# Patient Record
Sex: FEMALE | Race: WHITE | Employment: FULL TIME | URBAN - METROPOLITAN AREA
[De-identification: names, ages, dates, MRNs, and addresses within clinical notes are randomized per-mention and may not be internally consistent; named-entity substitution may affect disease eponyms.]

---

## 2017-01-05 ENCOUNTER — NURSE ONLY (OUTPATIENT)
Dept: FAMILY MEDICINE CLINIC | Age: 52
End: 2017-01-05

## 2017-01-05 DIAGNOSIS — J30.2 SEASONAL ALLERGIC RHINITIS, UNSPECIFIED ALLERGIC RHINITIS TRIGGER: Primary | ICD-10-CM

## 2017-01-05 PROCEDURE — 96372 THER/PROPH/DIAG INJ SC/IM: CPT | Performed by: FAMILY MEDICINE

## 2017-01-05 RX ORDER — TRIAMCINOLONE ACETONIDE 40 MG/ML
80 INJECTION, SUSPENSION INTRA-ARTICULAR; INTRAMUSCULAR ONCE
Status: COMPLETED | OUTPATIENT
Start: 2017-01-05 | End: 2017-01-05

## 2017-01-05 RX ADMIN — TRIAMCINOLONE ACETONIDE 80 MG: 40 INJECTION, SUSPENSION INTRA-ARTICULAR; INTRAMUSCULAR at 11:41

## 2017-01-10 DIAGNOSIS — I10 ESSENTIAL HYPERTENSION: ICD-10-CM

## 2017-01-10 DIAGNOSIS — M26.629 TMJ ARTHRALGIA: ICD-10-CM

## 2017-01-10 RX ORDER — CARVEDILOL 12.5 MG/1
TABLET ORAL
Qty: 60 TABLET | Refills: 1 | Status: SHIPPED | OUTPATIENT
Start: 2017-01-10 | End: 2017-03-25

## 2017-01-10 RX ORDER — IBUPROFEN 800 MG/1
TABLET ORAL
Qty: 60 TABLET | Refills: 1 | Status: SHIPPED | OUTPATIENT
Start: 2017-01-10 | End: 2017-04-25 | Stop reason: SDUPTHER

## 2017-01-10 RX ORDER — CARVEDILOL 6.25 MG/1
TABLET ORAL
Qty: 60 TABLET | Refills: 1 | Status: SHIPPED | OUTPATIENT
Start: 2017-01-10 | End: 2017-04-25 | Stop reason: SDUPTHER

## 2017-01-11 ENCOUNTER — HOSPITAL ENCOUNTER (OUTPATIENT)
Dept: GENERAL RADIOLOGY | Age: 52
Discharge: HOME | End: 2017-01-11
Payer: COMMERCIAL

## 2017-01-11 DIAGNOSIS — I10 ESSENTIAL HYPERTENSION: ICD-10-CM

## 2017-01-11 DIAGNOSIS — M47.817 LUMBOSACRAL SPONDYLOSIS WITHOUT MYELOPATHY: ICD-10-CM

## 2017-01-11 DIAGNOSIS — M26.629 TMJ ARTHRALGIA: ICD-10-CM

## 2017-01-11 PROCEDURE — 72110 X-RAY EXAM L-2 SPINE 4/>VWS: CPT

## 2017-01-11 RX ORDER — CARVEDILOL 12.5 MG/1
12.5 TABLET ORAL 2 TIMES DAILY
Qty: 60 TABLET | Refills: 5 | Status: SHIPPED | OUTPATIENT
Start: 2017-01-11 | End: 2017-04-25 | Stop reason: SDUPTHER

## 2017-01-11 RX ORDER — IBUPROFEN 800 MG/1
800 TABLET ORAL 2 TIMES DAILY WITH MEALS
Qty: 60 TABLET | Refills: 5 | Status: SHIPPED | OUTPATIENT
Start: 2017-01-11 | End: 2017-03-25

## 2017-01-11 RX ORDER — CARVEDILOL 6.25 MG/1
6.25 TABLET ORAL 2 TIMES DAILY WITH MEALS
Qty: 60 TABLET | Refills: 5 | Status: SHIPPED | OUTPATIENT
Start: 2017-01-11 | End: 2017-03-25

## 2017-01-11 RX ORDER — CYCLOBENZAPRINE HCL 10 MG
10 TABLET ORAL 3 TIMES DAILY PRN
Qty: 60 TABLET | Refills: 3 | Status: SHIPPED | OUTPATIENT
Start: 2017-01-11 | End: 2018-08-06 | Stop reason: SDUPTHER

## 2017-01-17 ENCOUNTER — HOSPITAL ENCOUNTER (OUTPATIENT)
Dept: PHYSICAL THERAPY | Age: 52
Setting detail: THERAPIES SERIES
Discharge: HOME | End: 2017-01-17
Payer: COMMERCIAL

## 2017-01-17 PROCEDURE — 97161 PT EVAL LOW COMPLEX 20 MIN: CPT

## 2017-01-24 ENCOUNTER — HOSPITAL ENCOUNTER (OUTPATIENT)
Dept: PHYSICAL THERAPY | Age: 52
Setting detail: THERAPIES SERIES
Discharge: HOME | End: 2017-01-24
Payer: COMMERCIAL

## 2017-01-24 PROCEDURE — 97140 MANUAL THERAPY 1/> REGIONS: CPT

## 2017-01-24 PROCEDURE — G0283 ELEC STIM OTHER THAN WOUND: HCPCS

## 2017-01-24 PROCEDURE — 97110 THERAPEUTIC EXERCISES: CPT

## 2017-01-25 ENCOUNTER — HOSPITAL ENCOUNTER (OUTPATIENT)
Dept: PHYSICAL THERAPY | Age: 52
Setting detail: THERAPIES SERIES
Discharge: HOME | End: 2017-01-25
Payer: COMMERCIAL

## 2017-01-25 PROCEDURE — 97140 MANUAL THERAPY 1/> REGIONS: CPT

## 2017-01-25 PROCEDURE — 97110 THERAPEUTIC EXERCISES: CPT

## 2017-01-25 PROCEDURE — G0283 ELEC STIM OTHER THAN WOUND: HCPCS

## 2017-01-31 ENCOUNTER — HOSPITAL ENCOUNTER (OUTPATIENT)
Dept: PHYSICAL THERAPY | Age: 52
Discharge: HOME | End: 2017-01-31

## 2017-02-09 ENCOUNTER — CLINICAL DOCUMENTATION (OUTPATIENT)
Dept: PHYSICAL THERAPY | Age: 52
End: 2017-02-09

## 2017-04-05 ENCOUNTER — HOSPITAL ENCOUNTER (OUTPATIENT)
Dept: MRI IMAGING | Age: 52
Discharge: HOME | End: 2017-04-05
Payer: COMMERCIAL

## 2017-04-05 DIAGNOSIS — M51.36 DEGENERATION OF LUMBAR INTERVERTEBRAL DISC: ICD-10-CM

## 2017-04-15 ENCOUNTER — HOSPITAL ENCOUNTER (OUTPATIENT)
Dept: MRI IMAGING | Age: 52
Discharge: HOME | End: 2017-04-15
Payer: COMMERCIAL

## 2017-04-15 PROCEDURE — 72148 MRI LUMBAR SPINE W/O DYE: CPT

## 2017-04-18 ENCOUNTER — OFFICE VISIT (OUTPATIENT)
Dept: BEHAVIORAL/MENTAL HEALTH | Age: 52
End: 2017-04-18

## 2017-04-18 DIAGNOSIS — F43.23 ADJUSTMENT DISORDER WITH MIXED ANXIETY AND DEPRESSED MOOD: Primary | ICD-10-CM

## 2017-04-18 PROCEDURE — 90791 PSYCH DIAGNOSTIC EVALUATION: CPT | Performed by: PSYCHOLOGIST

## 2017-04-18 ASSESSMENT — PATIENT HEALTH QUESTIONNAIRE - PHQ9
3. TROUBLE FALLING OR STAYING ASLEEP: 0
4. FEELING TIRED OR HAVING LITTLE ENERGY: 0
7. TROUBLE CONCENTRATING ON THINGS, SUCH AS READING THE NEWSPAPER OR WATCHING TELEVISION: 0
9. THOUGHTS THAT YOU WOULD BE BETTER OFF DEAD, OR OF HURTING YOURSELF: 0
8. MOVING OR SPEAKING SO SLOWLY THAT OTHER PEOPLE COULD HAVE NOTICED. OR THE OPPOSITE, BEING SO FIGETY OR RESTLESS THAT YOU HAVE BEEN MOVING AROUND A LOT MORE THAN USUAL: 0
10. IF YOU CHECKED OFF ANY PROBLEMS, HOW DIFFICULT HAVE THESE PROBLEMS MADE IT FOR YOU TO DO YOUR WORK, TAKE CARE OF THINGS AT HOME, OR GET ALONG WITH OTHER PEOPLE: 1
2. FEELING DOWN, DEPRESSED OR HOPELESS: 1
5. POOR APPETITE OR OVEREATING: 0
SUM OF ALL RESPONSES TO PHQ9 QUESTIONS 1 & 2: 2
1. LITTLE INTEREST OR PLEASURE IN DOING THINGS: 1
SUM OF ALL RESPONSES TO PHQ QUESTIONS 1-9: 2
6. FEELING BAD ABOUT YOURSELF - OR THAT YOU ARE A FAILURE OR HAVE LET YOURSELF OR YOUR FAMILY DOWN: 0

## 2017-04-25 ENCOUNTER — OFFICE VISIT (OUTPATIENT)
Dept: PRIMARY CARE CLINIC | Age: 52
End: 2017-04-25

## 2017-04-25 VITALS
WEIGHT: 270 LBS | DIASTOLIC BLOOD PRESSURE: 88 MMHG | SYSTOLIC BLOOD PRESSURE: 136 MMHG | HEART RATE: 88 BPM | HEIGHT: 67 IN | RESPIRATION RATE: 20 BRPM | BODY MASS INDEX: 42.38 KG/M2 | TEMPERATURE: 97.1 F

## 2017-04-25 DIAGNOSIS — M26.629 ARTHRALGIA OF TEMPOROMANDIBULAR JOINT, UNSPECIFIED LATERALITY: ICD-10-CM

## 2017-04-25 DIAGNOSIS — Z12.31 ENCOUNTER FOR SCREENING MAMMOGRAM FOR BREAST CANCER: ICD-10-CM

## 2017-04-25 DIAGNOSIS — I10 ESSENTIAL HYPERTENSION: Primary | ICD-10-CM

## 2017-04-25 PROCEDURE — 99214 OFFICE O/P EST MOD 30 MIN: CPT | Performed by: FAMILY MEDICINE

## 2017-04-25 RX ORDER — CARVEDILOL 6.25 MG/1
TABLET ORAL
Qty: 60 TABLET | Refills: 11 | Status: SHIPPED | OUTPATIENT
Start: 2017-04-25 | End: 2018-01-15

## 2017-04-25 RX ORDER — CARVEDILOL 12.5 MG/1
12.5 TABLET ORAL 2 TIMES DAILY
Qty: 60 TABLET | Refills: 11 | Status: SHIPPED | OUTPATIENT
Start: 2017-04-25 | End: 2018-05-04 | Stop reason: SDUPTHER

## 2017-04-25 RX ORDER — IBUPROFEN 800 MG/1
TABLET ORAL
Qty: 60 TABLET | Refills: 11 | Status: SHIPPED | OUTPATIENT
Start: 2017-04-25 | End: 2018-05-04 | Stop reason: SDUPTHER

## 2017-04-25 ASSESSMENT — ENCOUNTER SYMPTOMS
SHORTNESS OF BREATH: 0
ABDOMINAL PAIN: 0
BLURRED VISION: 0
ORTHOPNEA: 0
EYES NEGATIVE: 1
RESPIRATORY NEGATIVE: 1

## 2017-05-09 ENCOUNTER — HOSPITAL ENCOUNTER (OUTPATIENT)
Dept: WOMENS IMAGING | Age: 52
Discharge: HOME | End: 2017-05-09
Payer: COMMERCIAL

## 2017-05-09 DIAGNOSIS — Z12.31 ENCOUNTER FOR SCREENING MAMMOGRAM FOR BREAST CANCER: ICD-10-CM

## 2017-05-09 PROCEDURE — G0202 SCR MAMMO BI INCL CAD: HCPCS

## 2017-05-20 ENCOUNTER — TELEPHONE (OUTPATIENT)
Dept: PRIMARY CARE CLINIC | Age: 52
End: 2017-05-20

## 2017-06-06 ENCOUNTER — PATIENT MESSAGE (OUTPATIENT)
Dept: PRIMARY CARE CLINIC | Age: 52
End: 2017-06-06

## 2017-06-06 PROBLEM — M47.26 OSTEOARTHRITIS OF SPINE WITH RADICULOPATHY, LUMBAR REGION: Status: ACTIVE | Noted: 2017-06-06

## 2017-06-06 PROBLEM — M43.10 DEGENERATIVE SPONDYLOLISTHESIS: Status: ACTIVE | Noted: 2017-06-06

## 2017-06-17 DIAGNOSIS — R53.83 FATIGUE, UNSPECIFIED TYPE: ICD-10-CM

## 2017-06-17 LAB — VITAMIN D 25-HYDROXY: 13.1 NG/ML (ref 30–100)

## 2017-06-21 ENCOUNTER — PATIENT MESSAGE (OUTPATIENT)
Dept: PRIMARY CARE CLINIC | Age: 52
End: 2017-06-21

## 2017-06-21 RX ORDER — ERGOCALCIFEROL 1.25 MG/1
50000 CAPSULE ORAL WEEKLY
Qty: 12 CAPSULE | Refills: 0 | Status: SHIPPED | OUTPATIENT
Start: 2017-06-21 | End: 2017-06-27 | Stop reason: SDUPTHER

## 2017-06-23 ENCOUNTER — TELEPHONE (OUTPATIENT)
Dept: PRIMARY CARE CLINIC | Age: 52
End: 2017-06-23

## 2017-06-27 ENCOUNTER — OFFICE VISIT (OUTPATIENT)
Dept: PRIMARY CARE CLINIC | Age: 52
End: 2017-06-27

## 2017-06-27 VITALS
BODY MASS INDEX: 42.22 KG/M2 | SYSTOLIC BLOOD PRESSURE: 138 MMHG | DIASTOLIC BLOOD PRESSURE: 86 MMHG | HEIGHT: 67 IN | RESPIRATION RATE: 18 BRPM | OXYGEN SATURATION: 95 % | TEMPERATURE: 97.6 F | WEIGHT: 269 LBS | HEART RATE: 88 BPM

## 2017-06-27 DIAGNOSIS — M47.26 OSTEOARTHRITIS OF SPINE WITH RADICULOPATHY, LUMBAR REGION: ICD-10-CM

## 2017-06-27 DIAGNOSIS — E55.9 VITAMIN D DEFICIENCY: ICD-10-CM

## 2017-06-27 DIAGNOSIS — Z00.00 ANNUAL PHYSICAL EXAM: Primary | ICD-10-CM

## 2017-06-27 DIAGNOSIS — Z12.11 SCREENING FOR COLON CANCER: ICD-10-CM

## 2017-06-27 PROCEDURE — 99396 PREV VISIT EST AGE 40-64: CPT | Performed by: FAMILY MEDICINE

## 2017-06-27 RX ORDER — ERGOCALCIFEROL 1.25 MG/1
50000 CAPSULE ORAL
Qty: 24 CAPSULE | Refills: 0 | Status: SHIPPED | OUTPATIENT
Start: 2017-06-29 | End: 2017-10-24

## 2017-06-27 RX ORDER — GABAPENTIN 600 MG/1
600 TABLET ORAL EVERY EVENING
Qty: 90 TABLET | Refills: 0 | Status: SHIPPED | OUTPATIENT
Start: 2017-06-27 | End: 2018-05-04 | Stop reason: DRUGHIGH

## 2017-06-27 ASSESSMENT — ENCOUNTER SYMPTOMS
DIARRHEA: 0
BACK PAIN: 0
GASTROINTESTINAL NEGATIVE: 1
PHOTOPHOBIA: 0
CONSTIPATION: 0
SHORTNESS OF BREATH: 0
EYE ITCHING: 0
WHEEZING: 0
RESPIRATORY NEGATIVE: 1
ABDOMINAL PAIN: 0
EYE REDNESS: 0
EYES NEGATIVE: 1

## 2017-06-28 DIAGNOSIS — Z00.00 ANNUAL PHYSICAL EXAM: ICD-10-CM

## 2017-06-28 LAB
ALBUMIN SERPL-MCNC: 3.9 G/DL (ref 3.9–4.9)
ALP BLD-CCNC: 87 U/L (ref 40–130)
ALT SERPL-CCNC: 22 U/L (ref 0–33)
ANION GAP SERPL CALCULATED.3IONS-SCNC: 12 MEQ/L (ref 7–13)
AST SERPL-CCNC: 17 U/L (ref 0–35)
BASOPHILS ABSOLUTE: 0.1 K/UL (ref 0–0.2)
BASOPHILS RELATIVE PERCENT: 0.7 %
BILIRUB SERPL-MCNC: 0.4 MG/DL (ref 0–1.2)
BUN BLDV-MCNC: 15 MG/DL (ref 6–20)
CALCIUM SERPL-MCNC: 9.2 MG/DL (ref 8.6–10.2)
CHLORIDE BLD-SCNC: 105 MEQ/L (ref 98–107)
CHOLESTEROL, TOTAL: 191 MG/DL (ref 0–199)
CO2: 25 MEQ/L (ref 22–29)
CREAT SERPL-MCNC: 0.54 MG/DL (ref 0.5–0.9)
EOSINOPHILS ABSOLUTE: 0.3 K/UL (ref 0–0.7)
EOSINOPHILS RELATIVE PERCENT: 3.4 %
GFR AFRICAN AMERICAN: >60
GFR NON-AFRICAN AMERICAN: >60
GLOBULIN: 2.6 G/DL (ref 2.3–3.5)
GLUCOSE BLD-MCNC: 86 MG/DL (ref 74–109)
HCT VFR BLD CALC: 43.5 % (ref 37–47)
HDLC SERPL-MCNC: 36 MG/DL (ref 40–59)
HEMOGLOBIN: 14.3 G/DL (ref 12–16)
LDL CHOLESTEROL CALCULATED: 128 MG/DL (ref 0–129)
LYMPHOCYTES ABSOLUTE: 1.6 K/UL (ref 1–4.8)
LYMPHOCYTES RELATIVE PERCENT: 20.4 %
MCH RBC QN AUTO: 29 PG (ref 27–31.3)
MCHC RBC AUTO-ENTMCNC: 32.8 % (ref 33–37)
MCV RBC AUTO: 88.3 FL (ref 82–100)
MONOCYTES ABSOLUTE: 0.7 K/UL (ref 0.2–0.8)
MONOCYTES RELATIVE PERCENT: 8.2 %
NEUTROPHILS ABSOLUTE: 5.4 K/UL (ref 1.4–6.5)
NEUTROPHILS RELATIVE PERCENT: 67.3 %
PDW BLD-RTO: 13.5 % (ref 11.5–14.5)
PLATELET # BLD: 198 K/UL (ref 130–400)
POTASSIUM SERPL-SCNC: 4.2 MEQ/L (ref 3.5–5.1)
RBC # BLD: 4.93 M/UL (ref 4.2–5.4)
SODIUM BLD-SCNC: 142 MEQ/L (ref 132–144)
TOTAL PROTEIN: 6.5 G/DL (ref 6.4–8.1)
TRIGL SERPL-MCNC: 134 MG/DL (ref 0–200)
TSH SERPL DL<=0.05 MIU/L-ACNC: 3.01 UIU/ML (ref 0.27–4.2)
WBC # BLD: 8 K/UL (ref 4.8–10.8)

## 2017-06-29 ENCOUNTER — TELEPHONE (OUTPATIENT)
Dept: PRIMARY CARE CLINIC | Age: 52
End: 2017-06-29

## 2017-06-30 DIAGNOSIS — Z12.11 SCREENING FOR COLON CANCER: Primary | ICD-10-CM

## 2017-07-11 ENCOUNTER — HOSPITAL ENCOUNTER (OUTPATIENT)
Dept: WOMENS IMAGING | Age: 52
Discharge: HOME | End: 2017-07-11
Payer: COMMERCIAL

## 2017-07-11 DIAGNOSIS — Z78.0 ASYMPTOMATIC POSTMENOPAUSAL STATUS (AGE-RELATED) (NATURAL): ICD-10-CM

## 2017-07-11 PROCEDURE — 77080 DXA BONE DENSITY AXIAL: CPT

## 2017-07-21 ENCOUNTER — NURSE ONLY (OUTPATIENT)
Dept: FAMILY MEDICINE CLINIC | Age: 52
End: 2017-07-21

## 2017-07-21 DIAGNOSIS — J30.2 SEASONAL ALLERGIC RHINITIS, UNSPECIFIED ALLERGIC RHINITIS TRIGGER: Primary | ICD-10-CM

## 2017-07-21 PROCEDURE — 96372 THER/PROPH/DIAG INJ SC/IM: CPT | Performed by: FAMILY MEDICINE

## 2017-07-21 RX ORDER — TRIAMCINOLONE ACETONIDE 40 MG/ML
80 INJECTION, SUSPENSION INTRA-ARTICULAR; INTRAMUSCULAR ONCE
Status: COMPLETED | OUTPATIENT
Start: 2017-07-21 | End: 2017-07-21

## 2017-07-21 RX ADMIN — TRIAMCINOLONE ACETONIDE 80 MG: 40 INJECTION, SUSPENSION INTRA-ARTICULAR; INTRAMUSCULAR at 12:00

## 2017-07-26 ENCOUNTER — TELEPHONE (OUTPATIENT)
Dept: PRIMARY CARE CLINIC | Age: 52
End: 2017-07-26

## 2017-09-06 RX ORDER — ERGOCALCIFEROL 1.25 MG/1
CAPSULE ORAL
Qty: 24 CAPSULE | Refills: 1 | Status: SHIPPED | OUTPATIENT
Start: 2017-09-06 | End: 2018-01-15 | Stop reason: SDUPTHER

## 2017-09-08 ENCOUNTER — PATIENT MESSAGE (OUTPATIENT)
Dept: PRIMARY CARE CLINIC | Age: 52
End: 2017-09-08

## 2017-09-27 ENCOUNTER — TELEPHONE (OUTPATIENT)
Dept: FAMILY MEDICINE CLINIC | Age: 52
End: 2017-09-27

## 2017-09-27 RX ORDER — AZITHROMYCIN 250 MG/1
TABLET, FILM COATED ORAL
Qty: 6 TABLET | Refills: 0 | Status: SHIPPED | OUTPATIENT
Start: 2017-09-27 | End: 2017-10-24

## 2017-10-04 ENCOUNTER — TELEPHONE (OUTPATIENT)
Dept: FAMILY MEDICINE CLINIC | Age: 52
End: 2017-10-04

## 2017-10-04 RX ORDER — CEFUROXIME AXETIL 250 MG/1
250 TABLET ORAL 2 TIMES DAILY
Qty: 20 TABLET | Refills: 0 | Status: SHIPPED | OUTPATIENT
Start: 2017-10-04 | End: 2017-10-14

## 2017-10-15 ENCOUNTER — HOSPITAL ENCOUNTER (EMERGENCY)
Age: 52
Discharge: HOME | End: 2017-10-15
Payer: COMMERCIAL

## 2017-10-15 ENCOUNTER — APPOINTMENT (OUTPATIENT)
Dept: GENERAL RADIOLOGY | Age: 52
End: 2017-10-15
Payer: COMMERCIAL

## 2017-10-15 VITALS
WEIGHT: 260 LBS | BODY MASS INDEX: 40.81 KG/M2 | DIASTOLIC BLOOD PRESSURE: 101 MMHG | RESPIRATION RATE: 18 BRPM | HEART RATE: 76 BPM | OXYGEN SATURATION: 99 % | HEIGHT: 67 IN | SYSTOLIC BLOOD PRESSURE: 172 MMHG | TEMPERATURE: 98 F
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
## 2017-10-15 DIAGNOSIS — M25.561 ACUTE PAIN OF RIGHT KNEE: ICD-10-CM

## 2017-10-15 DIAGNOSIS — M79.601 RIGHT ARM PAIN: ICD-10-CM

## 2017-10-15 DIAGNOSIS — M25.562 ACUTE PAIN OF LEFT KNEE: ICD-10-CM

## 2017-10-15 PROCEDURE — 73562 X-RAY EXAM OF KNEE 3: CPT

## 2017-10-15 PROCEDURE — 99283 EMERGENCY DEPT VISIT LOW MDM: CPT

## 2017-10-15 PROCEDURE — 73060 X-RAY EXAM OF HUMERUS: CPT

## 2017-10-15 RX ORDER — TRAMADOL HYDROCHLORIDE 50 MG/1
50 TABLET ORAL EVERY 6 HOURS PRN
Qty: 10 TABLET | Refills: 0 | Status: SHIPPED | OUTPATIENT
Start: 2017-10-15 | End: 2017-10-25

## 2017-10-15 ASSESSMENT — PAIN DESCRIPTION - ORIENTATION: ORIENTATION: RIGHT

## 2017-10-15 ASSESSMENT — PAIN DESCRIPTION - FREQUENCY: FREQUENCY: CONTINUOUS

## 2017-10-15 ASSESSMENT — ENCOUNTER SYMPTOMS
SHORTNESS OF BREATH: 0
VOMITING: 0
NAUSEA: 0
DIARRHEA: 0
COUGH: 0
ABDOMINAL PAIN: 0

## 2017-10-15 ASSESSMENT — PAIN DESCRIPTION - DESCRIPTORS: DESCRIPTORS: ACHING;SHARP;SHOOTING

## 2017-10-15 ASSESSMENT — PAIN DESCRIPTION - LOCATION: LOCATION: ARM;KNEE

## 2017-10-15 ASSESSMENT — PAIN DESCRIPTION - PAIN TYPE: TYPE: ACUTE PAIN;CHRONIC PAIN

## 2017-10-15 ASSESSMENT — PAIN SCALES - GENERAL: PAINLEVEL_OUTOF10: 8

## 2017-10-15 NOTE — ED PROVIDER NOTES
[address] [facility] ED  eMERGENCY dEPARTMENT eNCOUnter      Pt Name: Tres Steele  MRN: [id number]  [place] 1965  Date of evaluation: 10/15/2017  Provider: Moshe Bob PA-C      HISTORY OF PRESENT ILLNESS    Tres Steele is a 47 y.o. female who presents to the Emergency Department with 2 days ago. Patient tripped going into a school on the metal portion of a doorway. Patient landed on bilateral knees. Patient's right knee has more pain in the left knee. She is able to ambulate but it causes pain. She does have a history of chronic knee problems that she sees Dr. Vero Allen 4. Patient also complains of right arm pain. With bruising. She is not on any blood thinners. She denies any numbness or tingling. Patient has been taking Tylenol and Motrin. Walking makes the pain worse. REVIEW OF SYSTEMS       Review of Systems   Constitutional: Negative for chills, diaphoresis, fatigue and fever. HENT: Negative for congestion. Respiratory: Negative for cough and shortness of breath. Cardiovascular: Negative for chest pain and palpitations. Gastrointestinal: Negative for abdominal pain, diarrhea, nausea and vomiting. Genitourinary: Negative for dysuria and flank pain. Musculoskeletal: Positive for arthralgias. Skin: Negative for rash. Neurological: Negative for dizziness, light-headedness and headaches.          PAST MEDICAL HISTORY     Past Medical History:   Diagnosis Date    Allergic rhinitis 3/8/2016    Chronic bilateral low back pain without sciatica 12/19/2016    Diverticulosis 8/27/2013    Drug addiction, Pts. daughter 12/19/2016    Hypertension     Localized edema 8/30/2016    Osteoarthritis     Osteoarthritis of spine with radiculopathy, lumbar region, Sertich 6/27/2017    Postprandial diarrhea 8/27/2013    Renal stones     S/P cholecystectomy, 2008 8/27/2013    Screen for colon cancer 8/30/2016    Seasonal allergies 8/11/2015    Stress 8/27/2013 No    Sexual activity: Not Asked     Other Topics Concern    None     Social History Narrative    None       SCREENINGS             PHYSICAL EXAM    (up to 7 for level 4, 8 or more for level 5)     ED Triage Vitals [10/15/17 1133]   BP Temp Temp Source Pulse Resp SpO2 Height Weight   (!) 172/101 98 °F (36.7 °C) Oral 76 18 99 % 5' 7\" (1.702 m) 260 lb (117.9 kg)       Physical Exam   Constitutional: She is oriented to person, place, and time. She appears well-developed and well-nourished. She is active. No distress. HENT:   Head: Normocephalic and atraumatic. Mouth/Throat: Mucous membranes are normal.   Neck: Normal range of motion. Neck supple. Cardiovascular: Normal rate, regular rhythm, normal heart sounds, intact distal pulses and normal pulses. Pulmonary/Chest: Effort normal and breath sounds normal.   Abdominal: Soft. Normal appearance and bowel sounds are normal. There is no tenderness. Musculoskeletal:        Right knee: She exhibits decreased range of motion. She exhibits no swelling. Tenderness found. Medial joint line, lateral joint line, MCL, LCL and patellar tendon tenderness noted. Right upper arm: She exhibits tenderness. Arms:  Bruising and tenderness to palpation over right humerus. Full range of motion but it elicits pain. Right knee tenderness to palpation. Range of motion limited due to pain. Left knee minimal tenderness to palpation. Full range of motion. Sensation intact to light touch. 2+ distal pulse. Neurological: She is alert and oriented to person, place, and time. She has normal strength. Skin: Skin is warm, dry and intact. No rash noted. She is not diaphoretic. Nursing note and vitals reviewed. All other labs were within normal range or not returned as of this dictation.     EMERGENCY DEPARTMENT COURSE and DIFFERENTIAL DIAGNOSIS/MDM:   Vitals:    Vitals:    10/15/17 1133   BP: (!) 172/101   Pulse: 76   Resp: 18   Temp: 98 °F (36.7 °C)

## 2017-10-19 ENCOUNTER — CARE COORDINATION (OUTPATIENT)
Dept: CARE COORDINATION | Age: 52
End: 2017-10-19

## 2017-10-21 ENCOUNTER — HOSPITAL ENCOUNTER (EMERGENCY)
Age: 52
Discharge: HOME | End: 2017-10-21
Attending: EMERGENCY MEDICINE
Payer: COMMERCIAL

## 2017-10-21 ENCOUNTER — APPOINTMENT (OUTPATIENT)
Dept: CT IMAGING | Age: 52
End: 2017-10-21
Payer: COMMERCIAL

## 2017-10-21 VITALS
BODY MASS INDEX: 40.81 KG/M2 | DIASTOLIC BLOOD PRESSURE: 92 MMHG | SYSTOLIC BLOOD PRESSURE: 172 MMHG | OXYGEN SATURATION: 96 % | TEMPERATURE: 98.4 F | HEART RATE: 82 BPM | RESPIRATION RATE: 18 BRPM | HEIGHT: 67 IN | WEIGHT: 260 LBS

## 2017-10-21 DIAGNOSIS — G50.0 TRIGEMINAL NEURALGIA: Primary | ICD-10-CM

## 2017-10-21 LAB
ALBUMIN SERPL-MCNC: 3.6 G/DL (ref 3.9–4.9)
ALP BLD-CCNC: 95 U/L (ref 40–130)
ALT SERPL-CCNC: 14 U/L (ref 0–33)
ANION GAP SERPL CALCULATED.3IONS-SCNC: 10 MEQ/L (ref 7–13)
AST SERPL-CCNC: 10 U/L (ref 0–35)
BASOPHILS ABSOLUTE: 0.1 K/UL (ref 0–0.2)
BASOPHILS RELATIVE PERCENT: 0.7 %
BILIRUB SERPL-MCNC: 0.2 MG/DL (ref 0–1.2)
BUN BLDV-MCNC: 28 MG/DL (ref 6–20)
CALCIUM SERPL-MCNC: 9 MG/DL (ref 8.6–10.2)
CHLORIDE BLD-SCNC: 104 MEQ/L (ref 98–107)
CO2: 27 MEQ/L (ref 22–29)
CREAT SERPL-MCNC: 0.56 MG/DL (ref 0.5–0.9)
EOSINOPHILS ABSOLUTE: 0.1 K/UL (ref 0–0.7)
EOSINOPHILS RELATIVE PERCENT: 1.1 %
GFR AFRICAN AMERICAN: >60
GFR NON-AFRICAN AMERICAN: >60
GLOBULIN: 2.5 G/DL (ref 2.3–3.5)
GLUCOSE BLD-MCNC: 119 MG/DL (ref 74–109)
HCT VFR BLD CALC: 40.7 % (ref 37–47)
HEMOGLOBIN: 13.4 G/DL (ref 12–16)
LYMPHOCYTES ABSOLUTE: 1.9 K/UL (ref 1–4.8)
LYMPHOCYTES RELATIVE PERCENT: 17.1 %
MCH RBC QN AUTO: 29.1 PG (ref 27–31.3)
MCHC RBC AUTO-ENTMCNC: 32.9 % (ref 33–37)
MCV RBC AUTO: 88.3 FL (ref 82–100)
MONOCYTES ABSOLUTE: 0.8 K/UL (ref 0.2–0.8)
MONOCYTES RELATIVE PERCENT: 7.5 %
NEUTROPHILS ABSOLUTE: 8.3 K/UL (ref 1.4–6.5)
NEUTROPHILS RELATIVE PERCENT: 73.6 %
PDW BLD-RTO: 13.5 % (ref 11.5–14.5)
PLATELET # BLD: 205 K/UL (ref 130–400)
POTASSIUM SERPL-SCNC: 4.2 MEQ/L (ref 3.5–5.1)
RBC # BLD: 4.61 M/UL (ref 4.2–5.4)
SEDIMENTATION RATE, ERYTHROCYTE: 16 MM (ref 0–30)
SODIUM BLD-SCNC: 141 MEQ/L (ref 132–144)
TOTAL PROTEIN: 6.1 G/DL (ref 6.4–8.1)
WBC # BLD: 11.3 K/UL (ref 4.8–10.8)

## 2017-10-21 PROCEDURE — 96375 TX/PRO/DX INJ NEW DRUG ADDON: CPT

## 2017-10-21 PROCEDURE — 6370000000 HC RX 637 (ALT 250 FOR IP): Performed by: EMERGENCY MEDICINE

## 2017-10-21 PROCEDURE — 99284 EMERGENCY DEPT VISIT MOD MDM: CPT

## 2017-10-21 PROCEDURE — 80053 COMPREHEN METABOLIC PANEL: CPT

## 2017-10-21 PROCEDURE — 6360000002 HC RX W HCPCS: Performed by: EMERGENCY MEDICINE

## 2017-10-21 PROCEDURE — 70450 CT HEAD/BRAIN W/O DYE: CPT

## 2017-10-21 PROCEDURE — 85025 COMPLETE CBC W/AUTO DIFF WBC: CPT

## 2017-10-21 PROCEDURE — 96374 THER/PROPH/DIAG INJ IV PUSH: CPT

## 2017-10-21 PROCEDURE — 85652 RBC SED RATE AUTOMATED: CPT

## 2017-10-21 PROCEDURE — 36415 COLL VENOUS BLD VENIPUNCTURE: CPT

## 2017-10-21 RX ORDER — DIAZEPAM 5 MG/ML
5 INJECTION, SOLUTION INTRAMUSCULAR; INTRAVENOUS ONCE
Status: DISCONTINUED | OUTPATIENT
Start: 2017-10-21 | End: 2017-10-21

## 2017-10-21 RX ORDER — CARBAMAZEPINE 200 MG/1
200 TABLET ORAL 2 TIMES DAILY
Status: DISCONTINUED | OUTPATIENT
Start: 2017-10-21 | End: 2017-10-22 | Stop reason: HOSPADM

## 2017-10-21 RX ORDER — CARBAMAZEPINE 200 MG/1
200 CAPSULE, EXTENDED RELEASE ORAL 2 TIMES DAILY
Qty: 30 CAPSULE | Refills: 0 | Status: SHIPPED | OUTPATIENT
Start: 2017-10-21 | End: 2018-02-15 | Stop reason: ALTCHOICE

## 2017-10-21 RX ORDER — KETOROLAC TROMETHAMINE 30 MG/ML
30 INJECTION, SOLUTION INTRAMUSCULAR; INTRAVENOUS ONCE
Status: COMPLETED | OUTPATIENT
Start: 2017-10-21 | End: 2017-10-21

## 2017-10-21 RX ORDER — LORAZEPAM 2 MG/ML
1 INJECTION INTRAMUSCULAR ONCE
Status: COMPLETED | OUTPATIENT
Start: 2017-10-21 | End: 2017-10-21

## 2017-10-21 RX ADMIN — CARBAMAZEPINE 200 MG: 200 TABLET ORAL at 23:40

## 2017-10-21 RX ADMIN — KETOROLAC TROMETHAMINE 30 MG: 30 INJECTION, SOLUTION INTRAMUSCULAR at 21:49

## 2017-10-21 RX ADMIN — LORAZEPAM 1 MG: 2 INJECTION INTRAMUSCULAR; INTRAVENOUS at 21:49

## 2017-10-21 ASSESSMENT — ENCOUNTER SYMPTOMS
SHORTNESS OF BREATH: 0
ABDOMINAL PAIN: 0
FACIAL SWELLING: 0
CHEST TIGHTNESS: 0
SORE THROAT: 0
PHOTOPHOBIA: 0
VOMITING: 0
EYE DISCHARGE: 0
ABDOMINAL DISTENTION: 0
WHEEZING: 0
COUGH: 0

## 2017-10-21 ASSESSMENT — PAIN DESCRIPTION - PAIN TYPE: TYPE: ACUTE PAIN

## 2017-10-21 ASSESSMENT — PAIN DESCRIPTION - LOCATION: LOCATION: HEAD

## 2017-10-21 ASSESSMENT — PAIN DESCRIPTION - DESCRIPTORS: DESCRIPTORS: ACHING;PRESSURE

## 2017-10-21 ASSESSMENT — PAIN SCALES - GENERAL: PAINLEVEL_OUTOF10: 10

## 2017-10-22 NOTE — ED PROVIDER NOTES
[address] [facility] ED  eMERGENCY dEPARTMENT eNCOUnter      Pt Name: Cheyenne Harris  MRN: [id number]  [place] 1965  Date of evaluation: 10/21/2017  Provider: Bentley Galan MD    [address] [place] [address]       Chief Complaint   Patient presents with    Numbness     facial onset 1800    Headache         HISTORY OF PRESENT ILLNESS   (Location/Symptom, Timing/Onset, Context/Setting, Quality, Duration, Modifying Factors, Severity)  Note limiting factors. Cheyenne Harris is a 47 y.o. female who presents to the emergency department Complaining of right-sided facial pain. Patient states she is getting these lancing pains to the right side of her face at this last a few seconds. Since going on for the past 2-3 hours. She describes maybe a slight numbness with it. No weakness. No weakness of the face mouth or tongue. No weakness of the arms or legs. She states causing a headache relation means more facial pain. She's not had this before on the right side. She had questionable TMJ and the left side in the past.  No recent upper respiratory symptoms. HPI    Nursing Notes were reviewed. REVIEW OF SYSTEMS    (2-9 systems for level 4, 10 or more for level 5)     Review of Systems   Constitutional: Negative for chills and diaphoresis. HENT: Negative for congestion, ear discharge, ear pain, facial swelling, mouth sores and sore throat. Eyes: Negative for photophobia and discharge. Respiratory: Negative for cough, chest tightness, shortness of breath and wheezing. Cardiovascular: Negative for chest pain and palpitations. Gastrointestinal: Negative for abdominal distention, abdominal pain and vomiting. Endocrine: Negative for cold intolerance. Genitourinary: Negative for difficulty urinating. Musculoskeletal: Negative for arthralgias. Skin: Negative for pallor and rash. Allergic/Immunologic: Negative for immunocompromised state. Neurological: Negative for dizziness and syncope. Hematological: Negative for adenopathy. Psychiatric/Behavioral: Negative for agitation and hallucinations. Except as noted above the remainder of the review of systems was reviewed and negative. PAST MEDICAL HISTORY     Past Medical History:   Diagnosis Date    Allergic rhinitis 3/8/2016    Chronic bilateral low back pain without sciatica 12/19/2016    Diverticulosis 8/27/2013    Drug addiction, Pts. daughter 12/19/2016    Hypertension     Localized edema 8/30/2016    Osteoarthritis     Osteoarthritis of spine with radiculopathy, lumbar region, Sertich 6/27/2017    Postprandial diarrhea 8/27/2013    Renal stones     S/P cholecystectomy, 2008 8/27/2013    Screen for colon cancer 8/30/2016    Seasonal allergies 8/11/2015    Stress 8/27/2013         SURGICAL HISTORY       Past Surgical History:   Procedure Laterality Date   [facility]    CHOLECYSTECTOMY      2009    FOOT SURGERY      Left foot 1999    HERNIA REPAIR      2009    HYSTERECTOMY  2006         CURRENT MEDICATIONS       Previous Medications    AZITHROMYCIN (ZITHROMAX Z-PAOLA) 250 MG TABLET    2 STAT THEN 1 DAILY    CARVEDILOL (COREG) 12.5 MG TABLET    Take 1 tablet by mouth 2 times daily    CARVEDILOL (COREG) 6.25 MG TABLET    TAKE 1 TABLET TWICE DAILY (with a meal)    CYCLOBENZAPRINE (FLEXERIL) 10 MG TABLET    Take 1 tablet by mouth 3 times daily as needed for Muscle spasms    GABAPENTIN (NEURONTIN) 100 MG CAPSULE    Take 1 capsule by mouth every morning (before breakfast)    GABAPENTIN (NEURONTIN) 300 MG CAPSULE    Take 1 capsule by mouth 2 times daily    GABAPENTIN (NEURONTIN) 600 MG TABLET    Take 1 tablet by mouth every evening    IBUPROFEN (ADVIL;MOTRIN) 800 MG TABLET    Take 1 tablet by mouth 2 times daily (with meals)    RANITIDINE (ZANTAC) 150 MG TABLET    Take 150 mg by mouth 2 times daily.     TRAMADOL (ULTRAM) 50 MG TABLET    Take 1 tablet by mouth every 6 hours as needed for Pain    VITAMIN D (ERGOCALCIFEROL) 93333 UNITS CAPS CAPSULE    Take 1 capsule by mouth Twice a Week    VITAMIN D (ERGOCALCIFEROL) 49253 UNITS CAPS CAPSULE    1 capsule twice weekly       ALLERGIES     Morphine; Celebrex [celecoxib]; and Medrol [methylprednisolone]    FAMILY HISTORY       Family History   Problem Relation Age of Onset    High Blood Pressure Mother     Other Mother      Hypothyrodism    Cancer Father      prostate    Arthritis Father     Arthritis Brother     Diabetes Maternal Grandmother     Depression Paternal Grandfather     Diabetes Paternal Grandmother           SOCIAL HISTORY       Social History     Social History    Marital status:      Spouse name: N/A    Number of children: N/A    Years of education: N/A     Social History Main Topics    Smoking status: Never Smoker    Smokeless tobacco: Never Used    Alcohol use No    Drug use: No    Sexual activity: Not Asked     Other Topics Concern    None     Social History Narrative    None       SCREENINGS             PHYSICAL EXAM    (up to 7 for level 4, 8 or more for level 5)     ED Triage Vitals [10/21/17 2025]   BP Temp Temp Source Pulse Resp SpO2 Height Weight   (!) 172/92 98.4 °F (36.9 °C) Oral 82 18 96 % 5' 7\" (1.702 m) 260 lb (117.9 kg)       Physical Exam   Constitutional: She is oriented to person, place, and time. She appears well-developed. HENT:   Head: Normocephalic. Not macrocephalic and not microcephalic. Right Ear: Hearing and tympanic membrane normal.   Left Ear: Hearing and tympanic membrane normal.   Nose: Nose normal.   Mouth/Throat: Uvula is midline, oropharynx is clear and moist and mucous membranes are normal.   Eyes: Conjunctivae are normal. Pupils are equal, round, and reactive to light. Neck: Normal range of motion. Neck supple. Cardiovascular: Normal rate, regular rhythm, normal heart sounds and intact distal pulses. Pulmonary/Chest: Effort normal and breath sounds normal.   Abdominal: Soft.  Bowel sounds are normal. There is no tenderness. There is no guarding. Musculoskeletal: Normal range of motion. Neurological: She is alert and oriented to person, place, and time. Skin: Skin is warm and dry. Psychiatric: She has a normal mood and affect. Nursing note and vitals reviewed. DIAGNOSTIC RESULTS     EKG: All EKG's are interpreted by the Emergency Department Physician who either signs or Co-signs this chart in the absence of a cardiologist.        RADIOLOGY:   Non-plain film images such as CT, Ultrasound and MRI are read by the radiologist. Plain radiographic images are visualized and preliminarily interpreted by the emergency physician with the below findings:    CT scan of the head is unremarkable. Interpretation per the Radiologist below, if available at the time of this note:    No orders to display         ED BEDSIDE ULTRASOUND:   Performed by ED Physician - none    LABS:  Labs Reviewed - No data to display    All other labs were within normal range or not returned as of this dictation. EMERGENCY DEPARTMENT COURSE and DIFFERENTIAL DIAGNOSIS/MDM:   Vitals:    Vitals:    10/21/17 2025   BP: (!) 172/92   Pulse: 82   Resp: 18   Temp: 98.4 °F (36.9 °C)   TempSrc: Oral   SpO2: 96%   Weight: 260 lb (117.9 kg)   Height: 5' 7\" (1.702 m)         ED Course      MDM patient is pain free and recheck. She has not had any surges of pain for over an hour. Symptoms are suggestive of trigeminal neuralgia. Unlikely to represent early zoster. No signs of temporal arteritis or stroke. She is given follow-up with the ENT return here for fever or worsening symptoms        CONSULTS:  None    PROCEDURES:  Unless otherwise noted below, none     Procedures    FINAL IMPRESSION    No diagnosis found. DISPOSITION/PLAN   DISPOSITION     PATIENT REFERRED TO:  No follow-up provider specified.     DISCHARGE MEDICATIONS:  New Prescriptions    No medications on file          (Please note that portions of this note were completed with a voice recognition program.  Efforts were made to edit the dictations but occasionally words are mis-transcribed.)    Phil Oleary MD (electronically signed)  Attending Emergency Physician          Phil Oleary MD  10/21/17 0742

## 2017-10-23 ENCOUNTER — CARE COORDINATION (OUTPATIENT)
Dept: CARE COORDINATION | Age: 52
End: 2017-10-23

## 2017-10-24 ENCOUNTER — OFFICE VISIT (OUTPATIENT)
Dept: PRIMARY CARE CLINIC | Age: 52
End: 2017-10-24

## 2017-10-24 VITALS
SYSTOLIC BLOOD PRESSURE: 136 MMHG | DIASTOLIC BLOOD PRESSURE: 88 MMHG | RESPIRATION RATE: 16 BRPM | HEIGHT: 67 IN | WEIGHT: 256 LBS | BODY MASS INDEX: 40.18 KG/M2 | HEART RATE: 80 BPM | TEMPERATURE: 97.7 F

## 2017-10-24 DIAGNOSIS — M25.562 CHRONIC PAIN OF BOTH KNEES: ICD-10-CM

## 2017-10-24 DIAGNOSIS — Z12.11 SCREENING FOR COLON CANCER: ICD-10-CM

## 2017-10-24 DIAGNOSIS — M25.561 CHRONIC PAIN OF BOTH KNEES: ICD-10-CM

## 2017-10-24 DIAGNOSIS — G89.29 CHRONIC PAIN OF BOTH KNEES: ICD-10-CM

## 2017-10-24 DIAGNOSIS — G50.0 TRIGEMINAL NEURALGIA OF RIGHT SIDE OF FACE: Primary | ICD-10-CM

## 2017-10-24 DIAGNOSIS — M47.26 OSTEOARTHRITIS OF SPINE WITH RADICULOPATHY, LUMBAR REGION: ICD-10-CM

## 2017-10-24 DIAGNOSIS — Z12.4 CERVICAL CANCER SCREENING: ICD-10-CM

## 2017-10-24 PROCEDURE — 99213 OFFICE O/P EST LOW 20 MIN: CPT | Performed by: FAMILY MEDICINE

## 2017-10-24 RX ORDER — GABAPENTIN 100 MG/1
200 CAPSULE ORAL 2 TIMES DAILY
Qty: 120 CAPSULE | Refills: 2 | Status: SHIPPED | OUTPATIENT
Start: 2017-10-24 | End: 2018-02-02 | Stop reason: SDUPTHER

## 2017-10-24 ASSESSMENT — ENCOUNTER SYMPTOMS
APNEA: 0
FACIAL SWELLING: 0
EYE PAIN: 0
COLOR CHANGE: 0
WHEEZING: 0
EYE REDNESS: 0
EYE DISCHARGE: 0
CONSTIPATION: 0
CHEST TIGHTNESS: 0
NAUSEA: 0
DIARRHEA: 0
PHOTOPHOBIA: 0
ABDOMINAL PAIN: 0
STRIDOR: 0
BACK PAIN: 1
CHOKING: 0

## 2017-10-24 NOTE — PROGRESS NOTES
Subjective:      Patient ID: Hamzah Jordan is a 47 y.o. female who presents today for:  Chief Complaint   Patient presents with    Follow-Up from Hospital     Has been to [facility] ER twice within the last 2 weeks. She went 10/15/17 because she fell & landed on both knees. She has bilateral knee bruising & bruising to her right upper arm. She has seen Dr. Claudene Rio since & had her right knee drained. She went to the ER on 10/21/17 because she was having right sided jaw pain while driving home from a [employer] event. Was diagnosed with Trigeminal Neuralgia.  Discuss Medications     Was given Carbamazepine 200 MG for the Trigeminal Neuralgia. She wants to talk about this medication & if she should take it since she already takes Neurontin? HPI   Follow-Up from Hospital  Patient is here today for f/u on [facility] ER visits on 10/15 and 10/21. She was seen initially due to a fall and landing on bilateral knees. She has followed up with Dr. Claudene Rio since and had her right knee drained. She was then seen due to right facial numbness. She was diagnosed with Trigeminal Neuralgia. She was prescribed Carbamazepine 200mg at that visit but would like to know if she should continue this due to her already taking Neurontin?      Past Medical History:   Diagnosis Date    Allergic rhinitis 3/8/2016    Chronic bilateral low back pain without sciatica 12/19/2016    Diverticulosis 8/27/2013    Drug addiction, Pts. daughter 12/19/2016    Hypertension     Localized edema 8/30/2016    Osteoarthritis     Osteoarthritis of spine with radiculopathy, lumbar region, Sertich 6/27/2017    Postprandial diarrhea 8/27/2013    Renal stones     S/P cholecystectomy, 2008 8/27/2013    Screen for colon cancer 8/30/2016    Seasonal allergies 8/11/2015    Stress 8/27/2013     Past Surgical History:   Procedure Laterality Date   49 [facility]    CHOLECYSTECTOMY      2009    FOOT SURGERY      Left foot 1999    HERNIA REPAIR headaches. Psychiatric/Behavioral: Negative for confusion and hallucinations. The patient is not nervous/anxious and is not hyperactive. Objective:   /88 (Site: Left Arm, Position: Sitting, Cuff Size: Large Adult)   Pulse 80   Temp 97.7 °F (36.5 °C) (Oral)   Resp 16   Ht 5' 7\" (1.702 m)   Wt 256 lb (116.1 kg)   BMI 40.10 kg/m²     Physical Exam   Constitutional: She is oriented to person, place, and time. She appears well-developed and well-nourished. HENT:   Head: Normocephalic and atraumatic. Nose: Nose normal.   Eyes: Conjunctivae and EOM are normal. Pupils are equal, round, and reactive to light. No scleral icterus. Neck: Normal range of motion. Neck supple. Cardiovascular: Normal rate, regular rhythm and normal heart sounds. Exam reveals no gallop and no friction rub. No murmur heard. Pulmonary/Chest: Effort normal and breath sounds normal. No respiratory distress. She has no wheezes. She has no rales. She exhibits no tenderness. Neurological: She is alert and oriented to person, place, and time. Skin: Skin is warm and dry. No rash noted. No erythema. No pallor. Psychiatric: She has a normal mood and affect. Her behavior is normal. Judgment normal.   Nursing note and vitals reviewed. Assessment:     1. Trigeminal neuralgia of right side of face     2. Chronic pain of both knees, need replacements per Dr. Liudmila Antunez     3. Osteoarthritis of spine with radiculopathy, lumbar region, Sertich     4. Cervical cancer screening  Ambulatory referral to Obstetrics / Gynecology   5.  Screening for colon cancer  POCT Fecal Immunochemical Test (Fit)       Plan:      Orders Placed This Encounter   Procedures    Ambulatory referral to Obstetrics / Gynecology     Referral Priority:   Routine     Referral Type:   Consult for Advice and Opinion     Referral Reason:   Specialty Services Required     Referred to Provider:   Kimberly Ochoa MD     Requested Specialty:   Obstetrics & Gynecology     Number of Visits Requested:   1    POCT Fecal Immunochemical Test (Fit)     Standing Status:   Future     Standing Expiration Date:   10/24/2018     Orders Placed This Encounter   Medications    gabapentin (NEURONTIN) 100 MG capsule     Sig: Take 2 capsules by mouth 2 times daily     Dispense:  120 capsule     Refill:  2       Controlled Substances Monitoring: Attestation: The Prescription Monitoring Report for this patient was reviewed today. Alf Caldwell DO)  Documentation: No signs of potential drug abuse or diversion identified. (Alf Caldwell DO)    No Follow-up on file. I, Chava Jefferson CMA   , am scribing for and in the presence of Kassidy Slade DO. Electronically signed by :  Chava Addison DO, personally performed the services described in this documentation, as scribed by Jacqueline Mckeon CMA     in my presence, and it is both accurate and complete.  Electronically signed by: Kassidy Slade DO    10/24/17 4:32 PM    Kassidy Slade DO

## 2017-11-15 ENCOUNTER — HOSPITAL ENCOUNTER (OUTPATIENT)
Dept: ORTHOPEDIC SURGERY | Age: 52
Discharge: HOME | End: 2017-11-15
Payer: COMMERCIAL

## 2017-11-15 DIAGNOSIS — M17.12 PRIMARY LOCALIZED OSTEOARTHROSIS OF LEFT LOWER LEG: ICD-10-CM

## 2017-11-15 DIAGNOSIS — M17.11 PRIMARY LOCALIZED OSTEOARTHROSIS OF RIGHT LOWER LEG: ICD-10-CM

## 2017-11-15 PROCEDURE — 73560 X-RAY EXAM OF KNEE 1 OR 2: CPT

## 2018-01-15 ENCOUNTER — OFFICE VISIT (OUTPATIENT)
Dept: PRIMARY CARE CLINIC | Age: 53
End: 2018-01-15

## 2018-01-15 VITALS
HEIGHT: 67 IN | DIASTOLIC BLOOD PRESSURE: 82 MMHG | BODY MASS INDEX: 40.18 KG/M2 | HEART RATE: 56 BPM | WEIGHT: 256 LBS | TEMPERATURE: 97.8 F | SYSTOLIC BLOOD PRESSURE: 128 MMHG | RESPIRATION RATE: 14 BRPM

## 2018-01-15 DIAGNOSIS — M25.562 CHRONIC PAIN OF BOTH KNEES: Primary | ICD-10-CM

## 2018-01-15 DIAGNOSIS — K08.409 STATUS POST TOOTH EXTRACTION: ICD-10-CM

## 2018-01-15 DIAGNOSIS — G89.29 CHRONIC PAIN OF BOTH KNEES: Primary | ICD-10-CM

## 2018-01-15 DIAGNOSIS — M25.561 CHRONIC PAIN OF BOTH KNEES: Primary | ICD-10-CM

## 2018-01-15 DIAGNOSIS — I10 ESSENTIAL HYPERTENSION: ICD-10-CM

## 2018-01-15 DIAGNOSIS — E55.9 VITAMIN D DEFICIENCY: ICD-10-CM

## 2018-01-15 PROCEDURE — 99213 OFFICE O/P EST LOW 20 MIN: CPT | Performed by: FAMILY MEDICINE

## 2018-01-15 RX ORDER — LIDOCAINE 50 MG/G
2 PATCH TOPICAL DAILY
Qty: 60 PATCH | Refills: 3 | Status: ON HOLD | OUTPATIENT
Start: 2018-01-15 | End: 2018-05-24 | Stop reason: HOSPADM

## 2018-01-15 RX ORDER — ERGOCALCIFEROL 1.25 MG/1
CAPSULE ORAL
Qty: 24 CAPSULE | Refills: 3 | Status: SHIPPED | OUTPATIENT
Start: 2018-01-15 | End: 2018-09-05

## 2018-01-15 ASSESSMENT — ENCOUNTER SYMPTOMS
NAUSEA: 0
ORTHOPNEA: 0
CONSTIPATION: 0
EYE PAIN: 0
SHORTNESS OF BREATH: 0
COLOR CHANGE: 0
CHEST TIGHTNESS: 0
CHOKING: 0
ABDOMINAL PAIN: 0
EYE DISCHARGE: 0
DIARRHEA: 0
BLURRED VISION: 0
APNEA: 0
FACIAL SWELLING: 0
WHEEZING: 0
EYE REDNESS: 0
STRIDOR: 0
PHOTOPHOBIA: 0

## 2018-01-15 NOTE — PROGRESS NOTES
Subjective:      Patient ID: Savanah Luciano is a 47 y.o. female who presents today for:  Chief Complaint   Patient presents with    Hypertension     Patient is here for a follow up. Patient states things are going well. Pt denies any headaches, chest pain, blurry vision, or sob.  Knee Pain     pt would like to discuss options for knee pain. patient states that she knees bilateral knee replacements but she cannot do it right now and would like to discuss other options.  Other     pt would like the trigeminal neuraligia removed from her record. Patient states that she went to the dentist and had a bad tooth infection, got her tooth pulled and has had no problems since. Hypertension   This is a chronic problem. The current episode started more than 1 year ago. The problem is controlled. Pertinent negatives include no anxiety, blurred vision, chest pain, headaches, malaise/fatigue, neck pain, orthopnea, palpitations, peripheral edema, PND, shortness of breath or sweats. Risk factors for coronary artery disease include obesity. Treatments tried: coreg 12.5 MG bid. The current treatment provides moderate improvement. There are no compliance problems. Knee Pain    The incident occurred more than 1 week ago. There was no injury mechanism. The pain is present in the left knee and right knee. The quality of the pain is described as aching (stiffness). The pain is at a severity of 3/10. The pain is mild. The pain has been worsening since onset. Associated symptoms include an inability to bear weight, a loss of motion and muscle weakness. Pertinent negatives include no loss of sensation, numbness or tingling. She reports no foreign bodies present. The symptoms are aggravated by movement, weight bearing and palpation. Treatments tried: 2 tylenol, 1 motrin BID. The treatment provided mild relief. States that she knows she needs bilateral knee replacements but states that she can't do this right now.  She currently takes 2 tylenol & 1 motrin twice a day. She states that she has stiffness in her knees all the time. She has seen Dr. Laurel Clifton & he has told her she needs replacements. She has already had synvisc injections done & states that they didn't help at all. States that she would like \"trigeminal neuralgia\" removed from her record. States that she went to the dentist recently & they told her she had a bad tooth infection. She got her tooth pulled from the right upper side. States that since she got this tooth pulled that she has had no problems & no pain. Past Medical History:   Diagnosis Date    Allergic rhinitis 3/8/2016    Chronic bilateral low back pain without sciatica 12/19/2016    Diverticulosis 8/27/2013    Drug addiction, Pts. daughter 12/19/2016    Hypertension     Localized edema 8/30/2016    Osteoarthritis     Osteoarthritis of spine with radiculopathy, lumbar region, Sertich 6/27/2017    Postprandial diarrhea 8/27/2013    Renal stones     S/P cholecystectomy, 2008 8/27/2013    Screen for colon cancer 8/30/2016    Seasonal allergies 8/11/2015    Status post tooth extraction, upper right side, caused dental pain 1/15/2018    Stress 8/27/2013     Past Surgical History:   Procedure Laterality Date   49 [facility]    CHOLECYSTECTOMY      2009    FOOT SURGERY      Left foot [address] [facility]      2009    HYSTERECTOMY  2006     Family History   Problem Relation Age of Onset    High Blood Pressure Mother     Other Mother      Hypothyrodism    Cancer Father      prostate    Arthritis Father     Arthritis Brother     Diabetes Maternal Grandmother     Depression Paternal Grandfather     Diabetes Paternal Grandmother      Social History     Social History    Marital status:      Spouse name: N/A    Number of children: N/A    Years of education: N/A     Occupational History    Not on file.      Social History Main Topics    Smoking status: Never Smoker    Smokeless tobacco: Never Used    Alcohol use No    Drug use: No    Sexual activity: Not on file     Other Topics Concern    Not on file     Social History Narrative    No narrative on file     Allergies:  Morphine; Celebrex [celecoxib]; and Medrol [methylprednisolone]    Review of Systems   Constitutional: Negative for activity change, appetite change, chills, diaphoresis, fever and malaise/fatigue. HENT: Negative for congestion, ear discharge, ear pain, facial swelling, hearing loss and mouth sores. Eyes: Negative for blurred vision, photophobia, pain, discharge and redness. Respiratory: Negative for apnea, choking, chest tightness, shortness of breath, wheezing and stridor. Cardiovascular: Negative for chest pain, palpitations, orthopnea, leg swelling and PND. Gastrointestinal: Negative for abdominal pain, constipation, diarrhea and nausea. Endocrine: Negative for cold intolerance, heat intolerance, polydipsia and polyphagia. Genitourinary: Negative for dysuria and frequency. Musculoskeletal: Negative for gait problem, joint swelling, neck pain and neck stiffness. Skin: Negative for color change, pallor, rash and wound. Allergic/Immunologic: Negative for immunocompromised state. Neurological: Negative for tingling, tremors, syncope, facial asymmetry, speech difficulty, numbness and headaches. Psychiatric/Behavioral: Negative for confusion and hallucinations. The patient is not nervous/anxious and is not hyperactive. Objective:   /82 (Site: Right Arm, Position: Sitting, Cuff Size: Large Adult)   Pulse 56   Temp 97.8 °F (36.6 °C) (Tympanic)   Resp 14   Ht 5' 7\" (1.702 m)   Wt 256 lb (116.1 kg)   BMI 40.10 kg/m²     Physical Exam   Constitutional: She is oriented to person, place, and time. She appears well-developed and well-nourished. HENT:   Head: Normocephalic and atraumatic.    Nose: Nose normal.   Eyes: Conjunctivae and EOM are normal. Pupils

## 2018-01-18 ENCOUNTER — OFFICE VISIT (OUTPATIENT)
Dept: OBGYN | Age: 53
End: 2018-01-18

## 2018-01-18 VITALS
BODY MASS INDEX: 40.34 KG/M2 | SYSTOLIC BLOOD PRESSURE: 102 MMHG | DIASTOLIC BLOOD PRESSURE: 80 MMHG | WEIGHT: 257 LBS | HEIGHT: 67 IN

## 2018-01-18 DIAGNOSIS — Z11.51 SCREENING FOR HPV (HUMAN PAPILLOMAVIRUS): ICD-10-CM

## 2018-01-18 DIAGNOSIS — N95.1 MENOPAUSE SYNDROME: ICD-10-CM

## 2018-01-18 DIAGNOSIS — Z01.419 WELL WOMAN EXAM WITH ROUTINE GYNECOLOGICAL EXAM: Primary | ICD-10-CM

## 2018-01-18 LAB
FOLLICLE STIMULATING HORMONE: 52.9 MIU/ML
T4 FREE: 1.35 NG/DL (ref 0.93–1.7)
TSH SERPL DL<=0.05 MIU/L-ACNC: 2.32 UIU/ML (ref 0.27–4.2)

## 2018-01-18 PROCEDURE — 99386 PREV VISIT NEW AGE 40-64: CPT | Performed by: OBSTETRICS & GYNECOLOGY

## 2018-01-18 NOTE — PROGRESS NOTES
colonoscopy and pt declines at this time  FSH/TSH/FT4 pending   Past medical, social and family history reviewed and updated in pt's chart. Routine health screenings and precautions discussed.

## 2018-01-19 ENCOUNTER — TELEPHONE (OUTPATIENT)
Dept: OBGYN | Age: 53
End: 2018-01-19

## 2018-01-19 DIAGNOSIS — Z01.419 WELL WOMAN EXAM WITH ROUTINE GYNECOLOGICAL EXAM: ICD-10-CM

## 2018-01-19 DIAGNOSIS — Z11.51 SCREENING FOR HPV (HUMAN PAPILLOMAVIRUS): ICD-10-CM

## 2018-01-19 NOTE — TELEPHONE ENCOUNTER
----- Message from Kevin Montes MD sent at 1/19/2018  8:35 AM EST -----  May advise pt that hormone are in the menopausal range now

## 2018-01-25 LAB
HPV COMMENT: NORMAL
HPV TYPE 16: NOT DETECTED
HPV TYPE 18: NOT DETECTED
HPVOH (OTHER TYPES): NOT DETECTED

## 2018-01-29 ENCOUNTER — TELEPHONE (OUTPATIENT)
Dept: OBGYN CLINIC | Age: 53
End: 2018-01-29

## 2018-01-29 NOTE — TELEPHONE ENCOUNTER
Patient hormone levels show menopausal and patient interested in knowing if she is done with menopause? Please advise.

## 2018-02-02 RX ORDER — GABAPENTIN 100 MG/1
200 CAPSULE ORAL 2 TIMES DAILY
Qty: 120 CAPSULE | Refills: 2 | Status: ON HOLD | OUTPATIENT
Start: 2018-02-02 | End: 2018-05-21 | Stop reason: HOSPADM

## 2018-02-02 NOTE — TELEPHONE ENCOUNTER
From: Miriam Maurice  Sent: 2/2/2018 12:41 PM EST  Subject: Medication Renewal Request    Jossy Swenson would like a refill of the following medications:  gabapentin (NEURONTIN) 100 MG capsule Leslee Rubin DO]    Preferred pharmacy: [facility] [place] [id number] - [place], [address]    Comment:

## 2018-02-15 ENCOUNTER — OFFICE VISIT (OUTPATIENT)
Dept: FAMILY MEDICINE CLINIC | Age: 53
End: 2018-02-15
Payer: COMMERCIAL

## 2018-02-15 VITALS
SYSTOLIC BLOOD PRESSURE: 140 MMHG | BODY MASS INDEX: 40.47 KG/M2 | HEART RATE: 88 BPM | WEIGHT: 258.4 LBS | DIASTOLIC BLOOD PRESSURE: 96 MMHG | TEMPERATURE: 98 F | RESPIRATION RATE: 12 BRPM

## 2018-02-15 DIAGNOSIS — I10 ESSENTIAL HYPERTENSION: Primary | ICD-10-CM

## 2018-02-15 PROCEDURE — 99213 OFFICE O/P EST LOW 20 MIN: CPT | Performed by: NURSE PRACTITIONER

## 2018-02-15 ASSESSMENT — ENCOUNTER SYMPTOMS
CONSTIPATION: 0
WHEEZING: 0
NAUSEA: 0
ABDOMINAL PAIN: 0
DIARRHEA: 0
CHEST TIGHTNESS: 0
VOMITING: 0
SHORTNESS OF BREATH: 0

## 2018-02-15 NOTE — PROGRESS NOTES
tablet 3    ranitidine (ZANTAC) 150 MG tablet Take 150 mg by mouth 2 times daily as needed        No current facility-administered medications on file prior to visit. Allergies:  Morphine; Celebrex [celecoxib]; and Medrol [methylprednisolone]    Review of Systems   Constitutional: Negative for activity change, appetite change, chills, diaphoresis, fatigue and fever. Respiratory: Negative for chest tightness, shortness of breath and wheezing. Cardiovascular: Positive for palpitations. Negative for chest pain. Gastrointestinal: Negative for abdominal pain, constipation, diarrhea, nausea and vomiting. Allergic/Immunologic: Negative for environmental allergies and food allergies. Neurological: Positive for headaches. Negative for dizziness, seizures, syncope, facial asymmetry, speech difficulty, weakness, light-headedness and numbness. Objective:   BP (!) 140/96   Pulse 88   Temp 98 °F (36.7 °C) (Temporal)   Resp 12   Wt 258 lb 6.4 oz (117.2 kg)   BMI 40.47 kg/m²     Physical Exam   Constitutional: She is oriented to person, place, and time. Vital signs are normal. She appears well-developed and well-nourished. HENT:   Head: Normocephalic. Right Ear: Hearing, tympanic membrane, external ear and ear canal normal.   Left Ear: Hearing, tympanic membrane, external ear and ear canal normal.   Nose: Nose normal.   Mouth/Throat: Uvula is midline, oropharynx is clear and moist and mucous membranes are normal.   Eyes: Conjunctivae and EOM are normal.   Neck: Normal range of motion. Neck supple. Cardiovascular: Normal pulses. Pulmonary/Chest: Effort normal and breath sounds normal.   Abdominal: Normal appearance. Musculoskeletal: Normal range of motion. Lymphadenopathy:        Head (right side): No submental, no submandibular, no tonsillar, no preauricular, no posterior auricular and no occipital adenopathy present.         Head (left side): No submental, no submandibular, no tonsillar,

## 2018-02-16 ENCOUNTER — PATIENT MESSAGE (OUTPATIENT)
Dept: PRIMARY CARE CLINIC | Age: 53
End: 2018-02-16

## 2018-02-19 RX ORDER — CARVEDILOL 6.25 MG/1
6.25 TABLET ORAL 2 TIMES DAILY
Qty: 60 TABLET | Refills: 3 | Status: ON HOLD | OUTPATIENT
Start: 2018-02-19 | End: 2018-05-21 | Stop reason: HOSPADM

## 2018-02-22 ENCOUNTER — HOSPITAL ENCOUNTER (OUTPATIENT)
Dept: GENERAL RADIOLOGY | Age: 53
Discharge: HOME | End: 2018-02-24
Payer: COMMERCIAL

## 2018-02-22 DIAGNOSIS — M54.5 LOW BACK PAIN, UNSPECIFIED BACK PAIN LATERALITY, UNSPECIFIED CHRONICITY, WITH SCIATICA PRESENCE UNSPECIFIED: ICD-10-CM

## 2018-02-22 PROCEDURE — 72110 X-RAY EXAM L-2 SPINE 4/>VWS: CPT

## 2018-05-02 DIAGNOSIS — Z01.818 PRE-OPERATIVE EXAM: ICD-10-CM

## 2018-05-02 LAB
ALBUMIN SERPL-MCNC: 4.2 G/DL (ref 3.9–4.9)
ALP BLD-CCNC: 93 U/L (ref 40–130)
ALT SERPL-CCNC: 18 U/L (ref 0–33)
ANION GAP SERPL CALCULATED.3IONS-SCNC: 16 MEQ/L (ref 7–13)
APTT: 25.4 SEC (ref 21.6–35.4)
AST SERPL-CCNC: 15 U/L (ref 0–35)
BASOPHILS ABSOLUTE: 0.1 K/UL (ref 0–0.2)
BASOPHILS RELATIVE PERCENT: 0.8 %
BILIRUB SERPL-MCNC: 0.3 MG/DL (ref 0–1.2)
BUN BLDV-MCNC: 19 MG/DL (ref 6–20)
CALCIUM SERPL-MCNC: 9.2 MG/DL (ref 8.6–10.2)
CHLORIDE BLD-SCNC: 100 MEQ/L (ref 98–107)
CO2: 26 MEQ/L (ref 22–29)
CREAT SERPL-MCNC: 0.39 MG/DL (ref 0.5–0.9)
EOSINOPHILS ABSOLUTE: 0.3 K/UL (ref 0–0.7)
EOSINOPHILS RELATIVE PERCENT: 3.3 %
GFR AFRICAN AMERICAN: >60
GFR NON-AFRICAN AMERICAN: >60
GLOBULIN: 2.5 G/DL (ref 2.3–3.5)
GLUCOSE BLD-MCNC: 74 MG/DL (ref 74–109)
HCT VFR BLD CALC: 43.9 % (ref 37–47)
HEMOGLOBIN: 14.6 G/DL (ref 12–16)
INR BLD: 1
LYMPHOCYTES ABSOLUTE: 2 K/UL (ref 1–4.8)
LYMPHOCYTES RELATIVE PERCENT: 22.7 %
MCH RBC QN AUTO: 30.2 PG (ref 27–31.3)
MCHC RBC AUTO-ENTMCNC: 33.3 % (ref 33–37)
MCV RBC AUTO: 90.6 FL (ref 82–100)
MONOCYTES ABSOLUTE: 0.6 K/UL (ref 0.2–0.8)
MONOCYTES RELATIVE PERCENT: 6.6 %
NEUTROPHILS ABSOLUTE: 5.7 K/UL (ref 1.4–6.5)
NEUTROPHILS RELATIVE PERCENT: 66.6 %
PDW BLD-RTO: 13.5 % (ref 11.5–14.5)
PLATELET # BLD: 225 K/UL (ref 130–400)
POTASSIUM SERPL-SCNC: 4.7 MEQ/L (ref 3.5–5.1)
PROTHROMBIN TIME: 10.2 SEC (ref 9.6–12.3)
RBC # BLD: 4.85 M/UL (ref 4.2–5.4)
SODIUM BLD-SCNC: 142 MEQ/L (ref 132–144)
TOTAL PROTEIN: 6.7 G/DL (ref 6.4–8.1)
WBC # BLD: 8.6 K/UL (ref 4.8–10.8)

## 2018-05-04 ENCOUNTER — OFFICE VISIT (OUTPATIENT)
Dept: PRIMARY CARE CLINIC | Age: 53
End: 2018-05-04
Payer: COMMERCIAL

## 2018-05-04 VITALS
HEART RATE: 80 BPM | BODY MASS INDEX: 38.92 KG/M2 | TEMPERATURE: 98 F | SYSTOLIC BLOOD PRESSURE: 122 MMHG | DIASTOLIC BLOOD PRESSURE: 80 MMHG | WEIGHT: 248 LBS | RESPIRATION RATE: 16 BRPM | HEIGHT: 67 IN

## 2018-05-04 DIAGNOSIS — I10 ESSENTIAL HYPERTENSION: ICD-10-CM

## 2018-05-04 DIAGNOSIS — M26.629 ARTHRALGIA OF TEMPOROMANDIBULAR JOINT, UNSPECIFIED LATERALITY: ICD-10-CM

## 2018-05-04 DIAGNOSIS — M43.10 DEGENERATIVE SPONDYLOLISTHESIS: ICD-10-CM

## 2018-05-04 DIAGNOSIS — R73.9 HYPERGLYCEMIA: ICD-10-CM

## 2018-05-04 DIAGNOSIS — J30.2 CHRONIC SEASONAL ALLERGIC RHINITIS, UNSPECIFIED TRIGGER: ICD-10-CM

## 2018-05-04 DIAGNOSIS — Z01.818 PREOP EXAMINATION: Primary | ICD-10-CM

## 2018-05-04 DIAGNOSIS — K52.9 POSTPRANDIAL DIARRHEA: ICD-10-CM

## 2018-05-04 DIAGNOSIS — M47.26 OSTEOARTHRITIS OF SPINE WITH RADICULOPATHY, LUMBAR REGION: ICD-10-CM

## 2018-05-04 LAB
BILIRUBIN, POC: NORMAL
BLOOD URINE, POC: NORMAL
CLARITY, POC: CLEAR
COLOR, POC: YELLOW
GLUCOSE URINE, POC: NORMAL
HBA1C MFR BLD: 5.4 % (ref 4.8–5.9)
KETONES, POC: NORMAL
LEUKOCYTE EST, POC: NORMAL
NITRITE, POC: NORMAL
PH, POC: 6
PROTEIN, POC: NORMAL
SPECIFIC GRAVITY, POC: 1.02
UROBILINOGEN, POC: NORMAL

## 2018-05-04 PROCEDURE — 93000 ELECTROCARDIOGRAM COMPLETE: CPT | Performed by: FAMILY MEDICINE

## 2018-05-04 PROCEDURE — 81002 URINALYSIS NONAUTO W/O SCOPE: CPT | Performed by: FAMILY MEDICINE

## 2018-05-04 PROCEDURE — 99214 OFFICE O/P EST MOD 30 MIN: CPT | Performed by: FAMILY MEDICINE

## 2018-05-04 RX ORDER — CARVEDILOL 12.5 MG/1
12.5 TABLET ORAL 2 TIMES DAILY
Qty: 60 TABLET | Refills: 11 | Status: SHIPPED | OUTPATIENT
Start: 2018-05-04 | End: 2019-02-04 | Stop reason: SDUPTHER

## 2018-05-04 RX ORDER — TRIAMCINOLONE ACETONIDE 40 MG/ML
80 INJECTION, SUSPENSION INTRA-ARTICULAR; INTRAMUSCULAR ONCE
Status: DISCONTINUED | OUTPATIENT
Start: 2018-05-04 | End: 2018-05-04

## 2018-05-04 RX ORDER — IBUPROFEN 800 MG/1
TABLET ORAL
Qty: 60 TABLET | Refills: 11 | Status: ON HOLD | OUTPATIENT
Start: 2018-05-04 | End: 2018-05-21 | Stop reason: HOSPADM

## 2018-05-04 ASSESSMENT — ENCOUNTER SYMPTOMS
RHINORRHEA: 1
FACIAL SWELLING: 0
WHEEZING: 0
DIARRHEA: 0
ABDOMINAL PAIN: 0
COLOR CHANGE: 0
PHOTOPHOBIA: 0
EYE DISCHARGE: 0
STRIDOR: 0
CHOKING: 0
NAUSEA: 0
EYE REDNESS: 0
EYE ITCHING: 1
CHEST TIGHTNESS: 0
EYE PAIN: 0
APNEA: 0
CONSTIPATION: 0

## 2018-05-04 ASSESSMENT — PATIENT HEALTH QUESTIONNAIRE - PHQ9
1. LITTLE INTEREST OR PLEASURE IN DOING THINGS: 0
SUM OF ALL RESPONSES TO PHQ9 QUESTIONS 1 & 2: 0
2. FEELING DOWN, DEPRESSED OR HOPELESS: 0
SUM OF ALL RESPONSES TO PHQ QUESTIONS 1-9: 0

## 2018-05-16 ENCOUNTER — HOSPITAL ENCOUNTER (OUTPATIENT)
Dept: PREADMISSION TESTING | Age: 53
Discharge: HOME | DRG: 460 | End: 2018-05-20
Payer: COMMERCIAL

## 2018-05-16 VITALS
BODY MASS INDEX: 39.53 KG/M2 | OXYGEN SATURATION: 97 % | SYSTOLIC BLOOD PRESSURE: 154 MMHG | RESPIRATION RATE: 18 BRPM | HEIGHT: 66 IN | HEART RATE: 76 BPM | WEIGHT: 246 LBS | DIASTOLIC BLOOD PRESSURE: 77 MMHG | TEMPERATURE: 97 F

## 2018-05-16 LAB
ABO/RH: NORMAL
ANTIBODY SCREEN: NORMAL

## 2018-05-16 PROCEDURE — 86900 BLOOD TYPING SEROLOGIC ABO: CPT

## 2018-05-16 PROCEDURE — 86850 RBC ANTIBODY SCREEN: CPT

## 2018-05-16 PROCEDURE — 86901 BLOOD TYPING SEROLOGIC RH(D): CPT

## 2018-05-16 RX ORDER — TRAMADOL HYDROCHLORIDE 50 MG/1
50 TABLET ORAL EVERY 6 HOURS PRN
COMMUNITY
End: 2018-09-05

## 2018-05-16 RX ORDER — SODIUM CHLORIDE, SODIUM LACTATE, POTASSIUM CHLORIDE, CALCIUM CHLORIDE 600; 310; 30; 20 MG/100ML; MG/100ML; MG/100ML; MG/100ML
INJECTION, SOLUTION INTRAVENOUS CONTINUOUS
Status: CANCELLED | OUTPATIENT
Start: 2018-05-16

## 2018-05-16 RX ORDER — SODIUM CHLORIDE 0.9 % (FLUSH) 0.9 %
10 SYRINGE (ML) INJECTION PRN
Status: CANCELLED | OUTPATIENT
Start: 2018-05-16

## 2018-05-16 RX ORDER — LIDOCAINE HYDROCHLORIDE 10 MG/ML
1 INJECTION, SOLUTION EPIDURAL; INFILTRATION; INTRACAUDAL; PERINEURAL
Status: CANCELLED | OUTPATIENT
Start: 2018-05-16 | End: 2018-05-16

## 2018-05-16 RX ORDER — SODIUM CHLORIDE 0.9 % (FLUSH) 0.9 %
10 SYRINGE (ML) INJECTION EVERY 12 HOURS SCHEDULED
Status: CANCELLED | OUTPATIENT
Start: 2018-05-16

## 2018-05-16 RX ORDER — ACETAMINOPHEN 500 MG
500 TABLET ORAL EVERY 6 HOURS PRN
Status: ON HOLD | COMMUNITY
End: 2018-09-29 | Stop reason: HOSPADM

## 2018-05-20 ENCOUNTER — NURSE TRIAGE (OUTPATIENT)
Dept: OTHER | Facility: CLINIC | Age: 53
End: 2018-05-20

## 2018-05-21 ENCOUNTER — HOSPITAL ENCOUNTER (OUTPATIENT)
Age: 53
Setting detail: OBSERVATION
Discharge: HOME | DRG: 460 | End: 2018-05-21
Attending: INTERNAL MEDICINE | Admitting: INTERNAL MEDICINE
Payer: COMMERCIAL

## 2018-05-21 VITALS
SYSTOLIC BLOOD PRESSURE: 123 MMHG | HEART RATE: 81 BPM | RESPIRATION RATE: 16 BRPM | DIASTOLIC BLOOD PRESSURE: 62 MMHG | TEMPERATURE: 98.4 F | OXYGEN SATURATION: 94 %

## 2018-05-21 DIAGNOSIS — M54.16 LUMBAR RADICULOPATHY: ICD-10-CM

## 2018-05-21 DIAGNOSIS — Z98.1 STATUS POST LUMBAR SPINAL FUSION: ICD-10-CM

## 2018-05-21 DIAGNOSIS — M47.26 OSTEOARTHRITIS OF SPINE WITH RADICULOPATHY, LUMBAR REGION: ICD-10-CM

## 2018-05-21 DIAGNOSIS — M48.062 SPINAL STENOSIS OF LUMBAR REGION WITH NEUROGENIC CLAUDICATION: Primary | Chronic | ICD-10-CM

## 2018-05-21 PROBLEM — M26.609 TEMPOROMANDIBULAR JOINT DISORDER: Status: ACTIVE | Noted: 2018-05-21

## 2018-05-21 PROBLEM — M54.10 RADICULOPATHY: Status: ACTIVE | Noted: 2018-05-21

## 2018-05-21 PROCEDURE — 96374 THER/PROPH/DIAG INJ IV PUSH: CPT

## 2018-05-21 PROCEDURE — 97162 PT EVAL MOD COMPLEX 30 MIN: CPT

## 2018-05-21 PROCEDURE — G8979 MOBILITY GOAL STATUS: HCPCS

## 2018-05-21 PROCEDURE — G0378 HOSPITAL OBSERVATION PER HR: HCPCS

## 2018-05-21 PROCEDURE — 97166 OT EVAL MOD COMPLEX 45 MIN: CPT

## 2018-05-21 PROCEDURE — G8988 SELF CARE GOAL STATUS: HCPCS

## 2018-05-21 PROCEDURE — 2580000003 HC RX 258: Performed by: INTERNAL MEDICINE

## 2018-05-21 PROCEDURE — 97110 THERAPEUTIC EXERCISES: CPT

## 2018-05-21 PROCEDURE — 6360000002 HC RX W HCPCS: Performed by: INTERNAL MEDICINE

## 2018-05-21 PROCEDURE — G8987 SELF CARE CURRENT STATUS: HCPCS

## 2018-05-21 PROCEDURE — G8978 MOBILITY CURRENT STATUS: HCPCS

## 2018-05-21 PROCEDURE — 6370000000 HC RX 637 (ALT 250 FOR IP): Performed by: INTERNAL MEDICINE

## 2018-05-21 PROCEDURE — 6370000000 HC RX 637 (ALT 250 FOR IP): Performed by: NURSE PRACTITIONER

## 2018-05-21 RX ORDER — CYCLOBENZAPRINE HCL 10 MG
10 TABLET ORAL 3 TIMES DAILY PRN
Status: DISCONTINUED | OUTPATIENT
Start: 2018-05-21 | End: 2018-05-21 | Stop reason: HOSPADM

## 2018-05-21 RX ORDER — LIDOCAINE 50 MG/G
2 PATCH TOPICAL DAILY
Status: DISCONTINUED | OUTPATIENT
Start: 2018-05-21 | End: 2018-05-21 | Stop reason: HOSPADM

## 2018-05-21 RX ORDER — FAMOTIDINE 20 MG/1
40 TABLET, FILM COATED ORAL EVERY EVENING
Status: DISCONTINUED | OUTPATIENT
Start: 2018-05-21 | End: 2018-05-21 | Stop reason: HOSPADM

## 2018-05-21 RX ORDER — SODIUM CHLORIDE 0.9 % (FLUSH) 0.9 %
10 SYRINGE (ML) INJECTION PRN
Status: DISCONTINUED | OUTPATIENT
Start: 2018-05-21 | End: 2018-05-21 | Stop reason: HOSPADM

## 2018-05-21 RX ORDER — TRAMADOL HYDROCHLORIDE 50 MG/1
50 TABLET ORAL EVERY 6 HOURS PRN
Status: DISCONTINUED | OUTPATIENT
Start: 2018-05-21 | End: 2018-05-21 | Stop reason: HOSPADM

## 2018-05-21 RX ORDER — SODIUM CHLORIDE 0.9 % (FLUSH) 0.9 %
10 SYRINGE (ML) INJECTION EVERY 12 HOURS SCHEDULED
Status: DISCONTINUED | OUTPATIENT
Start: 2018-05-21 | End: 2018-05-21 | Stop reason: HOSPADM

## 2018-05-21 RX ORDER — GABAPENTIN 100 MG/1
200 CAPSULE ORAL 2 TIMES DAILY
Status: DISCONTINUED | OUTPATIENT
Start: 2018-05-21 | End: 2018-05-21 | Stop reason: HOSPADM

## 2018-05-21 RX ORDER — HYDROCODONE BITARTRATE AND ACETAMINOPHEN 5; 325 MG/1; MG/1
1 TABLET ORAL EVERY 6 HOURS PRN
Qty: 12 TABLET | Refills: 0 | Status: ON HOLD | OUTPATIENT
Start: 2018-05-21 | End: 2018-05-24 | Stop reason: HOSPADM

## 2018-05-21 RX ORDER — ONDANSETRON 2 MG/ML
4 INJECTION INTRAMUSCULAR; INTRAVENOUS EVERY 6 HOURS PRN
Status: DISCONTINUED | OUTPATIENT
Start: 2018-05-21 | End: 2018-05-21 | Stop reason: HOSPADM

## 2018-05-21 RX ORDER — GABAPENTIN 100 MG/1
200 CAPSULE ORAL 2 TIMES DAILY
Qty: 8 CAPSULE | Refills: 0 | Status: SHIPPED | OUTPATIENT
Start: 2018-05-21 | End: 2019-02-04 | Stop reason: ALTCHOICE

## 2018-05-21 RX ORDER — CARVEDILOL 12.5 MG/1
12.5 TABLET ORAL 2 TIMES DAILY
Status: DISCONTINUED | OUTPATIENT
Start: 2018-05-21 | End: 2018-05-21 | Stop reason: HOSPADM

## 2018-05-21 RX ORDER — KETOROLAC TROMETHAMINE 30 MG/ML
30 INJECTION, SOLUTION INTRAMUSCULAR; INTRAVENOUS EVERY 6 HOURS PRN
Status: DISCONTINUED | OUTPATIENT
Start: 2018-05-21 | End: 2018-05-21

## 2018-05-21 RX ORDER — HYDROCODONE BITARTRATE AND ACETAMINOPHEN 5; 325 MG/1; MG/1
1 TABLET ORAL EVERY 6 HOURS PRN
Status: DISCONTINUED | OUTPATIENT
Start: 2018-05-21 | End: 2018-05-21 | Stop reason: HOSPADM

## 2018-05-21 RX ADMIN — HYDROCODONE BITARTRATE AND ACETAMINOPHEN 1 TABLET: 5; 325 TABLET ORAL at 15:47

## 2018-05-21 RX ADMIN — Medication 10 ML: at 09:31

## 2018-05-21 RX ADMIN — Medication 10 ML: at 04:40

## 2018-05-21 RX ADMIN — Medication 1 MG: at 04:39

## 2018-05-21 RX ADMIN — CARVEDILOL 12.5 MG: 12.5 TABLET, FILM COATED ORAL at 09:26

## 2018-05-21 RX ADMIN — CYCLOBENZAPRINE HYDROCHLORIDE 10 MG: 10 TABLET, FILM COATED ORAL at 09:27

## 2018-05-21 RX ADMIN — TRAMADOL HYDROCHLORIDE 50 MG: 50 TABLET, FILM COATED ORAL at 09:26

## 2018-05-21 RX ADMIN — GABAPENTIN 200 MG: 100 CAPSULE ORAL at 09:27

## 2018-05-21 ASSESSMENT — PAIN DESCRIPTION - DESCRIPTORS: DESCRIPTORS: STABBING

## 2018-05-21 ASSESSMENT — PAIN DESCRIPTION - FREQUENCY: FREQUENCY: CONTINUOUS

## 2018-05-21 ASSESSMENT — PAIN SCALES - GENERAL
PAINLEVEL_OUTOF10: 8
PAINLEVEL_OUTOF10: 8
PAINLEVEL_OUTOF10: 7
PAINLEVEL_OUTOF10: 8

## 2018-05-21 ASSESSMENT — PAIN DESCRIPTION - PAIN TYPE: TYPE: ACUTE PAIN;CHRONIC PAIN

## 2018-05-21 ASSESSMENT — PAIN DESCRIPTION - LOCATION: LOCATION: BACK

## 2018-05-21 ASSESSMENT — PAIN DESCRIPTION - DIRECTION: RADIATING_TOWARDS: LEFT LEG

## 2018-05-21 ASSESSMENT — PAIN DESCRIPTION - ORIENTATION: ORIENTATION: LEFT;LOWER

## 2018-05-21 ASSESSMENT — PAIN DESCRIPTION - ONSET: ONSET: ON-GOING

## 2018-05-22 ENCOUNTER — ANESTHESIA EVENT (OUTPATIENT)
Dept: OPERATING ROOM | Age: 53
DRG: 460 | End: 2018-05-22
Payer: COMMERCIAL

## 2018-05-22 ENCOUNTER — CARE COORDINATION (OUTPATIENT)
Dept: CASE MANAGEMENT | Age: 53
End: 2018-05-22

## 2018-05-22 DIAGNOSIS — M26.609 TEMPOROMANDIBULAR JOINT DISORDER: ICD-10-CM

## 2018-05-22 DIAGNOSIS — M54.16 LUMBAR RADICULOPATHY: Primary | ICD-10-CM

## 2018-05-22 PROCEDURE — 1111F DSCHRG MED/CURRENT MED MERGE: CPT | Performed by: FAMILY MEDICINE

## 2018-05-23 ENCOUNTER — APPOINTMENT (OUTPATIENT)
Dept: GENERAL RADIOLOGY | Age: 53
DRG: 460 | End: 2018-05-23
Attending: ORTHOPAEDIC SURGERY
Payer: COMMERCIAL

## 2018-05-23 ENCOUNTER — ANESTHESIA (OUTPATIENT)
Dept: OPERATING ROOM | Age: 53
DRG: 460 | End: 2018-05-23
Payer: COMMERCIAL

## 2018-05-23 ENCOUNTER — HOSPITAL ENCOUNTER (INPATIENT)
Age: 53
LOS: 1 days | Discharge: HOME HEALTH | DRG: 460 | End: 2018-05-24
Attending: ORTHOPAEDIC SURGERY | Admitting: ORTHOPAEDIC SURGERY
Payer: COMMERCIAL

## 2018-05-23 VITALS — SYSTOLIC BLOOD PRESSURE: 104 MMHG | TEMPERATURE: 97.5 F | OXYGEN SATURATION: 92 % | DIASTOLIC BLOOD PRESSURE: 61 MMHG

## 2018-05-23 DIAGNOSIS — M48.062 SPINAL STENOSIS OF LUMBAR REGION WITH NEUROGENIC CLAUDICATION: Primary | Chronic | ICD-10-CM

## 2018-05-23 DIAGNOSIS — Z98.1 STATUS POST LUMBAR SPINAL FUSION: ICD-10-CM

## 2018-05-23 DIAGNOSIS — M48.062 LUMBAR STENOSIS WITH NEUROGENIC CLAUDICATION: ICD-10-CM

## 2018-05-23 PROCEDURE — 0SG00AJ FUSION OF LUMBAR VERTEBRAL JOINT WITH INTERBODY FUSION DEVICE, POSTERIOR APPROACH, ANTERIOR COLUMN, OPEN APPROACH: ICD-10-PCS | Performed by: ORTHOPAEDIC SURGERY

## 2018-05-23 PROCEDURE — 3700000001 HC ADD 15 MINUTES (ANESTHESIA): Performed by: ORTHOPAEDIC SURGERY

## 2018-05-23 PROCEDURE — 6370000000 HC RX 637 (ALT 250 FOR IP): Performed by: NURSE ANESTHETIST, CERTIFIED REGISTERED

## 2018-05-23 PROCEDURE — XRGD0F3 FUSION OF LUMBOSACRAL JOINT USING RADIOLUCENT POROUS INTERBODY FUSION DEVICE, OPEN APPROACH, NEW TECHNOLOGY GROUP 3: ICD-10-PCS | Performed by: ORTHOPAEDIC SURGERY

## 2018-05-23 PROCEDURE — 3600000003 HC SURGERY LEVEL 3 BASE: Performed by: ORTHOPAEDIC SURGERY

## 2018-05-23 PROCEDURE — 6360000002 HC RX W HCPCS: Performed by: NURSE PRACTITIONER

## 2018-05-23 PROCEDURE — 6360000002 HC RX W HCPCS: Performed by: ORTHOPAEDIC SURGERY

## 2018-05-23 PROCEDURE — 6360000002 HC RX W HCPCS: Performed by: ANESTHESIOLOGY

## 2018-05-23 PROCEDURE — 3600000013 HC SURGERY LEVEL 3 ADDTL 15MIN: Performed by: ORTHOPAEDIC SURGERY

## 2018-05-23 PROCEDURE — 6370000000 HC RX 637 (ALT 250 FOR IP): Performed by: ANESTHESIOLOGY

## 2018-05-23 PROCEDURE — C1713 ANCHOR/SCREW BN/BN,TIS/BN: HCPCS | Performed by: ORTHOPAEDIC SURGERY

## 2018-05-23 PROCEDURE — 2580000003 HC RX 258: Performed by: NURSE PRACTITIONER

## 2018-05-23 PROCEDURE — 2580000003 HC RX 258: Performed by: ORTHOPAEDIC SURGERY

## 2018-05-23 PROCEDURE — 3700000000 HC ANESTHESIA ATTENDED CARE: Performed by: ORTHOPAEDIC SURGERY

## 2018-05-23 PROCEDURE — 3209999900 FLUORO FOR SURGICAL PROCEDURES

## 2018-05-23 PROCEDURE — L8699 PROSTHETIC IMPLANT NOS: HCPCS | Performed by: ORTHOPAEDIC SURGERY

## 2018-05-23 PROCEDURE — 6370000000 HC RX 637 (ALT 250 FOR IP): Performed by: ORTHOPAEDIC SURGERY

## 2018-05-23 PROCEDURE — 2500000003 HC RX 250 WO HCPCS: Performed by: NURSE ANESTHETIST, CERTIFIED REGISTERED

## 2018-05-23 PROCEDURE — 2780000010 HC IMPLANT OTHER: Performed by: ORTHOPAEDIC SURGERY

## 2018-05-23 PROCEDURE — 2580000003 HC RX 258: Performed by: NURSE ANESTHETIST, CERTIFIED REGISTERED

## 2018-05-23 PROCEDURE — 2500000003 HC RX 250 WO HCPCS: Performed by: NURSE PRACTITIONER

## 2018-05-23 PROCEDURE — 2720000010 HC SURG SUPPLY STERILE: Performed by: ORTHOPAEDIC SURGERY

## 2018-05-23 PROCEDURE — 0QB00ZZ EXCISION OF LUMBAR VERTEBRA, OPEN APPROACH: ICD-10-PCS | Performed by: ORTHOPAEDIC SURGERY

## 2018-05-23 PROCEDURE — 7100000001 HC PACU RECOVERY - ADDTL 15 MIN: Performed by: ORTHOPAEDIC SURGERY

## 2018-05-23 PROCEDURE — 7100000000 HC PACU RECOVERY - FIRST 15 MIN: Performed by: ORTHOPAEDIC SURGERY

## 2018-05-23 PROCEDURE — 2500000003 HC RX 250 WO HCPCS: Performed by: ORTHOPAEDIC SURGERY

## 2018-05-23 PROCEDURE — 1210000000 HC MED SURG R&B

## 2018-05-23 PROCEDURE — 6360000002 HC RX W HCPCS: Performed by: NURSE ANESTHETIST, CERTIFIED REGISTERED

## 2018-05-23 DEVICE — 5.5MM CURVED ROD, CONE TIP, 50MM
Type: IMPLANTABLE DEVICE | Site: BACK | Status: FUNCTIONAL
Brand: REVERE

## 2018-05-23 DEVICE — RISE SPACER 10X26MM, 7-14MM
Type: IMPLANTABLE DEVICE | Site: BACK | Status: FUNCTIONAL
Brand: RISE

## 2018-05-23 DEVICE — REVOLVE LOCKING CAP
Type: IMPLANTABLE DEVICE | Site: BACK | Status: FUNCTIONAL
Brand: REVOLVE

## 2018-05-23 DEVICE — 6.5MM REVOLVE POLYAXIAL PEDICLE SCREW, 45MM
Type: IMPLANTABLE DEVICE | Site: BACK | Status: FUNCTIONAL
Brand: REVOLVE

## 2018-05-23 DEVICE — 1.6MM K-WIRE, 500MM, BLUNT TIP: Type: IMPLANTABLE DEVICE | Site: BACK | Status: FUNCTIONAL

## 2018-05-23 DEVICE — 6.5MM REVOLVE POLYAXIAL PEDICLE SCREW, 40MM
Type: IMPLANTABLE DEVICE | Site: BACK | Status: FUNCTIONAL
Brand: REVOLVE

## 2018-05-23 RX ORDER — DIPHENHYDRAMINE HYDROCHLORIDE 50 MG/ML
12.5 INJECTION INTRAMUSCULAR; INTRAVENOUS
Status: DISCONTINUED | OUTPATIENT
Start: 2018-05-23 | End: 2018-05-23 | Stop reason: HOSPADM

## 2018-05-23 RX ORDER — DIAZEPAM 5 MG/1
5 TABLET ORAL EVERY 6 HOURS PRN
Status: DISCONTINUED | OUTPATIENT
Start: 2018-05-23 | End: 2018-05-24 | Stop reason: HOSPADM

## 2018-05-23 RX ORDER — SODIUM CHLORIDE 0.9 % (FLUSH) 0.9 %
10 SYRINGE (ML) INJECTION PRN
Status: DISCONTINUED | OUTPATIENT
Start: 2018-05-23 | End: 2018-05-24 | Stop reason: HOSPADM

## 2018-05-23 RX ORDER — MAGNESIUM HYDROXIDE 1200 MG/15ML
LIQUID ORAL CONTINUOUS PRN
Status: COMPLETED | OUTPATIENT
Start: 2018-05-23 | End: 2018-05-23

## 2018-05-23 RX ORDER — ACETAMINOPHEN 325 MG/1
650 TABLET ORAL EVERY 4 HOURS PRN
Status: DISCONTINUED | OUTPATIENT
Start: 2018-05-23 | End: 2018-05-24 | Stop reason: HOSPADM

## 2018-05-23 RX ORDER — WOUND DRESSING ADHESIVE - LIQUID
LIQUID MISCELLANEOUS PRN
Status: DISCONTINUED | OUTPATIENT
Start: 2018-05-23 | End: 2018-05-23 | Stop reason: HOSPADM

## 2018-05-23 RX ORDER — METOCLOPRAMIDE HYDROCHLORIDE 5 MG/ML
10 INJECTION INTRAMUSCULAR; INTRAVENOUS
Status: DISCONTINUED | OUTPATIENT
Start: 2018-05-23 | End: 2018-05-23 | Stop reason: HOSPADM

## 2018-05-23 RX ORDER — MEPERIDINE HYDROCHLORIDE 25 MG/ML
12.5 INJECTION INTRAMUSCULAR; INTRAVENOUS; SUBCUTANEOUS EVERY 5 MIN PRN
Status: DISCONTINUED | OUTPATIENT
Start: 2018-05-23 | End: 2018-05-23 | Stop reason: HOSPADM

## 2018-05-23 RX ORDER — LIDOCAINE HYDROCHLORIDE 10 MG/ML
1 INJECTION, SOLUTION EPIDURAL; INFILTRATION; INTRACAUDAL; PERINEURAL
Status: COMPLETED | OUTPATIENT
Start: 2018-05-23 | End: 2018-05-23

## 2018-05-23 RX ORDER — SODIUM CHLORIDE 0.9 % (FLUSH) 0.9 %
10 SYRINGE (ML) INJECTION EVERY 12 HOURS SCHEDULED
Status: DISCONTINUED | OUTPATIENT
Start: 2018-05-23 | End: 2018-05-23 | Stop reason: HOSPADM

## 2018-05-23 RX ORDER — MIDAZOLAM HYDROCHLORIDE 1 MG/ML
INJECTION INTRAMUSCULAR; INTRAVENOUS PRN
Status: DISCONTINUED | OUTPATIENT
Start: 2018-05-23 | End: 2018-05-23 | Stop reason: SDUPTHER

## 2018-05-23 RX ORDER — SODIUM CHLORIDE 0.9 % (FLUSH) 0.9 %
10 SYRINGE (ML) INJECTION EVERY 12 HOURS SCHEDULED
Status: DISCONTINUED | OUTPATIENT
Start: 2018-05-23 | End: 2018-05-24 | Stop reason: HOSPADM

## 2018-05-23 RX ORDER — SODIUM CHLORIDE, SODIUM LACTATE, POTASSIUM CHLORIDE, CALCIUM CHLORIDE 600; 310; 30; 20 MG/100ML; MG/100ML; MG/100ML; MG/100ML
INJECTION, SOLUTION INTRAVENOUS CONTINUOUS
Status: DISCONTINUED | OUTPATIENT
Start: 2018-05-23 | End: 2018-05-23

## 2018-05-23 RX ORDER — OXYCODONE HYDROCHLORIDE AND ACETAMINOPHEN 5; 325 MG/1; MG/1
1 TABLET ORAL EVERY 4 HOURS PRN
Status: DISCONTINUED | OUTPATIENT
Start: 2018-05-23 | End: 2018-05-24 | Stop reason: HOSPADM

## 2018-05-23 RX ORDER — ONDANSETRON 2 MG/ML
INJECTION INTRAMUSCULAR; INTRAVENOUS PRN
Status: DISCONTINUED | OUTPATIENT
Start: 2018-05-23 | End: 2018-05-23 | Stop reason: SDUPTHER

## 2018-05-23 RX ORDER — HYDROCODONE BITARTRATE AND ACETAMINOPHEN 5; 325 MG/1; MG/1
1 TABLET ORAL PRN
Status: DISCONTINUED | OUTPATIENT
Start: 2018-05-23 | End: 2018-05-23 | Stop reason: HOSPADM

## 2018-05-23 RX ORDER — ROCURONIUM BROMIDE 10 MG/ML
INJECTION, SOLUTION INTRAVENOUS PRN
Status: DISCONTINUED | OUTPATIENT
Start: 2018-05-23 | End: 2018-05-23 | Stop reason: SDUPTHER

## 2018-05-23 RX ORDER — ONDANSETRON 2 MG/ML
4 INJECTION INTRAMUSCULAR; INTRAVENOUS
Status: DISCONTINUED | OUTPATIENT
Start: 2018-05-23 | End: 2018-05-23 | Stop reason: HOSPADM

## 2018-05-23 RX ORDER — ALBUTEROL SULFATE 90 UG/1
AEROSOL, METERED RESPIRATORY (INHALATION) PRN
Status: DISCONTINUED | OUTPATIENT
Start: 2018-05-23 | End: 2018-05-23 | Stop reason: SDUPTHER

## 2018-05-23 RX ORDER — HYDROCODONE BITARTRATE AND ACETAMINOPHEN 5; 325 MG/1; MG/1
2 TABLET ORAL PRN
Status: DISCONTINUED | OUTPATIENT
Start: 2018-05-23 | End: 2018-05-23 | Stop reason: HOSPADM

## 2018-05-23 RX ORDER — SCOLOPAMINE TRANSDERMAL SYSTEM 1 MG/1
1 PATCH, EXTENDED RELEASE TRANSDERMAL
Status: DISCONTINUED | OUTPATIENT
Start: 2018-05-23 | End: 2018-05-24 | Stop reason: HOSPADM

## 2018-05-23 RX ORDER — PROPOFOL 10 MG/ML
INJECTION, EMULSION INTRAVENOUS CONTINUOUS PRN
Status: DISCONTINUED | OUTPATIENT
Start: 2018-05-23 | End: 2018-05-23 | Stop reason: SDUPTHER

## 2018-05-23 RX ORDER — PROPOFOL 10 MG/ML
INJECTION, EMULSION INTRAVENOUS PRN
Status: DISCONTINUED | OUTPATIENT
Start: 2018-05-23 | End: 2018-05-23 | Stop reason: SDUPTHER

## 2018-05-23 RX ORDER — FENTANYL CITRATE 50 UG/ML
INJECTION, SOLUTION INTRAMUSCULAR; INTRAVENOUS PRN
Status: DISCONTINUED | OUTPATIENT
Start: 2018-05-23 | End: 2018-05-23 | Stop reason: SDUPTHER

## 2018-05-23 RX ORDER — LIDOCAINE HYDROCHLORIDE AND EPINEPHRINE 10; 10 MG/ML; UG/ML
INJECTION, SOLUTION INFILTRATION; PERINEURAL PRN
Status: DISCONTINUED | OUTPATIENT
Start: 2018-05-23 | End: 2018-05-23 | Stop reason: HOSPADM

## 2018-05-23 RX ORDER — SUCCINYLCHOLINE/SOD CL,ISO/PF 100 MG/5ML
SYRINGE (ML) INTRAVENOUS PRN
Status: DISCONTINUED | OUTPATIENT
Start: 2018-05-23 | End: 2018-05-23 | Stop reason: SDUPTHER

## 2018-05-23 RX ORDER — SODIUM CHLORIDE 0.9 % (FLUSH) 0.9 %
10 SYRINGE (ML) INJECTION PRN
Status: DISCONTINUED | OUTPATIENT
Start: 2018-05-23 | End: 2018-05-23 | Stop reason: HOSPADM

## 2018-05-23 RX ORDER — OXYCODONE HYDROCHLORIDE AND ACETAMINOPHEN 5; 325 MG/1; MG/1
2 TABLET ORAL EVERY 4 HOURS PRN
Status: DISCONTINUED | OUTPATIENT
Start: 2018-05-23 | End: 2018-05-24 | Stop reason: HOSPADM

## 2018-05-23 RX ORDER — DOCUSATE SODIUM 100 MG/1
100 CAPSULE, LIQUID FILLED ORAL 2 TIMES DAILY
Status: DISCONTINUED | OUTPATIENT
Start: 2018-05-23 | End: 2018-05-24 | Stop reason: HOSPADM

## 2018-05-23 RX ORDER — LIDOCAINE HYDROCHLORIDE 20 MG/ML
INJECTION, SOLUTION INFILTRATION; PERINEURAL PRN
Status: DISCONTINUED | OUTPATIENT
Start: 2018-05-23 | End: 2018-05-23 | Stop reason: SDUPTHER

## 2018-05-23 RX ORDER — ONDANSETRON 2 MG/ML
4 INJECTION INTRAMUSCULAR; INTRAVENOUS EVERY 6 HOURS PRN
Status: DISCONTINUED | OUTPATIENT
Start: 2018-05-23 | End: 2018-05-24 | Stop reason: HOSPADM

## 2018-05-23 RX ORDER — DEXAMETHASONE SODIUM PHOSPHATE 10 MG/ML
INJECTION INTRAMUSCULAR; INTRAVENOUS PRN
Status: DISCONTINUED | OUTPATIENT
Start: 2018-05-23 | End: 2018-05-23 | Stop reason: SDUPTHER

## 2018-05-23 RX ORDER — FENTANYL CITRATE 50 UG/ML
50 INJECTION, SOLUTION INTRAMUSCULAR; INTRAVENOUS EVERY 10 MIN PRN
Status: DISCONTINUED | OUTPATIENT
Start: 2018-05-23 | End: 2018-05-23 | Stop reason: HOSPADM

## 2018-05-23 RX ORDER — SODIUM CHLORIDE 9 MG/ML
INJECTION, SOLUTION INTRAVENOUS CONTINUOUS
Status: DISCONTINUED | OUTPATIENT
Start: 2018-05-23 | End: 2018-05-24 | Stop reason: HOSPADM

## 2018-05-23 RX ADMIN — LIDOCAINE HYDROCHLORIDE 60 MG: 20 INJECTION, SOLUTION INFILTRATION; PERINEURAL at 07:38

## 2018-05-23 RX ADMIN — DOCUSATE SODIUM 100 MG: 100 CAPSULE, LIQUID FILLED ORAL at 21:50

## 2018-05-23 RX ADMIN — HYDROMORPHONE HYDROCHLORIDE 0.5 MG: 1 INJECTION, SOLUTION INTRAMUSCULAR; INTRAVENOUS; SUBCUTANEOUS at 12:00

## 2018-05-23 RX ADMIN — SODIUM CHLORIDE, POTASSIUM CHLORIDE, SODIUM LACTATE AND CALCIUM CHLORIDE: 600; 310; 30; 20 INJECTION, SOLUTION INTRAVENOUS at 10:19

## 2018-05-23 RX ADMIN — HYDROMORPHONE HYDROCHLORIDE 0.5 MG: 1 INJECTION, SOLUTION INTRAMUSCULAR; INTRAVENOUS; SUBCUTANEOUS at 11:50

## 2018-05-23 RX ADMIN — SODIUM CHLORIDE, POTASSIUM CHLORIDE, SODIUM LACTATE AND CALCIUM CHLORIDE 1 ML/HR: 600; 310; 30; 20 INJECTION, SOLUTION INTRAVENOUS at 06:48

## 2018-05-23 RX ADMIN — Medication 10 ML: at 21:50

## 2018-05-23 RX ADMIN — SUGAMMADEX 200 MG: 100 INJECTION, SOLUTION INTRAVENOUS at 10:45

## 2018-05-23 RX ADMIN — SODIUM CHLORIDE: 9 INJECTION, SOLUTION INTRAVENOUS at 15:25

## 2018-05-23 RX ADMIN — ROCURONIUM BROMIDE 5 MG: 10 INJECTION INTRAVENOUS at 07:38

## 2018-05-23 RX ADMIN — FENTANYL CITRATE 50 MCG: 50 INJECTION INTRAMUSCULAR; INTRAVENOUS at 13:00

## 2018-05-23 RX ADMIN — PHENYLEPHRINE HYDROCHLORIDE 100 MCG: 10 INJECTION INTRAVENOUS at 09:32

## 2018-05-23 RX ADMIN — PROPOFOL 200 MG: 10 INJECTION, EMULSION INTRAVENOUS at 07:38

## 2018-05-23 RX ADMIN — PHENYLEPHRINE HYDROCHLORIDE 100 MCG: 10 INJECTION INTRAVENOUS at 09:09

## 2018-05-23 RX ADMIN — OXYCODONE HYDROCHLORIDE AND ACETAMINOPHEN 2 TABLET: 5; 325 TABLET ORAL at 22:09

## 2018-05-23 RX ADMIN — PHENYLEPHRINE HYDROCHLORIDE 100 MCG: 10 INJECTION INTRAVENOUS at 09:17

## 2018-05-23 RX ADMIN — FENTANYL CITRATE 50 MCG: 50 INJECTION, SOLUTION INTRAMUSCULAR; INTRAVENOUS at 07:38

## 2018-05-23 RX ADMIN — ALBUTEROL SULFATE 2 PUFF: 90 AEROSOL, METERED RESPIRATORY (INHALATION) at 11:14

## 2018-05-23 RX ADMIN — Medication 2 G: at 07:30

## 2018-05-23 RX ADMIN — ONDANSETRON 4 MG: 2 INJECTION INTRAMUSCULAR; INTRAVENOUS at 10:11

## 2018-05-23 RX ADMIN — FENTANYL CITRATE 50 MCG: 50 INJECTION INTRAMUSCULAR; INTRAVENOUS at 12:27

## 2018-05-23 RX ADMIN — PHENYLEPHRINE HYDROCHLORIDE 100 MCG: 10 INJECTION INTRAVENOUS at 09:37

## 2018-05-23 RX ADMIN — LIDOCAINE HYDROCHLORIDE 0.1 ML: 10 INJECTION, SOLUTION EPIDURAL; INFILTRATION; INTRACAUDAL; PERINEURAL at 06:48

## 2018-05-23 RX ADMIN — SODIUM CHLORIDE, POTASSIUM CHLORIDE, SODIUM LACTATE AND CALCIUM CHLORIDE: 600; 310; 30; 20 INJECTION, SOLUTION INTRAVENOUS at 09:03

## 2018-05-23 RX ADMIN — PHENYLEPHRINE HYDROCHLORIDE 100 MCG: 10 INJECTION INTRAVENOUS at 09:48

## 2018-05-23 RX ADMIN — PHENYLEPHRINE HYDROCHLORIDE 100 MCG: 10 INJECTION INTRAVENOUS at 09:42

## 2018-05-23 RX ADMIN — Medication 1 G: at 08:28

## 2018-05-23 RX ADMIN — PROPOFOL 120 MCG/KG/MIN: 10 INJECTION, EMULSION INTRAVENOUS at 07:43

## 2018-05-23 RX ADMIN — MIDAZOLAM HYDROCHLORIDE 2 MG: 1 INJECTION, SOLUTION INTRAMUSCULAR; INTRAVENOUS at 07:29

## 2018-05-23 RX ADMIN — Medication 100 MG: at 07:38

## 2018-05-23 RX ADMIN — PHENYLEPHRINE HYDROCHLORIDE 100 MCG: 10 INJECTION INTRAVENOUS at 09:21

## 2018-05-23 RX ADMIN — PHENYLEPHRINE HYDROCHLORIDE 100 MCG: 10 INJECTION INTRAVENOUS at 09:14

## 2018-05-23 RX ADMIN — SUFENTANIL CITRATE 0.5 MCG/KG/HR: 0.05 INJECTION EPIDURAL; INTRAVENOUS at 08:03

## 2018-05-23 RX ADMIN — ROCURONIUM BROMIDE 5 MG: 10 INJECTION INTRAVENOUS at 07:42

## 2018-05-23 RX ADMIN — Medication 2 G: at 15:39

## 2018-05-23 RX ADMIN — SUGAMMADEX 200 MG: 100 INJECTION, SOLUTION INTRAVENOUS at 11:17

## 2018-05-23 RX ADMIN — DEXAMETHASONE SODIUM PHOSPHATE 10 MG: 10 INJECTION INTRAMUSCULAR; INTRAVENOUS at 08:14

## 2018-05-23 RX ADMIN — OXYCODONE HYDROCHLORIDE AND ACETAMINOPHEN 2 TABLET: 5; 325 TABLET ORAL at 16:48

## 2018-05-23 RX ADMIN — ROCURONIUM BROMIDE 10 MG: 10 INJECTION INTRAVENOUS at 09:00

## 2018-05-23 ASSESSMENT — PULMONARY FUNCTION TESTS
PIF_VALUE: 1
PIF_VALUE: 17
PIF_VALUE: 18
PIF_VALUE: 18
PIF_VALUE: 7
PIF_VALUE: 18
PIF_VALUE: 18
PIF_VALUE: 17
PIF_VALUE: 20
PIF_VALUE: 18
PIF_VALUE: 18
PIF_VALUE: 17
PIF_VALUE: 18
PIF_VALUE: 6
PIF_VALUE: 18
PIF_VALUE: 18
PIF_VALUE: 4
PIF_VALUE: 18
PIF_VALUE: 17
PIF_VALUE: 18
PIF_VALUE: 20
PIF_VALUE: 17
PIF_VALUE: 18
PIF_VALUE: 4
PIF_VALUE: 18
PIF_VALUE: 18
PIF_VALUE: 21
PIF_VALUE: 18
PIF_VALUE: 20
PIF_VALUE: 18
PIF_VALUE: 18
PIF_VALUE: 19
PIF_VALUE: 18
PIF_VALUE: 4
PIF_VALUE: 18
PIF_VALUE: 24
PIF_VALUE: 1
PIF_VALUE: 17
PIF_VALUE: 20
PIF_VALUE: 18
PIF_VALUE: 4
PIF_VALUE: 17
PIF_VALUE: 17
PIF_VALUE: 1
PIF_VALUE: 18
PIF_VALUE: 0
PIF_VALUE: 18
PIF_VALUE: 20
PIF_VALUE: 5
PIF_VALUE: 18
PIF_VALUE: 20
PIF_VALUE: 18
PIF_VALUE: 19
PIF_VALUE: 17
PIF_VALUE: 41
PIF_VALUE: 18
PIF_VALUE: 12
PIF_VALUE: 18
PIF_VALUE: 11
PIF_VALUE: 18
PIF_VALUE: 24
PIF_VALUE: 18
PIF_VALUE: 18
PIF_VALUE: 26
PIF_VALUE: 18
PIF_VALUE: 17
PIF_VALUE: 19
PIF_VALUE: 18
PIF_VALUE: 5
PIF_VALUE: 18
PIF_VALUE: 19
PIF_VALUE: 18
PIF_VALUE: 18
PIF_VALUE: 17
PIF_VALUE: 18
PIF_VALUE: 19
PIF_VALUE: 18
PIF_VALUE: 17
PIF_VALUE: 18
PIF_VALUE: 18
PIF_VALUE: 19
PIF_VALUE: 19
PIF_VALUE: 0
PIF_VALUE: 18
PIF_VALUE: 17
PIF_VALUE: 18
PIF_VALUE: 24
PIF_VALUE: 18
PIF_VALUE: 17
PIF_VALUE: 1
PIF_VALUE: 18
PIF_VALUE: 19
PIF_VALUE: 16
PIF_VALUE: 18
PIF_VALUE: 19
PIF_VALUE: 3
PIF_VALUE: 18
PIF_VALUE: 20
PIF_VALUE: 18
PIF_VALUE: 19
PIF_VALUE: 17
PIF_VALUE: 24
PIF_VALUE: 18
PIF_VALUE: 18
PIF_VALUE: 23
PIF_VALUE: 4
PIF_VALUE: 18
PIF_VALUE: 19
PIF_VALUE: 18
PIF_VALUE: 36
PIF_VALUE: 18
PIF_VALUE: 20
PIF_VALUE: 18
PIF_VALUE: 18
PIF_VALUE: 17
PIF_VALUE: 1
PIF_VALUE: 18
PIF_VALUE: 19
PIF_VALUE: 19
PIF_VALUE: 18
PIF_VALUE: 19
PIF_VALUE: 18
PIF_VALUE: 18
PIF_VALUE: 0
PIF_VALUE: 18
PIF_VALUE: 18
PIF_VALUE: 20
PIF_VALUE: 17
PIF_VALUE: 18
PIF_VALUE: 2
PIF_VALUE: 17
PIF_VALUE: 2
PIF_VALUE: 18
PIF_VALUE: 22
PIF_VALUE: 23
PIF_VALUE: 18
PIF_VALUE: 24
PIF_VALUE: 18
PIF_VALUE: 22
PIF_VALUE: 23
PIF_VALUE: 17
PIF_VALUE: 18
PIF_VALUE: 18
PIF_VALUE: 36
PIF_VALUE: 18
PIF_VALUE: 21
PIF_VALUE: 2
PIF_VALUE: 24
PIF_VALUE: 26
PIF_VALUE: 18
PIF_VALUE: 19
PIF_VALUE: 0
PIF_VALUE: 22
PIF_VALUE: 17
PIF_VALUE: 21
PIF_VALUE: 18
PIF_VALUE: 2
PIF_VALUE: 19
PIF_VALUE: 18
PIF_VALUE: 18
PIF_VALUE: 28
PIF_VALUE: 17
PIF_VALUE: 17
PIF_VALUE: 18
PIF_VALUE: 25
PIF_VALUE: 18
PIF_VALUE: 17
PIF_VALUE: 16
PIF_VALUE: 18
PIF_VALUE: 0
PIF_VALUE: 47
PIF_VALUE: 18
PIF_VALUE: 20
PIF_VALUE: 5
PIF_VALUE: 18
PIF_VALUE: 18
PIF_VALUE: 19

## 2018-05-23 ASSESSMENT — PAIN SCALES - GENERAL
PAINLEVEL_OUTOF10: 5
PAINLEVEL_OUTOF10: 6
PAINLEVEL_OUTOF10: 7
PAINLEVEL_OUTOF10: 8
PAINLEVEL_OUTOF10: 5
PAINLEVEL_OUTOF10: 8
PAINLEVEL_OUTOF10: 5
PAINLEVEL_OUTOF10: 8

## 2018-05-23 ASSESSMENT — PAIN DESCRIPTION - PROGRESSION
CLINICAL_PROGRESSION: GRADUALLY WORSENING

## 2018-05-23 ASSESSMENT — PAIN - FUNCTIONAL ASSESSMENT: PAIN_FUNCTIONAL_ASSESSMENT: 0-10

## 2018-05-23 ASSESSMENT — PAIN DESCRIPTION - DESCRIPTORS: DESCRIPTORS: BURNING;STABBING

## 2018-05-23 ASSESSMENT — ACTIVITIES OF DAILY LIVING (ADL): EFFECT OF PAIN ON DAILY ACTIVITIES: CANT WALK

## 2018-05-24 VITALS
HEIGHT: 66 IN | BODY MASS INDEX: 38.8 KG/M2 | TEMPERATURE: 97.3 F | DIASTOLIC BLOOD PRESSURE: 54 MMHG | WEIGHT: 241.4 LBS | OXYGEN SATURATION: 96 % | SYSTOLIC BLOOD PRESSURE: 119 MMHG | RESPIRATION RATE: 16 BRPM | HEART RATE: 85 BPM

## 2018-05-24 PROCEDURE — 6360000002 HC RX W HCPCS: Performed by: ORTHOPAEDIC SURGERY

## 2018-05-24 PROCEDURE — 2580000003 HC RX 258: Performed by: ORTHOPAEDIC SURGERY

## 2018-05-24 PROCEDURE — G8978 MOBILITY CURRENT STATUS: HCPCS

## 2018-05-24 PROCEDURE — 6370000000 HC RX 637 (ALT 250 FOR IP): Performed by: NURSE PRACTITIONER

## 2018-05-24 PROCEDURE — 97162 PT EVAL MOD COMPLEX 30 MIN: CPT

## 2018-05-24 PROCEDURE — G8979 MOBILITY GOAL STATUS: HCPCS

## 2018-05-24 PROCEDURE — 6370000000 HC RX 637 (ALT 250 FOR IP): Performed by: ORTHOPAEDIC SURGERY

## 2018-05-24 RX ORDER — CARVEDILOL 12.5 MG/1
12.5 TABLET ORAL 2 TIMES DAILY
Status: DISCONTINUED | OUTPATIENT
Start: 2018-05-24 | End: 2018-05-24 | Stop reason: HOSPADM

## 2018-05-24 RX ORDER — PSEUDOEPHEDRINE HCL 30 MG
100 TABLET ORAL 2 TIMES DAILY PRN
Qty: 60 CAPSULE | Refills: 0 | Status: SHIPPED | OUTPATIENT
Start: 2018-05-24 | End: 2018-06-23

## 2018-05-24 RX ORDER — DIAZEPAM 5 MG/1
5 TABLET ORAL EVERY 6 HOURS PRN
Qty: 20 TABLET | Refills: 0 | Status: SHIPPED | OUTPATIENT
Start: 2018-05-24 | End: 2018-05-31

## 2018-05-24 RX ORDER — GABAPENTIN 100 MG/1
200 CAPSULE ORAL 2 TIMES DAILY
Status: DISCONTINUED | OUTPATIENT
Start: 2018-05-24 | End: 2018-05-24 | Stop reason: HOSPADM

## 2018-05-24 RX ORDER — OXYCODONE HYDROCHLORIDE AND ACETAMINOPHEN 5; 325 MG/1; MG/1
1 TABLET ORAL EVERY 4 HOURS PRN
Qty: 40 TABLET | Refills: 0 | Status: SHIPPED | OUTPATIENT
Start: 2018-05-24 | End: 2018-05-31

## 2018-05-24 RX ORDER — CYCLOBENZAPRINE HCL 10 MG
10 TABLET ORAL 3 TIMES DAILY PRN
Status: DISCONTINUED | OUTPATIENT
Start: 2018-05-24 | End: 2018-05-24 | Stop reason: HOSPADM

## 2018-05-24 RX ADMIN — DOCUSATE SODIUM 100 MG: 100 CAPSULE, LIQUID FILLED ORAL at 09:27

## 2018-05-24 RX ADMIN — OXYCODONE HYDROCHLORIDE AND ACETAMINOPHEN 2 TABLET: 5; 325 TABLET ORAL at 02:00

## 2018-05-24 RX ADMIN — SODIUM CHLORIDE: 9 INJECTION, SOLUTION INTRAVENOUS at 00:51

## 2018-05-24 RX ADMIN — DIAZEPAM 5 MG: 5 TABLET ORAL at 01:18

## 2018-05-24 RX ADMIN — CYCLOBENZAPRINE HYDROCHLORIDE 10 MG: 10 TABLET, FILM COATED ORAL at 09:27

## 2018-05-24 RX ADMIN — GABAPENTIN 200 MG: 100 CAPSULE ORAL at 09:27

## 2018-05-24 RX ADMIN — OXYCODONE HYDROCHLORIDE AND ACETAMINOPHEN 1 TABLET: 5; 325 TABLET ORAL at 08:15

## 2018-05-24 RX ADMIN — Medication 2 G: at 00:51

## 2018-05-24 RX ADMIN — Medication 10 ML: at 09:27

## 2018-05-24 ASSESSMENT — PAIN SCALES - GENERAL
PAINLEVEL_OUTOF10: 8
PAINLEVEL_OUTOF10: 8
PAINLEVEL_OUTOF10: 6
PAINLEVEL_OUTOF10: 4
PAINLEVEL_OUTOF10: 10

## 2018-05-24 ASSESSMENT — PAIN DESCRIPTION - PROGRESSION
CLINICAL_PROGRESSION: GRADUALLY WORSENING
CLINICAL_PROGRESSION: GRADUALLY WORSENING

## 2018-05-24 ASSESSMENT — PAIN DESCRIPTION - LOCATION: LOCATION: BUTTOCKS

## 2018-05-24 ASSESSMENT — PAIN DESCRIPTION - FREQUENCY: FREQUENCY: CONTINUOUS

## 2018-05-24 ASSESSMENT — PAIN DESCRIPTION - ORIENTATION: ORIENTATION: LEFT

## 2018-05-24 ASSESSMENT — PAIN DESCRIPTION - DESCRIPTORS: DESCRIPTORS: CRAMPING

## 2018-05-24 ASSESSMENT — PAIN DESCRIPTION - PAIN TYPE: TYPE: CHRONIC PAIN

## 2018-05-25 ENCOUNTER — CARE COORDINATION (OUTPATIENT)
Dept: CASE MANAGEMENT | Age: 53
End: 2018-05-25

## 2018-05-25 DIAGNOSIS — M48.062 LUMBAR STENOSIS WITH NEUROGENIC CLAUDICATION: Primary | ICD-10-CM

## 2018-05-25 PROCEDURE — 1111F DSCHRG MED/CURRENT MED MERGE: CPT | Performed by: FAMILY MEDICINE

## 2018-05-29 ENCOUNTER — CARE COORDINATION (OUTPATIENT)
Dept: CASE MANAGEMENT | Age: 53
End: 2018-05-29

## 2018-06-07 ENCOUNTER — HOSPITAL ENCOUNTER (OUTPATIENT)
Dept: GENERAL RADIOLOGY | Age: 53
Discharge: HOME | End: 2018-06-09
Payer: COMMERCIAL

## 2018-06-07 DIAGNOSIS — M54.5 LOW BACK PAIN, UNSPECIFIED BACK PAIN LATERALITY, UNSPECIFIED CHRONICITY, WITH SCIATICA PRESENCE UNSPECIFIED: ICD-10-CM

## 2018-06-07 PROCEDURE — 72100 X-RAY EXAM L-S SPINE 2/3 VWS: CPT

## 2018-07-05 ENCOUNTER — HOSPITAL ENCOUNTER (OUTPATIENT)
Dept: GENERAL RADIOLOGY | Age: 53
Discharge: HOME | End: 2018-07-07
Payer: COMMERCIAL

## 2018-07-05 DIAGNOSIS — M54.5 LOW BACK PAIN, UNSPECIFIED BACK PAIN LATERALITY, UNSPECIFIED CHRONICITY, WITH SCIATICA PRESENCE UNSPECIFIED: ICD-10-CM

## 2018-07-05 PROCEDURE — 72100 X-RAY EXAM L-S SPINE 2/3 VWS: CPT

## 2018-08-06 DIAGNOSIS — M26.629 TMJ ARTHRALGIA: ICD-10-CM

## 2018-08-06 RX ORDER — CYCLOBENZAPRINE HCL 10 MG
10 TABLET ORAL 3 TIMES DAILY PRN
Qty: 60 TABLET | Refills: 3 | Status: SHIPPED | OUTPATIENT
Start: 2018-08-06

## 2018-08-07 ENCOUNTER — APPOINTMENT (OUTPATIENT)
Dept: GENERAL RADIOLOGY | Age: 53
End: 2018-08-07
Payer: COMMERCIAL

## 2018-08-07 ENCOUNTER — HOSPITAL ENCOUNTER (EMERGENCY)
Age: 53
Discharge: HOME | End: 2018-08-07
Attending: EMERGENCY MEDICINE
Payer: COMMERCIAL

## 2018-08-07 ENCOUNTER — NURSE TRIAGE (OUTPATIENT)
Dept: OTHER | Facility: CLINIC | Age: 53
End: 2018-08-07

## 2018-08-07 VITALS
WEIGHT: 246 LBS | SYSTOLIC BLOOD PRESSURE: 123 MMHG | DIASTOLIC BLOOD PRESSURE: 91 MMHG | TEMPERATURE: 98.4 F | OXYGEN SATURATION: 96 % | HEIGHT: 67 IN | RESPIRATION RATE: 18 BRPM | HEART RATE: 92 BPM | BODY MASS INDEX: 38.61 KG/M2

## 2018-08-07 DIAGNOSIS — M25.561 ACUTE PAIN OF RIGHT KNEE: Primary | ICD-10-CM

## 2018-08-07 PROCEDURE — 73562 X-RAY EXAM OF KNEE 3: CPT

## 2018-08-07 PROCEDURE — 6360000002 HC RX W HCPCS: Performed by: EMERGENCY MEDICINE

## 2018-08-07 PROCEDURE — 99283 EMERGENCY DEPT VISIT LOW MDM: CPT

## 2018-08-07 RX ORDER — ONDANSETRON 4 MG/1
4 TABLET, ORALLY DISINTEGRATING ORAL ONCE
Status: COMPLETED | OUTPATIENT
Start: 2018-08-07 | End: 2018-08-07

## 2018-08-07 RX ORDER — OXYCODONE HYDROCHLORIDE AND ACETAMINOPHEN 5; 325 MG/1; MG/1
1 TABLET ORAL EVERY 6 HOURS PRN
Qty: 28 TABLET | Refills: 0 | Status: SHIPPED | OUTPATIENT
Start: 2018-08-07 | End: 2018-08-14

## 2018-08-07 RX ADMIN — HYDROMORPHONE HYDROCHLORIDE 1 MG: 1 INJECTION, SOLUTION INTRAMUSCULAR; INTRAVENOUS; SUBCUTANEOUS at 14:28

## 2018-08-07 RX ADMIN — ONDANSETRON 4 MG: 4 TABLET, ORALLY DISINTEGRATING ORAL at 14:28

## 2018-08-07 ASSESSMENT — ENCOUNTER SYMPTOMS
SHORTNESS OF BREATH: 0
ABDOMINAL PAIN: 0
COUGH: 0
SORE THROAT: 0
DIARRHEA: 0
VOMITING: 0
BACK PAIN: 0
NAUSEA: 0

## 2018-08-07 ASSESSMENT — PAIN SCALES - GENERAL
PAINLEVEL_OUTOF10: 6
PAINLEVEL_OUTOF10: 6

## 2018-08-07 ASSESSMENT — PAIN DESCRIPTION - FREQUENCY: FREQUENCY: CONTINUOUS

## 2018-08-07 ASSESSMENT — PAIN DESCRIPTION - DESCRIPTORS: DESCRIPTORS: CONSTANT;THROBBING;STABBING

## 2018-08-07 ASSESSMENT — PAIN DESCRIPTION - ORIENTATION: ORIENTATION: RIGHT

## 2018-08-07 ASSESSMENT — PAIN DESCRIPTION - PROGRESSION: CLINICAL_PROGRESSION: GRADUALLY WORSENING

## 2018-08-07 ASSESSMENT — PAIN DESCRIPTION - LOCATION: LOCATION: KNEE

## 2018-08-07 NOTE — ED NOTES
Ace wrap applied to patient right knee at this time. Patient educated on proper application of ace wrap. Patient discharge instructions reviewed with patient by Pablito Jerez RN at this time. Patient verbalized understanding of the instructions reviewed with her. Patient in no distress at this time. Patient taken out to [facility] via wheelchair at this time to await family for . Patient has taken all belongings with her at this time including personal walker.       Cecille Meneses RN  08/07/18 [id number]

## 2018-08-20 ENCOUNTER — HOSPITAL ENCOUNTER (OUTPATIENT)
Dept: ULTRASOUND IMAGING | Age: 53
Discharge: HOME | End: 2018-08-22
Payer: COMMERCIAL

## 2018-08-20 DIAGNOSIS — M79.89 PAIN AND SWELLING OF RIGHT LOWER LEG: ICD-10-CM

## 2018-08-20 DIAGNOSIS — M79.661 PAIN AND SWELLING OF RIGHT LOWER LEG: ICD-10-CM

## 2018-08-20 PROCEDURE — 93971 EXTREMITY STUDY: CPT

## 2018-08-30 ENCOUNTER — HOSPITAL ENCOUNTER (OUTPATIENT)
Dept: GENERAL RADIOLOGY | Age: 53
Discharge: HOME | End: 2018-09-01
Payer: COMMERCIAL

## 2018-08-30 DIAGNOSIS — M54.5 LOW BACK PAIN, UNSPECIFIED BACK PAIN LATERALITY, UNSPECIFIED CHRONICITY, WITH SCIATICA PRESENCE UNSPECIFIED: ICD-10-CM

## 2018-08-30 PROCEDURE — 72100 X-RAY EXAM L-S SPINE 2/3 VWS: CPT

## 2018-09-03 NOTE — CARE COORDINATION
Attempted to contact patient for ED follow up. Pt was in the ED on 10/15/17 at [facility] in home, Right arm pain, Acute pain bilateral knees. Called Pt to Schedule an ED F/U Appt with Nancy Acosta,  , Review Discharge Instructions, and Reconcile Medications. Unable to reach patient by phone. Message left regarding the purpose of this call. Number provided and call back requested. Patient given pain medication, see MAR 
 
07:37 Medication Given butorphanol (STADOL) injection 1 mg -  Dose: 1 mg ; Route: IntraVENous ; Line: Peripheral IV 09/02/18 Left Wrist Catrachita Johnson RN  
 
 
 
 09/03/18 7905 Pain 1 Pain Scale 1 Numeric (0 - 10) Pain Intensity 1 4 Pain Location 1 Abdomen;Back Pain Description 1 Cramping Pain Intervention(s) 1 Medication (see MAR)

## 2018-09-05 ENCOUNTER — OFFICE VISIT (OUTPATIENT)
Dept: PRIMARY CARE CLINIC | Age: 53
End: 2018-09-05
Payer: COMMERCIAL

## 2018-09-05 VITALS
DIASTOLIC BLOOD PRESSURE: 88 MMHG | TEMPERATURE: 98.3 F | HEIGHT: 67 IN | BODY MASS INDEX: 38.42 KG/M2 | WEIGHT: 244.8 LBS | HEART RATE: 80 BPM | RESPIRATION RATE: 16 BRPM | SYSTOLIC BLOOD PRESSURE: 116 MMHG

## 2018-09-05 DIAGNOSIS — M25.562 CHRONIC PAIN OF BOTH KNEES: ICD-10-CM

## 2018-09-05 DIAGNOSIS — Z01.818 PRE-OP EXAMINATION: ICD-10-CM

## 2018-09-05 DIAGNOSIS — N39.0 URINARY TRACT INFECTION WITHOUT HEMATURIA, SITE UNSPECIFIED: ICD-10-CM

## 2018-09-05 DIAGNOSIS — D68.9 COAGULOPATHY (HCC): ICD-10-CM

## 2018-09-05 DIAGNOSIS — M25.561 CHRONIC PAIN OF BOTH KNEES: ICD-10-CM

## 2018-09-05 DIAGNOSIS — R30.0 DYSURIA: ICD-10-CM

## 2018-09-05 DIAGNOSIS — G89.29 CHRONIC PAIN OF BOTH KNEES: ICD-10-CM

## 2018-09-05 DIAGNOSIS — R82.998 LEUKOCYTES IN URINE: ICD-10-CM

## 2018-09-05 DIAGNOSIS — I10 ESSENTIAL HYPERTENSION: ICD-10-CM

## 2018-09-05 DIAGNOSIS — M48.062 LUMBAR STENOSIS WITH NEUROGENIC CLAUDICATION: ICD-10-CM

## 2018-09-05 DIAGNOSIS — Z12.39 BREAST CANCER SCREENING: ICD-10-CM

## 2018-09-05 DIAGNOSIS — Z01.818 PRE-OP EXAMINATION: Primary | ICD-10-CM

## 2018-09-05 LAB
ALBUMIN SERPL-MCNC: 4.1 G/DL (ref 3.9–4.9)
ALP BLD-CCNC: 124 U/L (ref 40–130)
ALT SERPL-CCNC: 17 U/L (ref 0–33)
ANION GAP SERPL CALCULATED.3IONS-SCNC: 16 MEQ/L (ref 7–13)
APTT: 26.6 SEC (ref 21.6–35.4)
AST SERPL-CCNC: 19 U/L (ref 0–35)
BASOPHILS ABSOLUTE: 0.1 K/UL (ref 0–0.2)
BASOPHILS RELATIVE PERCENT: 1 %
BILIRUB SERPL-MCNC: 0.5 MG/DL (ref 0–1.2)
BILIRUBIN, POC: ABNORMAL
BLOOD URINE, POC: ABNORMAL
BUN BLDV-MCNC: 21 MG/DL (ref 6–20)
CALCIUM SERPL-MCNC: 9.6 MG/DL (ref 8.6–10.2)
CHLORIDE BLD-SCNC: 103 MEQ/L (ref 98–107)
CLARITY, POC: ABNORMAL
CO2: 22 MEQ/L (ref 22–29)
COLOR, POC: YELLOW
CREAT SERPL-MCNC: 0.53 MG/DL (ref 0.5–0.9)
EOSINOPHILS ABSOLUTE: 0.3 K/UL (ref 0–0.7)
EOSINOPHILS RELATIVE PERCENT: 4.4 %
GFR AFRICAN AMERICAN: >60
GFR NON-AFRICAN AMERICAN: >60
GLOBULIN: 3.3 G/DL (ref 2.3–3.5)
GLUCOSE BLD-MCNC: 91 MG/DL (ref 74–109)
GLUCOSE URINE, POC: ABNORMAL
HCT VFR BLD CALC: 45.4 % (ref 37–47)
HEMOGLOBIN: 15.3 G/DL (ref 12–16)
INR BLD: 1
KETONES, POC: ABNORMAL
LEUKOCYTE EST, POC: ABNORMAL
LYMPHOCYTES ABSOLUTE: 1.7 K/UL (ref 1–4.8)
LYMPHOCYTES RELATIVE PERCENT: 22.4 %
MCH RBC QN AUTO: 29.1 PG (ref 27–31.3)
MCHC RBC AUTO-ENTMCNC: 33.7 % (ref 33–37)
MCV RBC AUTO: 86.5 FL (ref 82–100)
MONOCYTES ABSOLUTE: 0.5 K/UL (ref 0.2–0.8)
MONOCYTES RELATIVE PERCENT: 6.7 %
NEUTROPHILS ABSOLUTE: 5 K/UL (ref 1.4–6.5)
NEUTROPHILS RELATIVE PERCENT: 65.5 %
NITRITE, POC: ABNORMAL
PDW BLD-RTO: 14.4 % (ref 11.5–14.5)
PH, POC: 5.5
PLATELET # BLD: 213 K/UL (ref 130–400)
POTASSIUM SERPL-SCNC: 4.4 MEQ/L (ref 3.5–5.1)
PROTEIN, POC: ABNORMAL
PROTHROMBIN TIME: 10.1 SEC (ref 9.6–12.3)
RBC # BLD: 5.24 M/UL (ref 4.2–5.4)
SODIUM BLD-SCNC: 141 MEQ/L (ref 132–144)
SPECIFIC GRAVITY, POC: 1.02
TOTAL PROTEIN: 7.4 G/DL (ref 6.4–8.1)
UROBILINOGEN, POC: ABNORMAL
WBC # BLD: 7.6 K/UL (ref 4.8–10.8)

## 2018-09-05 PROCEDURE — 99214 OFFICE O/P EST MOD 30 MIN: CPT | Performed by: FAMILY MEDICINE

## 2018-09-05 PROCEDURE — 81002 URINALYSIS NONAUTO W/O SCOPE: CPT | Performed by: FAMILY MEDICINE

## 2018-09-05 PROCEDURE — 93000 ELECTROCARDIOGRAM COMPLETE: CPT | Performed by: FAMILY MEDICINE

## 2018-09-05 RX ORDER — OXYCODONE HYDROCHLORIDE AND ACETAMINOPHEN 5; 325 MG/1; MG/1
1 TABLET ORAL EVERY 6 HOURS PRN
Status: ON HOLD | COMMUNITY
End: 2018-09-29 | Stop reason: HOSPADM

## 2018-09-05 RX ORDER — HYDROCODONE BITARTRATE AND ACETAMINOPHEN 5; 325 MG/1; MG/1
1 TABLET ORAL EVERY 6 HOURS PRN
Status: ON HOLD | COMMUNITY
End: 2018-09-29 | Stop reason: HOSPADM

## 2018-09-05 RX ORDER — CIPROFLOXACIN 500 MG/1
500 TABLET, FILM COATED ORAL 2 TIMES DAILY
Qty: 10 TABLET | Refills: 1 | Status: SHIPPED | OUTPATIENT
Start: 2018-09-05 | End: 2018-09-10

## 2018-09-05 ASSESSMENT — ENCOUNTER SYMPTOMS
APNEA: 0
CONSTIPATION: 0
PHOTOPHOBIA: 0
NAUSEA: 0
EYE REDNESS: 0
DIARRHEA: 0
WHEEZING: 0
CHOKING: 0
STRIDOR: 0
EYE DISCHARGE: 0
FACIAL SWELLING: 0
ABDOMINAL PAIN: 0
CHEST TIGHTNESS: 0
EYE PAIN: 0
COLOR CHANGE: 0

## 2018-09-05 NOTE — PROGRESS NOTES
Subjective:      Patient ID: Jeff Hardy is a 47 y.o. female who presents today for:  Chief Complaint   Patient presents with    Pre-op Exam     PAT for a Right Total Knee Replacement 9/28/18 with Dr. Noreen Huffman at [facility].  Health Maintenance     Declines colonoscopy & FIT test.       HPI  Pre-op Exam  Patient is here today for pre-op exam for Right Total Knee Replacement on 09/28/18 with Dr. Noreen Huffman at [facility]. She admits to slight increased urination but denies any other symptoms. Her urine today does show Leukocytes. Past Medical History:   Diagnosis Date    Allergic rhinitis 3/8/2016    Chronic bilateral low back pain without sciatica 12/19/2016    Diverticulosis 8/27/2013    Drug addiction, Pts. daughter 12/19/2016    Hypertension     meds > 8 yrs    Localized edema 8/30/2016    Osteoarthritis     Osteoarthritis of spine with radiculopathy, lumbar region, [place] 6/27/2017    Postprandial diarrhea 8/27/2013    Renal stones     S/P cholecystectomy, 2008 8/27/2013    Screen for colon cancer 8/30/2016    Seasonal allergies 8/11/2015    Status post tooth extraction, upper right side, caused dental pain 1/15/2018    Stress 8/27/2013     Past Surgical History:   Procedure Laterality Date   [address]    CHOLECYSTECTOMY      2009    COLONOSCOPY      EYE SURGERY      [place] with IOL OU    FOOT SURGERY      Left foot 1999    HERNIA REPAIR  9242    umbilical repair    HYSTERECTOMY  2006    LITHOTRIPSY      has had several / last 2014    LA LUMB SP FUSN,POST INTERBDY,EA ADDNL N/A 5/23/2018    SPINE L 4-5, L 5-S1 LAMINECTOMY, L 4-5 INSTRUMENTED POSTERIOR INTERBODY FUSION AND POSTERIOR LATERAL FUSION, PRONE, AXIS SPINE TABLE, ACTIFUSE, NEW MICROSCOPE, NEW C-ARM X2, PEDIGUARD, SPINAL CORD MONITORING, LUMBAR BRACE.  GLOBUS: MIS TLIF performed by Yash Pizarro MD at [address] History   Problem Relation Age of Onset    High Blood Pressure Mother     Other Mother         Hypothyrodism    Cancer Father         prostate    Arthritis Father     Arthritis Brother     Other Brother         hypoglycemia    Diabetes Maternal Grandmother     Depression Paternal Grandfather     Diabetes Paternal Grandmother     No Known Problems Daughter      Social History     Social History    Marital status: Single     Spouse name: N/A    Number of children: N/A    Years of education: N/A     Occupational History    Not on file. Social History Main Topics    Smoking status: Never Smoker    Smokeless tobacco: Never Used    Alcohol use No    Drug use: No    Sexual activity: Not Currently     Other Topics Concern    Not on file     Social History Narrative    No narrative on file     Allergies:  Morphine; Celebrex [celecoxib]; and Medrol [methylprednisolone]    Review of Systems   Constitutional: Negative for activity change, appetite change, chills, diaphoresis and fever. HENT: Negative for congestion, ear discharge, ear pain, facial swelling, hearing loss and mouth sores. Eyes: Negative for photophobia, pain, discharge and redness. Respiratory: Negative for apnea, choking, chest tightness, wheezing and stridor. Cardiovascular: Negative for chest pain, palpitations and leg swelling. Gastrointestinal: Negative for abdominal pain, constipation, diarrhea and nausea. Endocrine: Negative for cold intolerance, heat intolerance, polydipsia and polyphagia. Genitourinary: Positive for frequency. Negative for dysuria. Musculoskeletal: Negative for gait problem, joint swelling, neck pain and neck stiffness. Skin: Negative for color change, pallor, rash and wound. Allergic/Immunologic: Negative for immunocompromised state. Neurological: Negative for tremors, syncope, facial asymmetry, speech difficulty and headaches. Psychiatric/Behavioral: Negative for confusion and hallucinations.  The patient is not nervous/anxious and is not

## 2018-09-06 LAB — MRSA CULTURE ONLY: NORMAL

## 2018-09-07 LAB — URINE CULTURE, ROUTINE: NORMAL

## 2018-09-17 ENCOUNTER — NURSE ONLY (OUTPATIENT)
Dept: PRIMARY CARE CLINIC | Age: 53
End: 2018-09-17
Payer: COMMERCIAL

## 2018-09-17 DIAGNOSIS — R30.0 DYSURIA: Primary | ICD-10-CM

## 2018-09-17 LAB
BILIRUBIN, POC: NORMAL
BLOOD URINE, POC: NORMAL
CLARITY, POC: CLEAR
COLOR, POC: NORMAL
GLUCOSE URINE, POC: NORMAL
KETONES, POC: NORMAL
LEUKOCYTE EST, POC: NORMAL
NITRITE, POC: NORMAL
PH, POC: 6
PROTEIN, POC: NORMAL
SPECIFIC GRAVITY, POC: 1.02
UROBILINOGEN, POC: 3.5

## 2018-09-17 PROCEDURE — 81002 URINALYSIS NONAUTO W/O SCOPE: CPT | Performed by: FAMILY MEDICINE

## 2018-09-20 ENCOUNTER — HOSPITAL ENCOUNTER (OUTPATIENT)
Dept: PREADMISSION TESTING | Age: 53
Discharge: HOME | End: 2018-09-24
Payer: COMMERCIAL

## 2018-09-20 VITALS
RESPIRATION RATE: 18 BRPM | SYSTOLIC BLOOD PRESSURE: 145 MMHG | WEIGHT: 242.4 LBS | HEART RATE: 84 BPM | HEIGHT: 65 IN | OXYGEN SATURATION: 98 % | TEMPERATURE: 97.2 F | BODY MASS INDEX: 40.39 KG/M2 | DIASTOLIC BLOOD PRESSURE: 74 MMHG

## 2018-09-20 PROBLEM — M17.11 OSTEOARTHRITIS OF RIGHT KNEE: Status: ACTIVE | Noted: 2018-09-20

## 2018-09-20 LAB
ABO/RH: NORMAL
ANTIBODY SCREEN: NORMAL

## 2018-09-20 PROCEDURE — 86900 BLOOD TYPING SEROLOGIC ABO: CPT

## 2018-09-20 PROCEDURE — 86850 RBC ANTIBODY SCREEN: CPT

## 2018-09-20 PROCEDURE — 86901 BLOOD TYPING SEROLOGIC RH(D): CPT

## 2018-09-20 RX ORDER — SODIUM CHLORIDE 0.9 % (FLUSH) 0.9 %
10 SYRINGE (ML) INJECTION PRN
Status: CANCELLED | OUTPATIENT
Start: 2018-09-20

## 2018-09-20 RX ORDER — LIDOCAINE HYDROCHLORIDE 10 MG/ML
1 INJECTION, SOLUTION EPIDURAL; INFILTRATION; INTRACAUDAL; PERINEURAL
Status: CANCELLED | OUTPATIENT
Start: 2018-09-20 | End: 2018-09-20

## 2018-09-20 RX ORDER — LORATADINE 10 MG/1
10 CAPSULE, LIQUID FILLED ORAL DAILY
COMMUNITY

## 2018-09-20 RX ORDER — SODIUM CHLORIDE, SODIUM LACTATE, POTASSIUM CHLORIDE, CALCIUM CHLORIDE 600; 310; 30; 20 MG/100ML; MG/100ML; MG/100ML; MG/100ML
INJECTION, SOLUTION INTRAVENOUS CONTINUOUS
Status: CANCELLED | OUTPATIENT
Start: 2018-09-20

## 2018-09-20 RX ORDER — SODIUM CHLORIDE 0.9 % (FLUSH) 0.9 %
10 SYRINGE (ML) INJECTION EVERY 12 HOURS SCHEDULED
Status: CANCELLED | OUTPATIENT
Start: 2018-09-20

## 2018-09-26 NOTE — TELEPHONE ENCOUNTER
Patient was last seen on 9-5-18  Last Prescribed 5-21-18    Medication is pending  Please approve or deny this request

## 2018-09-27 RX ORDER — GABAPENTIN 100 MG/1
CAPSULE ORAL
Qty: 120 CAPSULE | Refills: 0 | Status: SHIPPED | OUTPATIENT
Start: 2018-09-27 | End: 2018-10-01 | Stop reason: SDUPTHER

## 2018-09-28 ENCOUNTER — ANESTHESIA (OUTPATIENT)
Dept: OPERATING ROOM | Age: 53
DRG: 470 | End: 2018-09-28
Payer: COMMERCIAL

## 2018-09-28 ENCOUNTER — HOSPITAL ENCOUNTER (INPATIENT)
Age: 53
LOS: 2 days | Discharge: HOME HEALTH | DRG: 470 | End: 2018-09-30
Attending: ORTHOPAEDIC SURGERY | Admitting: ORTHOPAEDIC SURGERY
Payer: COMMERCIAL

## 2018-09-28 ENCOUNTER — APPOINTMENT (OUTPATIENT)
Dept: GENERAL RADIOLOGY | Age: 53
DRG: 470 | End: 2018-09-28
Attending: ORTHOPAEDIC SURGERY
Payer: COMMERCIAL

## 2018-09-28 ENCOUNTER — ANESTHESIA EVENT (OUTPATIENT)
Dept: OPERATING ROOM | Age: 53
DRG: 470 | End: 2018-09-28
Payer: COMMERCIAL

## 2018-09-28 VITALS — SYSTOLIC BLOOD PRESSURE: 110 MMHG | OXYGEN SATURATION: 96 % | TEMPERATURE: 98.6 F | DIASTOLIC BLOOD PRESSURE: 61 MMHG

## 2018-09-28 DIAGNOSIS — Z96.651 STATUS POST TOTAL KNEE REPLACEMENT, RIGHT: Primary | ICD-10-CM

## 2018-09-28 PROCEDURE — G0378 HOSPITAL OBSERVATION PER HR: HCPCS

## 2018-09-28 PROCEDURE — 3E0T3BZ INTRODUCTION OF ANESTHETIC AGENT INTO PERIPHERAL NERVES AND PLEXI, PERCUTANEOUS APPROACH: ICD-10-PCS | Performed by: ANESTHESIOLOGY

## 2018-09-28 PROCEDURE — 2580000003 HC RX 258: Performed by: NURSE PRACTITIONER

## 2018-09-28 PROCEDURE — 1210000000 HC MED SURG R&B

## 2018-09-28 PROCEDURE — 97167 OT EVAL HIGH COMPLEX 60 MIN: CPT

## 2018-09-28 PROCEDURE — 97162 PT EVAL MOD COMPLEX 30 MIN: CPT

## 2018-09-28 PROCEDURE — 94150 VITAL CAPACITY TEST: CPT

## 2018-09-28 PROCEDURE — 2580000003 HC RX 258: Performed by: NURSE ANESTHETIST, CERTIFIED REGISTERED

## 2018-09-28 PROCEDURE — 97110 THERAPEUTIC EXERCISES: CPT

## 2018-09-28 PROCEDURE — 3600000014 HC SURGERY LEVEL 4 ADDTL 15MIN: Performed by: ORTHOPAEDIC SURGERY

## 2018-09-28 PROCEDURE — 2580000003 HC RX 258: Performed by: ORTHOPAEDIC SURGERY

## 2018-09-28 PROCEDURE — 2500000003 HC RX 250 WO HCPCS: Performed by: NURSE ANESTHETIST, CERTIFIED REGISTERED

## 2018-09-28 PROCEDURE — 6360000002 HC RX W HCPCS: Performed by: NURSE ANESTHETIST, CERTIFIED REGISTERED

## 2018-09-28 PROCEDURE — L1830 KO IMMOB CANVAS LONG PRE OTS: HCPCS | Performed by: ORTHOPAEDIC SURGERY

## 2018-09-28 PROCEDURE — 6360000002 HC RX W HCPCS: Performed by: ANESTHESIOLOGY

## 2018-09-28 PROCEDURE — 97535 SELF CARE MNGMENT TRAINING: CPT

## 2018-09-28 PROCEDURE — 2500000003 HC RX 250 WO HCPCS: Performed by: NURSE PRACTITIONER

## 2018-09-28 PROCEDURE — C1713 ANCHOR/SCREW BN/BN,TIS/BN: HCPCS | Performed by: ORTHOPAEDIC SURGERY

## 2018-09-28 PROCEDURE — 7100000000 HC PACU RECOVERY - FIRST 15 MIN: Performed by: ORTHOPAEDIC SURGERY

## 2018-09-28 PROCEDURE — 73560 X-RAY EXAM OF KNEE 1 OR 2: CPT

## 2018-09-28 PROCEDURE — 6370000000 HC RX 637 (ALT 250 FOR IP): Performed by: ORTHOPAEDIC SURGERY

## 2018-09-28 PROCEDURE — 2709999900 HC NON-CHARGEABLE SUPPLY: Performed by: ORTHOPAEDIC SURGERY

## 2018-09-28 PROCEDURE — 6360000002 HC RX W HCPCS: Performed by: ORTHOPAEDIC SURGERY

## 2018-09-28 PROCEDURE — C1776 JOINT DEVICE (IMPLANTABLE): HCPCS | Performed by: ORTHOPAEDIC SURGERY

## 2018-09-28 PROCEDURE — G8978 MOBILITY CURRENT STATUS: HCPCS

## 2018-09-28 PROCEDURE — G8988 SELF CARE GOAL STATUS: HCPCS

## 2018-09-28 PROCEDURE — 3600000004 HC SURGERY LEVEL 4 BASE: Performed by: ORTHOPAEDIC SURGERY

## 2018-09-28 PROCEDURE — 3700000001 HC ADD 15 MINUTES (ANESTHESIA): Performed by: ORTHOPAEDIC SURGERY

## 2018-09-28 PROCEDURE — 64447 NJX AA&/STRD FEMORAL NRV IMG: CPT | Performed by: ANESTHESIOLOGY

## 2018-09-28 PROCEDURE — 7100000001 HC PACU RECOVERY - ADDTL 15 MIN: Performed by: ORTHOPAEDIC SURGERY

## 2018-09-28 PROCEDURE — 6360000002 HC RX W HCPCS: Performed by: NURSE PRACTITIONER

## 2018-09-28 PROCEDURE — G8979 MOBILITY GOAL STATUS: HCPCS

## 2018-09-28 PROCEDURE — 3700000000 HC ANESTHESIA ATTENDED CARE: Performed by: ORTHOPAEDIC SURGERY

## 2018-09-28 PROCEDURE — 0SRC0J9 REPLACEMENT OF RIGHT KNEE JOINT WITH SYNTHETIC SUBSTITUTE, CEMENTED, OPEN APPROACH: ICD-10-PCS | Performed by: ORTHOPAEDIC SURGERY

## 2018-09-28 PROCEDURE — 6370000000 HC RX 637 (ALT 250 FOR IP): Performed by: INTERNAL MEDICINE

## 2018-09-28 PROCEDURE — G8987 SELF CARE CURRENT STATUS: HCPCS

## 2018-09-28 DEVICE — DUP USE 338453 IMPL KNEE PSN ALL POLY PAT PLY 29MM: Type: IMPLANTABLE DEVICE | Site: PATELLA | Status: FUNCTIONAL

## 2018-09-28 DEVICE — PSN TIB STM 5 DEG SZ E R: Type: IMPLANTABLE DEVICE | Site: KNEE | Status: FUNCTIONAL

## 2018-09-28 DEVICE — CEMENT BNE 20ML 40GM ACRYL RESIN HI VISC RADPQ FAST SET: Type: IMPLANTABLE DEVICE | Status: FUNCTIONAL

## 2018-09-28 DEVICE — IMPL KNEE PERSONA FEM CEM PS STD RT SZ 5: Type: IMPLANTABLE DEVICE | Site: KNEE | Status: FUNCTIONAL

## 2018-09-28 DEVICE — IMPLANTABLE DEVICE: Type: IMPLANTABLE DEVICE | Site: KNEE | Status: FUNCTIONAL

## 2018-09-28 DEVICE — SYSTEM TOT KNEE CEM FEM TIB COMP STD TIB INSRT STD PAT: Type: IMPLANTABLE DEVICE | Site: KNEE | Status: FUNCTIONAL

## 2018-09-28 RX ORDER — ROPIVACAINE HYDROCHLORIDE 5 MG/ML
INJECTION, SOLUTION EPIDURAL; INFILTRATION; PERINEURAL PRN
Status: DISCONTINUED | OUTPATIENT
Start: 2018-09-28 | End: 2018-09-28 | Stop reason: SDUPTHER

## 2018-09-28 RX ORDER — SODIUM CHLORIDE 9 MG/ML
INJECTION, SOLUTION INTRAVENOUS CONTINUOUS
Status: DISCONTINUED | OUTPATIENT
Start: 2018-09-28 | End: 2018-09-30 | Stop reason: HOSPADM

## 2018-09-28 RX ORDER — MEPERIDINE HYDROCHLORIDE 25 MG/ML
12.5 INJECTION INTRAMUSCULAR; INTRAVENOUS; SUBCUTANEOUS EVERY 5 MIN PRN
Status: DISCONTINUED | OUTPATIENT
Start: 2018-09-28 | End: 2018-09-28 | Stop reason: HOSPADM

## 2018-09-28 RX ORDER — ASPIRIN 81 MG/1
81 TABLET ORAL 2 TIMES DAILY
Status: DISCONTINUED | OUTPATIENT
Start: 2018-09-29 | End: 2018-09-29

## 2018-09-28 RX ORDER — SODIUM CHLORIDE 0.9 % (FLUSH) 0.9 %
10 SYRINGE (ML) INJECTION EVERY 12 HOURS SCHEDULED
Status: DISCONTINUED | OUTPATIENT
Start: 2018-09-28 | End: 2018-09-30 | Stop reason: HOSPADM

## 2018-09-28 RX ORDER — HYDROCODONE BITARTRATE AND ACETAMINOPHEN 5; 325 MG/1; MG/1
2 TABLET ORAL EVERY 4 HOURS PRN
Status: DISCONTINUED | OUTPATIENT
Start: 2018-09-28 | End: 2018-09-30 | Stop reason: HOSPADM

## 2018-09-28 RX ORDER — DIPHENHYDRAMINE HYDROCHLORIDE 50 MG/ML
12.5 INJECTION INTRAMUSCULAR; INTRAVENOUS
Status: DISCONTINUED | OUTPATIENT
Start: 2018-09-28 | End: 2018-09-28 | Stop reason: HOSPADM

## 2018-09-28 RX ORDER — FAMOTIDINE 20 MG/1
20 TABLET, FILM COATED ORAL 2 TIMES DAILY PRN
Status: DISCONTINUED | OUTPATIENT
Start: 2018-09-28 | End: 2018-09-30 | Stop reason: HOSPADM

## 2018-09-28 RX ORDER — MAGNESIUM HYDROXIDE 1200 MG/15ML
LIQUID ORAL CONTINUOUS PRN
Status: COMPLETED | OUTPATIENT
Start: 2018-09-28 | End: 2018-09-28

## 2018-09-28 RX ORDER — SODIUM CHLORIDE 0.9 % (FLUSH) 0.9 %
10 SYRINGE (ML) INJECTION EVERY 12 HOURS SCHEDULED
Status: DISCONTINUED | OUTPATIENT
Start: 2018-09-28 | End: 2018-09-28 | Stop reason: HOSPADM

## 2018-09-28 RX ORDER — CELECOXIB 200 MG/1
400 CAPSULE ORAL ONCE
Status: DISCONTINUED | OUTPATIENT
Start: 2018-09-28 | End: 2018-09-28 | Stop reason: HOSPADM

## 2018-09-28 RX ORDER — CARVEDILOL 12.5 MG/1
12.5 TABLET ORAL 2 TIMES DAILY
Status: DISCONTINUED | OUTPATIENT
Start: 2018-09-28 | End: 2018-09-30 | Stop reason: HOSPADM

## 2018-09-28 RX ORDER — METOCLOPRAMIDE HYDROCHLORIDE 5 MG/ML
10 INJECTION INTRAMUSCULAR; INTRAVENOUS
Status: DISCONTINUED | OUTPATIENT
Start: 2018-09-28 | End: 2018-09-28 | Stop reason: HOSPADM

## 2018-09-28 RX ORDER — SODIUM CHLORIDE, SODIUM LACTATE, POTASSIUM CHLORIDE, CALCIUM CHLORIDE 600; 310; 30; 20 MG/100ML; MG/100ML; MG/100ML; MG/100ML
INJECTION, SOLUTION INTRAVENOUS CONTINUOUS
Status: DISCONTINUED | OUTPATIENT
Start: 2018-09-28 | End: 2018-09-28

## 2018-09-28 RX ORDER — ONDANSETRON 2 MG/ML
4 INJECTION INTRAMUSCULAR; INTRAVENOUS
Status: DISCONTINUED | OUTPATIENT
Start: 2018-09-28 | End: 2018-09-28 | Stop reason: HOSPADM

## 2018-09-28 RX ORDER — OXYCODONE HCL 10 MG/1
10 TABLET, FILM COATED, EXTENDED RELEASE ORAL ONCE
Status: DISCONTINUED | OUTPATIENT
Start: 2018-09-28 | End: 2018-09-28 | Stop reason: HOSPADM

## 2018-09-28 RX ORDER — PROPOFOL 10 MG/ML
INJECTION, EMULSION INTRAVENOUS CONTINUOUS PRN
Status: DISCONTINUED | OUTPATIENT
Start: 2018-09-28 | End: 2018-09-28 | Stop reason: SDUPTHER

## 2018-09-28 RX ORDER — SODIUM CHLORIDE 0.9 % (FLUSH) 0.9 %
10 SYRINGE (ML) INJECTION PRN
Status: DISCONTINUED | OUTPATIENT
Start: 2018-09-28 | End: 2018-09-28 | Stop reason: HOSPADM

## 2018-09-28 RX ORDER — KETOROLAC TROMETHAMINE 30 MG/ML
30 INJECTION, SOLUTION INTRAMUSCULAR; INTRAVENOUS EVERY 6 HOURS
Status: COMPLETED | OUTPATIENT
Start: 2018-09-28 | End: 2018-09-28

## 2018-09-28 RX ORDER — BISACODYL 10 MG
10 SUPPOSITORY, RECTAL RECTAL DAILY PRN
Status: DISCONTINUED | OUTPATIENT
Start: 2018-09-28 | End: 2018-09-30 | Stop reason: HOSPADM

## 2018-09-28 RX ORDER — LIDOCAINE HYDROCHLORIDE 10 MG/ML
1 INJECTION, SOLUTION EPIDURAL; INFILTRATION; INTRACAUDAL; PERINEURAL
Status: DISCONTINUED | OUTPATIENT
Start: 2018-09-28 | End: 2018-09-28 | Stop reason: HOSPADM

## 2018-09-28 RX ORDER — MIDAZOLAM HYDROCHLORIDE 1 MG/ML
INJECTION INTRAMUSCULAR; INTRAVENOUS PRN
Status: DISCONTINUED | OUTPATIENT
Start: 2018-09-28 | End: 2018-09-28 | Stop reason: SDUPTHER

## 2018-09-28 RX ORDER — RANITIDINE 150 MG/1
150 TABLET ORAL 2 TIMES DAILY PRN
Status: DISCONTINUED | OUTPATIENT
Start: 2018-09-28 | End: 2018-09-28 | Stop reason: CLARIF

## 2018-09-28 RX ORDER — ONDANSETRON 2 MG/ML
INJECTION INTRAMUSCULAR; INTRAVENOUS PRN
Status: DISCONTINUED | OUTPATIENT
Start: 2018-09-28 | End: 2018-09-28 | Stop reason: SDUPTHER

## 2018-09-28 RX ORDER — FENTANYL CITRATE 50 UG/ML
INJECTION, SOLUTION INTRAMUSCULAR; INTRAVENOUS PRN
Status: DISCONTINUED | OUTPATIENT
Start: 2018-09-28 | End: 2018-09-28 | Stop reason: SDUPTHER

## 2018-09-28 RX ORDER — FENTANYL CITRATE 50 UG/ML
50 INJECTION, SOLUTION INTRAMUSCULAR; INTRAVENOUS EVERY 10 MIN PRN
Status: DISCONTINUED | OUTPATIENT
Start: 2018-09-28 | End: 2018-09-28 | Stop reason: HOSPADM

## 2018-09-28 RX ORDER — HYDROCODONE BITARTRATE AND ACETAMINOPHEN 5; 325 MG/1; MG/1
1 TABLET ORAL EVERY 4 HOURS PRN
Status: DISCONTINUED | OUTPATIENT
Start: 2018-09-28 | End: 2018-09-30 | Stop reason: HOSPADM

## 2018-09-28 RX ORDER — ACETAMINOPHEN 500 MG
1000 TABLET ORAL ONCE
Status: DISCONTINUED | OUTPATIENT
Start: 2018-09-28 | End: 2018-09-28 | Stop reason: HOSPADM

## 2018-09-28 RX ORDER — MORPHINE SULFATE 2 MG/ML
2 INJECTION, SOLUTION INTRAMUSCULAR; INTRAVENOUS
Status: DISCONTINUED | OUTPATIENT
Start: 2018-09-28 | End: 2018-09-28

## 2018-09-28 RX ORDER — SENNA AND DOCUSATE SODIUM 50; 8.6 MG/1; MG/1
1 TABLET, FILM COATED ORAL DAILY
Status: DISCONTINUED | OUTPATIENT
Start: 2018-09-28 | End: 2018-09-30 | Stop reason: HOSPADM

## 2018-09-28 RX ORDER — MORPHINE SULFATE 4 MG/ML
4 INJECTION, SOLUTION INTRAMUSCULAR; INTRAVENOUS
Status: DISCONTINUED | OUTPATIENT
Start: 2018-09-28 | End: 2018-09-28

## 2018-09-28 RX ORDER — SODIUM CHLORIDE, SODIUM LACTATE, POTASSIUM CHLORIDE, CALCIUM CHLORIDE 600; 310; 30; 20 MG/100ML; MG/100ML; MG/100ML; MG/100ML
INJECTION, SOLUTION INTRAVENOUS CONTINUOUS PRN
Status: DISCONTINUED | OUTPATIENT
Start: 2018-09-28 | End: 2018-09-28 | Stop reason: SDUPTHER

## 2018-09-28 RX ORDER — ONDANSETRON 2 MG/ML
4 INJECTION INTRAMUSCULAR; INTRAVENOUS EVERY 6 HOURS PRN
Status: DISCONTINUED | OUTPATIENT
Start: 2018-09-28 | End: 2018-09-30 | Stop reason: HOSPADM

## 2018-09-28 RX ORDER — DEXAMETHASONE SODIUM PHOSPHATE 10 MG/ML
INJECTION INTRAMUSCULAR; INTRAVENOUS PRN
Status: DISCONTINUED | OUTPATIENT
Start: 2018-09-28 | End: 2018-09-28 | Stop reason: SDUPTHER

## 2018-09-28 RX ORDER — DOCUSATE SODIUM 100 MG/1
100 CAPSULE, LIQUID FILLED ORAL 2 TIMES DAILY
Status: DISCONTINUED | OUTPATIENT
Start: 2018-09-28 | End: 2018-09-30 | Stop reason: HOSPADM

## 2018-09-28 RX ORDER — SODIUM CHLORIDE 0.9 % (FLUSH) 0.9 %
10 SYRINGE (ML) INJECTION PRN
Status: DISCONTINUED | OUTPATIENT
Start: 2018-09-28 | End: 2018-09-30 | Stop reason: HOSPADM

## 2018-09-28 RX ORDER — ACETAMINOPHEN 325 MG/1
650 TABLET ORAL EVERY 6 HOURS
Status: DISCONTINUED | OUTPATIENT
Start: 2018-09-28 | End: 2018-09-29

## 2018-09-28 RX ORDER — GABAPENTIN 100 MG/1
200 CAPSULE ORAL 2 TIMES DAILY
Status: DISCONTINUED | OUTPATIENT
Start: 2018-09-28 | End: 2018-09-30 | Stop reason: HOSPADM

## 2018-09-28 RX ORDER — LIDOCAINE HYDROCHLORIDE 10 MG/ML
INJECTION, SOLUTION INFILTRATION; PERINEURAL PRN
Status: DISCONTINUED | OUTPATIENT
Start: 2018-09-28 | End: 2018-09-28 | Stop reason: SDUPTHER

## 2018-09-28 RX ADMIN — PROPOFOL 75 MCG/KG/MIN: 10 INJECTION, EMULSION INTRAVENOUS at 10:00

## 2018-09-28 RX ADMIN — MIDAZOLAM HYDROCHLORIDE 0.5 MG: 1 INJECTION, SOLUTION INTRAMUSCULAR; INTRAVENOUS at 10:15

## 2018-09-28 RX ADMIN — ONDANSETRON HYDROCHLORIDE 4 MG: 2 INJECTION, SOLUTION INTRAMUSCULAR; INTRAVENOUS at 16:14

## 2018-09-28 RX ADMIN — LIDOCAINE HYDROCHLORIDE 50 MG: 10 INJECTION, SOLUTION INFILTRATION; PERINEURAL at 10:00

## 2018-09-28 RX ADMIN — ACETAMINOPHEN 650 MG: 325 TABLET ORAL at 19:40

## 2018-09-28 RX ADMIN — KETOROLAC TROMETHAMINE 30 MG: 30 INJECTION, SOLUTION INTRAMUSCULAR at 19:40

## 2018-09-28 RX ADMIN — ONDANSETRON HYDROCHLORIDE 4 MG: 2 INJECTION, SOLUTION INTRAMUSCULAR; INTRAVENOUS at 10:00

## 2018-09-28 RX ADMIN — SODIUM CHLORIDE, POTASSIUM CHLORIDE, SODIUM LACTATE AND CALCIUM CHLORIDE: 600; 310; 30; 20 INJECTION, SOLUTION INTRAVENOUS at 10:50

## 2018-09-28 RX ADMIN — KETOROLAC TROMETHAMINE 30 MG: 30 INJECTION, SOLUTION INTRAMUSCULAR at 13:42

## 2018-09-28 RX ADMIN — DEXAMETHASONE SODIUM PHOSPHATE 5 MG: 10 INJECTION INTRAMUSCULAR; INTRAVENOUS at 10:00

## 2018-09-28 RX ADMIN — SODIUM CHLORIDE: 9 INJECTION, SOLUTION INTRAVENOUS at 13:42

## 2018-09-28 RX ADMIN — MIDAZOLAM HYDROCHLORIDE 2 MG: 1 INJECTION, SOLUTION INTRAMUSCULAR; INTRAVENOUS at 09:40

## 2018-09-28 RX ADMIN — Medication 2 G: at 09:57

## 2018-09-28 RX ADMIN — Medication 2 G: at 18:15

## 2018-09-28 RX ADMIN — SODIUM CHLORIDE, POTASSIUM CHLORIDE, SODIUM LACTATE AND CALCIUM CHLORIDE: 600; 310; 30; 20 INJECTION, SOLUTION INTRAVENOUS at 08:38

## 2018-09-28 RX ADMIN — TRANEXAMIC ACID 1 G: 1 INJECTION, SOLUTION INTRAVENOUS at 10:10

## 2018-09-28 RX ADMIN — MIDAZOLAM HYDROCHLORIDE 0.5 MG: 1 INJECTION, SOLUTION INTRAMUSCULAR; INTRAVENOUS at 10:45

## 2018-09-28 RX ADMIN — GABAPENTIN 200 MG: 100 CAPSULE ORAL at 21:59

## 2018-09-28 RX ADMIN — GABAPENTIN 200 MG: 100 CAPSULE ORAL at 13:42

## 2018-09-28 RX ADMIN — DOCUSATE SODIUM 100 MG: 100 CAPSULE, LIQUID FILLED ORAL at 21:59

## 2018-09-28 RX ADMIN — HYDROCODONE BITARTRATE AND ACETAMINOPHEN 2 TABLET: 5; 325 TABLET ORAL at 13:42

## 2018-09-28 RX ADMIN — MIDAZOLAM HYDROCHLORIDE 0.5 MG: 1 INJECTION, SOLUTION INTRAMUSCULAR; INTRAVENOUS at 10:30

## 2018-09-28 RX ADMIN — HYDROCODONE BITARTRATE AND ACETAMINOPHEN 2 TABLET: 5; 325 TABLET ORAL at 17:40

## 2018-09-28 RX ADMIN — FAMOTIDINE 20 MG: 20 TABLET ORAL at 13:43

## 2018-09-28 RX ADMIN — SODIUM CHLORIDE, POTASSIUM CHLORIDE, SODIUM LACTATE AND CALCIUM CHLORIDE: 600; 310; 30; 20 INJECTION, SOLUTION INTRAVENOUS at 08:39

## 2018-09-28 RX ADMIN — FENTANYL CITRATE 100 MCG: 50 INJECTION, SOLUTION INTRAMUSCULAR; INTRAVENOUS at 09:40

## 2018-09-28 RX ADMIN — SENNOSIDES AND DOCUSATE SODIUM 1 TABLET: 8.6; 5 TABLET ORAL at 21:59

## 2018-09-28 RX ADMIN — ROPIVACAINE HYDROCHLORIDE 30 ML: 5 INJECTION, SOLUTION EPIDURAL; INFILTRATION; PERINEURAL at 09:50

## 2018-09-28 RX ADMIN — CARVEDILOL 12.5 MG: 12.5 TABLET, FILM COATED ORAL at 21:59

## 2018-09-28 RX ADMIN — HYDROCODONE BITARTRATE AND ACETAMINOPHEN 2 TABLET: 5; 325 TABLET ORAL at 22:17

## 2018-09-28 RX ADMIN — MIDAZOLAM HYDROCHLORIDE 0.5 MG: 1 INJECTION, SOLUTION INTRAMUSCULAR; INTRAVENOUS at 10:00

## 2018-09-28 RX ADMIN — TRANEXAMIC ACID 1000 MG: 1 INJECTION, SOLUTION INTRAVENOUS at 12:03

## 2018-09-28 ASSESSMENT — PULMONARY FUNCTION TESTS
PIF_VALUE: 0
PIF_VALUE: 1
PIF_VALUE: 0

## 2018-09-28 ASSESSMENT — PAIN DESCRIPTION - ORIENTATION
ORIENTATION: RIGHT
ORIENTATION: RIGHT
ORIENTATION: LEFT

## 2018-09-28 ASSESSMENT — PAIN DESCRIPTION - DESCRIPTORS
DESCRIPTORS: THROBBING
DESCRIPTORS: BURNING;ACHING
DESCRIPTORS: THROBBING

## 2018-09-28 ASSESSMENT — PAIN SCALES - GENERAL
PAINLEVEL_OUTOF10: 5
PAINLEVEL_OUTOF10: 7
PAINLEVEL_OUTOF10: 4
PAINLEVEL_OUTOF10: 6
PAINLEVEL_OUTOF10: 5
PAINLEVEL_OUTOF10: 6
PAINLEVEL_OUTOF10: 3

## 2018-09-28 ASSESSMENT — PAIN DESCRIPTION - PAIN TYPE
TYPE: SURGICAL PAIN
TYPE: SURGICAL PAIN
TYPE: ACUTE PAIN;SURGICAL PAIN

## 2018-09-28 ASSESSMENT — PAIN DESCRIPTION - ONSET: ONSET: GRADUAL

## 2018-09-28 ASSESSMENT — PAIN DESCRIPTION - LOCATION
LOCATION: KNEE

## 2018-09-28 ASSESSMENT — PAIN DESCRIPTION - PROGRESSION: CLINICAL_PROGRESSION: NOT CHANGED

## 2018-09-28 ASSESSMENT — PAIN DESCRIPTION - FREQUENCY: FREQUENCY: CONTINUOUS

## 2018-09-28 NOTE — ANESTHESIA PRE PROCEDURE
Department of Anesthesiology  Preprocedure Note       Name:  Roe Frank   Age:  47 y.o.  :  1965                                          MRN:  [id number]         Date:  2018      Surgeon: Kasandra Walker):  Wilver Hope MD    Procedure: Procedure(s):  RIGHT  KNEE TOTAL ARTHROPLASTY, SUPINE, SRUTHI CEMENTED PERSONA    Medications prior to admission:   Prior to Admission medications    Medication Sig Start Date End Date Taking? Authorizing Provider   gabapentin (NEURONTIN) 100 MG capsule Take 2 capsules by mouth 2 times daily 9/27/18 10/27/18 Yes Darío Heard DO   loratadine (CLARITIN) 10 MG capsule Take 10 mg by mouth daily   Yes Historical Provider, MD   oxyCODONE-acetaminophen (PERCOCET) 5-325 MG per tablet Take 1 tablet by mouth every 6 hours as needed for Pain. .   Yes Historical Provider, MD   HYDROcodone-acetaminophen (NORCO) 5-325 MG per tablet Take 1 tablet by mouth every 6 hours as needed for Pain. .   Yes Historical Provider, MD   cyclobenzaprine (FLEXERIL) 10 MG tablet Take 1 tablet by mouth 3 times daily as needed for Muscle spasms 18  Yes Janett Pappas DO   acetaminophen (TYLENOL) 500 MG tablet Take 500 mg by mouth every 6 hours as needed for Pain   Yes Historical Provider, MD   carvedilol (COREG) 12.5 MG tablet Take 1 tablet by mouth 2 times daily 18  Yes Janett Papaps DO   ranitidine (ZANTAC) 150 MG tablet Take 150 mg by mouth 2 times daily as needed    Yes Historical Provider, MD   gabapentin (NEURONTIN) 100 MG capsule Take 2 capsules by mouth 2 times daily for 2 days. . 18  Olla Halsted, MD       Current medications:    Current Facility-Administered Medications   Medication Dose Route Frequency Provider Last Rate Last Dose    tranexamic acid (CYKLOKAPRON) 1,000 mg in sodium chloride 0.9 % 50 mL IVPB  1,000 mg Intravenous On Call to [facility] [address], APRN - CNP        tranexamic acid (CYKLOKAPRON) 1,000 mg in sodium chloride 0.9 % 100 mL IVPB  1,000 213 09/05/2018       CMP:   Lab Results   Component Value Date     09/05/2018    K 4.4 09/05/2018     09/05/2018    CO2 22 09/05/2018    BUN 21 09/05/2018    CREATININE 0.53 09/05/2018    GFRAA >60.0 09/05/2018    LABGLOM >60.0 09/05/2018    GLUCOSE 91 09/05/2018    PROT 7.4 09/05/2018    CALCIUM 9.6 09/05/2018    BILITOT 0.5 09/05/2018    ALKPHOS 124 09/05/2018    AST 19 09/05/2018    ALT 17 09/05/2018       POC Tests: No results for input(s): POCGLU, POCNA, POCK, POCCL, POCBUN, POCHEMO, POCHCT in the last 72 hours. Coags:   Lab Results   Component Value Date    PROTIME 10.1 09/05/2018    INR 1.0 09/05/2018    APTT 26.6 09/05/2018       HCG (If Applicable): No results found for: PREGTESTUR, PREGSERUM, HCG, HCGQUANT     ABGs: No results found for: PHART, PO2ART, OBP9KPJ, QSW4DWY, BEART, B8KLUUOB     Type & Screen (If Applicable):  No results found for: LABABO, [address]    Anesthesia Evaluation  Patient summary reviewed and Nursing notes reviewed   history of anesthetic complications: PONV. Airway: Mallampati: II  TM distance: >3 FB   Neck ROM: full  Mouth opening: > = 3 FB Dental: normal exam         Pulmonary:Negative Pulmonary ROS and normal exam                               Cardiovascular:    (+) hypertension: no interval change,       ECG reviewed                        Neuro/Psych:   Negative Neuro/Psych ROS              GI/Hepatic/Renal:   (+) morbid obesity          Endo/Other: Negative Endo/Other ROS             Pt had PAT visit. Abdominal:   (+) obese,         Vascular: negative vascular ROS. Anesthesia Plan      spinal     ASA 3           MIPS: Prophylactic antiemetics administered. Anesthetic plan and risks discussed with patient. Plan discussed with CRNA.     Attending anesthesiologist reviewed and agrees with Pre Eval content              Effie Flynn MD   9/28/2018

## 2018-09-29 LAB
ANION GAP SERPL CALCULATED.3IONS-SCNC: 10 MEQ/L (ref 7–13)
BUN BLDV-MCNC: 12 MG/DL (ref 6–20)
CALCIUM SERPL-MCNC: 8.4 MG/DL (ref 8.6–10.2)
CHLORIDE BLD-SCNC: 104 MEQ/L (ref 98–107)
CO2: 27 MEQ/L (ref 22–29)
CREAT SERPL-MCNC: 0.47 MG/DL (ref 0.5–0.9)
GFR AFRICAN AMERICAN: >60
GFR NON-AFRICAN AMERICAN: >60
GLUCOSE BLD-MCNC: 162 MG/DL (ref 74–109)
HCT VFR BLD CALC: 34.8 % (ref 37–47)
HEMOGLOBIN: 11.8 G/DL (ref 12–16)
MCH RBC QN AUTO: 29 PG (ref 27–31.3)
MCHC RBC AUTO-ENTMCNC: 33.9 % (ref 33–37)
MCV RBC AUTO: 85.6 FL (ref 82–100)
PDW BLD-RTO: 13.9 % (ref 11.5–14.5)
PLATELET # BLD: 195 K/UL (ref 130–400)
POTASSIUM REFLEX MAGNESIUM: 4.2 MEQ/L (ref 3.5–5.1)
RBC # BLD: 4.06 M/UL (ref 4.2–5.4)
SODIUM BLD-SCNC: 141 MEQ/L (ref 132–144)
WBC # BLD: 9.6 K/UL (ref 4.8–10.8)

## 2018-09-29 PROCEDURE — 2580000003 HC RX 258: Performed by: ORTHOPAEDIC SURGERY

## 2018-09-29 PROCEDURE — 6370000000 HC RX 637 (ALT 250 FOR IP): Performed by: ORTHOPAEDIC SURGERY

## 2018-09-29 PROCEDURE — 97116 GAIT TRAINING THERAPY: CPT

## 2018-09-29 PROCEDURE — 85027 COMPLETE CBC AUTOMATED: CPT

## 2018-09-29 PROCEDURE — 96374 THER/PROPH/DIAG INJ IV PUSH: CPT

## 2018-09-29 PROCEDURE — 6360000002 HC RX W HCPCS: Performed by: ORTHOPAEDIC SURGERY

## 2018-09-29 PROCEDURE — 97535 SELF CARE MNGMENT TRAINING: CPT

## 2018-09-29 PROCEDURE — 6370000000 HC RX 637 (ALT 250 FOR IP): Performed by: NURSE PRACTITIONER

## 2018-09-29 PROCEDURE — 1210000000 HC MED SURG R&B

## 2018-09-29 PROCEDURE — G0378 HOSPITAL OBSERVATION PER HR: HCPCS

## 2018-09-29 PROCEDURE — 80048 BASIC METABOLIC PNL TOTAL CA: CPT

## 2018-09-29 PROCEDURE — 6360000002 HC RX W HCPCS: Performed by: NURSE PRACTITIONER

## 2018-09-29 PROCEDURE — 36415 COLL VENOUS BLD VENIPUNCTURE: CPT

## 2018-09-29 PROCEDURE — 6370000000 HC RX 637 (ALT 250 FOR IP): Performed by: INTERNAL MEDICINE

## 2018-09-29 RX ORDER — PSEUDOEPHEDRINE HCL 30 MG
100 TABLET ORAL 2 TIMES DAILY
Qty: 60 CAPSULE | Refills: 0 | Status: SHIPPED | OUTPATIENT
Start: 2018-09-29 | End: 2018-10-29

## 2018-09-29 RX ORDER — KETOROLAC TROMETHAMINE 30 MG/ML
30 INJECTION, SOLUTION INTRAMUSCULAR; INTRAVENOUS ONCE
Status: COMPLETED | OUTPATIENT
Start: 2018-09-29 | End: 2018-09-29

## 2018-09-29 RX ORDER — HYDROCODONE BITARTRATE AND ACETAMINOPHEN 5; 325 MG/1; MG/1
1 TABLET ORAL EVERY 4 HOURS PRN
Qty: 50 TABLET | Refills: 0 | Status: SHIPPED | OUTPATIENT
Start: 2018-09-29 | End: 2018-10-06

## 2018-09-29 RX ORDER — LIDOCAINE 50 MG/G
2 PATCH TOPICAL DAILY
Status: DISCONTINUED | OUTPATIENT
Start: 2018-09-29 | End: 2018-09-30 | Stop reason: HOSPADM

## 2018-09-29 RX ORDER — CEPHALEXIN 250 MG/1
250 CAPSULE ORAL EVERY 8 HOURS SCHEDULED
Status: DISCONTINUED | OUTPATIENT
Start: 2018-09-29 | End: 2018-09-30 | Stop reason: HOSPADM

## 2018-09-29 RX ORDER — ASPIRIN 81 MG/1
81 TABLET ORAL 2 TIMES DAILY
Qty: 30 TABLET | Refills: 0 | Status: SHIPPED | OUTPATIENT
Start: 2018-09-29 | End: 2018-10-29

## 2018-09-29 RX ADMIN — GABAPENTIN 200 MG: 100 CAPSULE ORAL at 20:56

## 2018-09-29 RX ADMIN — HYDROCODONE BITARTRATE AND ACETAMINOPHEN 2 TABLET: 5; 325 TABLET ORAL at 13:19

## 2018-09-29 RX ADMIN — DOCUSATE SODIUM 100 MG: 100 CAPSULE, LIQUID FILLED ORAL at 20:56

## 2018-09-29 RX ADMIN — CEPHALEXIN 250 MG: 250 CAPSULE ORAL at 22:01

## 2018-09-29 RX ADMIN — Medication 2 G: at 02:00

## 2018-09-29 RX ADMIN — CARVEDILOL 12.5 MG: 12.5 TABLET, FILM COATED ORAL at 20:56

## 2018-09-29 RX ADMIN — Medication 10 ML: at 09:12

## 2018-09-29 RX ADMIN — SENNOSIDES AND DOCUSATE SODIUM 1 TABLET: 8.6; 5 TABLET ORAL at 09:10

## 2018-09-29 RX ADMIN — ACETAMINOPHEN 650 MG: 325 TABLET ORAL at 01:40

## 2018-09-29 RX ADMIN — DOCUSATE SODIUM 100 MG: 100 CAPSULE, LIQUID FILLED ORAL at 09:09

## 2018-09-29 RX ADMIN — Medication 10 ML: at 21:02

## 2018-09-29 RX ADMIN — ASPIRIN 81 MG: 81 TABLET, COATED ORAL at 01:00

## 2018-09-29 RX ADMIN — HYDROCODONE BITARTRATE AND ACETAMINOPHEN 2 TABLET: 5; 325 TABLET ORAL at 17:59

## 2018-09-29 RX ADMIN — GABAPENTIN 200 MG: 100 CAPSULE ORAL at 09:09

## 2018-09-29 RX ADMIN — CEPHALEXIN 250 MG: 250 CAPSULE ORAL at 15:47

## 2018-09-29 RX ADMIN — CARVEDILOL 12.5 MG: 12.5 TABLET, FILM COATED ORAL at 09:12

## 2018-09-29 RX ADMIN — HYDROCODONE BITARTRATE AND ACETAMINOPHEN 2 TABLET: 5; 325 TABLET ORAL at 09:11

## 2018-09-29 RX ADMIN — HYDROCODONE BITARTRATE AND ACETAMINOPHEN 1 TABLET: 5; 325 TABLET ORAL at 03:04

## 2018-09-29 RX ADMIN — KETOROLAC TROMETHAMINE 30 MG: 30 INJECTION, SOLUTION INTRAMUSCULAR at 13:19

## 2018-09-29 RX ADMIN — HYDROCODONE BITARTRATE AND ACETAMINOPHEN 2 TABLET: 5; 325 TABLET ORAL at 22:02

## 2018-09-29 ASSESSMENT — PAIN DESCRIPTION - LOCATION
LOCATION: KNEE

## 2018-09-29 ASSESSMENT — PAIN SCALES - GENERAL
PAINLEVEL_OUTOF10: 4
PAINLEVEL_OUTOF10: 6
PAINLEVEL_OUTOF10: 6
PAINLEVEL_OUTOF10: 4
PAINLEVEL_OUTOF10: 2
PAINLEVEL_OUTOF10: 5
PAINLEVEL_OUTOF10: 2
PAINLEVEL_OUTOF10: 5
PAINLEVEL_OUTOF10: 4
PAINLEVEL_OUTOF10: 6
PAINLEVEL_OUTOF10: 4

## 2018-09-29 ASSESSMENT — PAIN DESCRIPTION - ORIENTATION
ORIENTATION: RIGHT

## 2018-09-29 ASSESSMENT — PAIN DESCRIPTION - PAIN TYPE
TYPE: SURGICAL PAIN
TYPE: ACUTE PAIN;SURGICAL PAIN

## 2018-09-30 VITALS
OXYGEN SATURATION: 100 % | SYSTOLIC BLOOD PRESSURE: 128 MMHG | WEIGHT: 242 LBS | BODY MASS INDEX: 40.32 KG/M2 | HEART RATE: 78 BPM | DIASTOLIC BLOOD PRESSURE: 67 MMHG | RESPIRATION RATE: 20 BRPM | TEMPERATURE: 98.2 F | HEIGHT: 65 IN

## 2018-09-30 LAB
HCT VFR BLD CALC: 34.8 % (ref 37–47)
HEMOGLOBIN: 11.7 G/DL (ref 12–16)
MCH RBC QN AUTO: 29 PG (ref 27–31.3)
MCHC RBC AUTO-ENTMCNC: 33.7 % (ref 33–37)
MCV RBC AUTO: 86.3 FL (ref 82–100)
PDW BLD-RTO: 14.2 % (ref 11.5–14.5)
PLATELET # BLD: 189 K/UL (ref 130–400)
RBC # BLD: 4.04 M/UL (ref 4.2–5.4)
WBC # BLD: 10.7 K/UL (ref 4.8–10.8)

## 2018-09-30 PROCEDURE — 97535 SELF CARE MNGMENT TRAINING: CPT

## 2018-09-30 PROCEDURE — 97116 GAIT TRAINING THERAPY: CPT

## 2018-09-30 PROCEDURE — 6370000000 HC RX 637 (ALT 250 FOR IP): Performed by: INTERNAL MEDICINE

## 2018-09-30 PROCEDURE — 6370000000 HC RX 637 (ALT 250 FOR IP): Performed by: NURSE PRACTITIONER

## 2018-09-30 PROCEDURE — 2580000003 HC RX 258: Performed by: ORTHOPAEDIC SURGERY

## 2018-09-30 PROCEDURE — 36415 COLL VENOUS BLD VENIPUNCTURE: CPT

## 2018-09-30 PROCEDURE — 6370000000 HC RX 637 (ALT 250 FOR IP): Performed by: ORTHOPAEDIC SURGERY

## 2018-09-30 PROCEDURE — 85027 COMPLETE CBC AUTOMATED: CPT

## 2018-09-30 PROCEDURE — G0378 HOSPITAL OBSERVATION PER HR: HCPCS

## 2018-09-30 PROCEDURE — 97110 THERAPEUTIC EXERCISES: CPT

## 2018-09-30 RX ORDER — DIAZEPAM 5 MG/1
5 TABLET ORAL ONCE
Status: COMPLETED | OUTPATIENT
Start: 2018-09-30 | End: 2018-09-30

## 2018-09-30 RX ORDER — CEPHALEXIN 500 MG/1
500 CAPSULE ORAL 3 TIMES DAILY
Qty: 15 CAPSULE | Refills: 0 | Status: SHIPPED | OUTPATIENT
Start: 2018-09-30 | End: 2018-10-05

## 2018-09-30 RX ADMIN — HYDROCODONE BITARTRATE AND ACETAMINOPHEN 1 TABLET: 5; 325 TABLET ORAL at 14:25

## 2018-09-30 RX ADMIN — DOCUSATE SODIUM 100 MG: 100 CAPSULE, LIQUID FILLED ORAL at 09:30

## 2018-09-30 RX ADMIN — DIAZEPAM 5 MG: 5 TABLET ORAL at 01:28

## 2018-09-30 RX ADMIN — GABAPENTIN 200 MG: 100 CAPSULE ORAL at 09:30

## 2018-09-30 RX ADMIN — SENNOSIDES AND DOCUSATE SODIUM 1 TABLET: 8.6; 5 TABLET ORAL at 09:30

## 2018-09-30 RX ADMIN — HYDROCODONE BITARTRATE AND ACETAMINOPHEN 2 TABLET: 5; 325 TABLET ORAL at 09:30

## 2018-09-30 RX ADMIN — CARVEDILOL 12.5 MG: 12.5 TABLET, FILM COATED ORAL at 09:30

## 2018-09-30 RX ADMIN — Medication 10 ML: at 09:31

## 2018-09-30 RX ADMIN — CEPHALEXIN 250 MG: 250 CAPSULE ORAL at 05:20

## 2018-09-30 RX ADMIN — CEPHALEXIN 250 MG: 250 CAPSULE ORAL at 14:25

## 2018-09-30 RX ADMIN — HYDROCODONE BITARTRATE AND ACETAMINOPHEN 2 TABLET: 5; 325 TABLET ORAL at 05:21

## 2018-09-30 ASSESSMENT — ENCOUNTER SYMPTOMS
DIARRHEA: 0
VOMITING: 0
COUGH: 0
NAUSEA: 0
SHORTNESS OF BREATH: 0

## 2018-09-30 ASSESSMENT — PAIN SCALES - GENERAL
PAINLEVEL_OUTOF10: 5
PAINLEVEL_OUTOF10: 6
PAINLEVEL_OUTOF10: 5
PAINLEVEL_OUTOF10: 4
PAINLEVEL_OUTOF10: 6
PAINLEVEL_OUTOF10: 7

## 2018-09-30 ASSESSMENT — PAIN DESCRIPTION - PAIN TYPE
TYPE: SURGICAL PAIN

## 2018-09-30 ASSESSMENT — PAIN DESCRIPTION - DESCRIPTORS
DESCRIPTORS: ACHING
DESCRIPTORS: ACHING

## 2018-09-30 ASSESSMENT — PAIN DESCRIPTION - LOCATION
LOCATION: KNEE

## 2018-09-30 ASSESSMENT — PAIN DESCRIPTION - ORIENTATION
ORIENTATION: RIGHT

## 2018-10-01 ENCOUNTER — CARE COORDINATION (OUTPATIENT)
Dept: CASE MANAGEMENT | Age: 53
End: 2018-10-01

## 2018-10-01 DIAGNOSIS — Z96.651 STATUS POST TOTAL KNEE REPLACEMENT, RIGHT: Primary | ICD-10-CM

## 2018-10-10 ENCOUNTER — HOSPITAL ENCOUNTER (OUTPATIENT)
Dept: GENERAL RADIOLOGY | Age: 53
Discharge: HOME | End: 2018-10-12
Payer: COMMERCIAL

## 2018-10-10 DIAGNOSIS — M17.11 OSTEOARTHRITIS OF RIGHT KNEE, UNSPECIFIED OSTEOARTHRITIS TYPE: ICD-10-CM

## 2018-10-10 PROCEDURE — 73560 X-RAY EXAM OF KNEE 1 OR 2: CPT

## 2018-10-16 ENCOUNTER — HOSPITAL ENCOUNTER (OUTPATIENT)
Dept: PHYSICAL THERAPY | Age: 53
Setting detail: THERAPIES SERIES
Discharge: HOME | End: 2018-10-16
Payer: COMMERCIAL

## 2018-10-16 PROCEDURE — 97162 PT EVAL MOD COMPLEX 30 MIN: CPT

## 2018-10-16 ASSESSMENT — PAIN SCALES - GENERAL: PAINLEVEL_OUTOF10: 6

## 2018-10-16 ASSESSMENT — PAIN DESCRIPTION - PAIN TYPE: TYPE: ACUTE PAIN

## 2018-10-16 ASSESSMENT — PAIN DESCRIPTION - LOCATION: LOCATION: KNEE

## 2018-10-16 ASSESSMENT — PAIN DESCRIPTION - FREQUENCY: FREQUENCY: CONTINUOUS

## 2018-10-16 ASSESSMENT — PAIN DESCRIPTION - ORIENTATION: ORIENTATION: RIGHT

## 2018-10-16 ASSESSMENT — PAIN DESCRIPTION - ONSET: ONSET: AWAKENED FROM SLEEP

## 2018-10-16 ASSESSMENT — PAIN DESCRIPTION - DESCRIPTORS: DESCRIPTORS: ACHING;THROBBING

## 2018-10-16 NOTE — PROGRESS NOTES
manual techniques to be initiated when appropriate  Assessment: Body structures, Functions, Activity limitations: Decreased functional mobility , Decreased ADL status, Decreased ROM, Decreased strength  Assessment: The pt's impairments currently limit functional abilities by 70% including her abilities to stand prolonged periods, perform recreational actvities, and perform household/work related duties without pain or limitations. Prognosis: Good  Discharge Recommendations: Continue to assess pending progress  Activity Tolerance: Patient limited by fatigue     Decision Making: Medium Complexity  History: back surgery 2018  Exam: LEFS: 24/80=70% impaired, strength/rom deficit R LE, gait deviations  Clinical Presentation: evolving        Plan  Frequency/Duration:  Plan  Times per week: 3  Plan weeks: 4  Current Treatment Recommendations: Strengthening, ROM, Balance Training, Transfer Training, Gait Training, Stair training, Home Exercise Program, Neuromuscular Re-education, Modalities       Patient Education  New Education Provided: written HEP sink ex's and prone hang    POST-PAIN     Pain Rating (0-10 pain scale):  3 /10  Location and pain description same as pre-treatment unless indicated. Action: [x] NA  [] Call Physician  [] Perform HEP  [] Meds as prescribed    Evaluation and patient rights have been reviewed and patient agrees with plan of care. Yes  [x]  No  []   Explain:       [place] Fall Risk Assessment  Risk Factor Scale  Score   History of Falls [] Yes  [x] No 25  0 0   Secondary Diagnosis [] Yes  [x] No 15  0 0   Ambulatory Aid [] Furniture  [x] Crutches/cane/walker  [] None/bedrest/wheelchair/nurse 30  15  0 15   IV/Heparin Lock [] Yes  [x] No 20  0 0   Gait/Transferring [] Impaired  [x] Weak  [] Normal/bedrest/immobile 20  10  0 10   Mental Status [] Forgets limitations  [x] Oriented to own ability 15  0 0      Total:25     Based on the Assessment score: check the appropriate box.   []  No intervention needed   Low =   Score of 0-24  [x]  Use standard prevention interventions Moderate =  Score of 24-44   [x] Discuss fall prevention strategies   [x] Indicate moderate falls risk on eval  []  Use high risk prevention interventions High = Score of 45 and higher   [] Discuss fall prevention strategies   [] Provide supervision during treatment time    Goals  Short term goals  Time Frame for Short term goals: 4 weeks  Short term goal 1: Indep HEP  Short term goal 2: Decrease R knee pain to 0-1/10 to enable functional improvements in ADL's. Short term goal 3: Pain-free R knee AROM to 0-105° allowing an increase in ADL tolerance. Short term goal 4: Improve R LE strength 4-/5 to 4/5 throughout to allow patient to report greater than 85% normal function per functional survey score.   Short term goal 5: Ambulate with least restrictive device safe/Indep community distances with min to no deviations >/+ 25 min tolerance     PT Individual Minutes  Time In: 1302  Time Out: 1345  Minutes: 43  Timed Code Treatment Minutes: 8 Minutes  Procedure Minutes:35  Electronically signed by Jayshree Myers PT on 10/16/18 at 2:05 PM

## 2018-10-22 ENCOUNTER — HOSPITAL ENCOUNTER (OUTPATIENT)
Dept: PHYSICAL THERAPY | Age: 53
Setting detail: THERAPIES SERIES
Discharge: HOME | End: 2018-10-22
Payer: COMMERCIAL

## 2018-10-22 PROCEDURE — 97110 THERAPEUTIC EXERCISES: CPT

## 2018-10-22 PROCEDURE — 97140 MANUAL THERAPY 1/> REGIONS: CPT

## 2018-10-22 ASSESSMENT — PAIN DESCRIPTION - PAIN TYPE: TYPE: ACUTE PAIN

## 2018-10-22 ASSESSMENT — PAIN DESCRIPTION - LOCATION: LOCATION: KNEE

## 2018-10-22 ASSESSMENT — PAIN DESCRIPTION - DESCRIPTORS: DESCRIPTORS: ACHING

## 2018-10-22 ASSESSMENT — PAIN DESCRIPTION - ORIENTATION: ORIENTATION: RIGHT

## 2018-10-22 ASSESSMENT — PAIN SCALES - GENERAL: PAINLEVEL_OUTOF10: 5

## 2018-10-22 NOTE — PROGRESS NOTES
[address] [address]  Outpatient Physical Therapy    Treatment Note        Date: 10/22/2018  Patient: Medhat Woodall  : 1965  ACCT #: [de-identified]  Referring Practitioner: Dr Cristobal Marcum  Diagnosis: R knee DJD S/P TKR 18    Visit Information:  PT Visit Information  Onset Date: 18  PT Insurance Information: Medical [place]  Total # of Visits Approved:  (BMN)  Total # of Visits to Date: 2  No Show: 0  Canceled Appointment: 0  Progress Note Counter:     Subjective: Pt reports unable to wear a closed shoe due to increased swelling. Reports swelling into foot after being on feet more this weekend. Pt watched grandson this weekend for a little bit. Comments: RTD 2018  HEP Compliance:  [x] Good [] Fair [] Poor [] Reports not doing due to:    Vital Signs  Patient Currently in Pain: Yes   Pain Screening  Patient Currently in Pain: Yes  Pain Assessment  Pain Level: 5  Pain Type: Acute pain  Pain Location: Knee  Pain Orientation: Right  Pain Descriptors: Aching    OBJECTIVE:   Exercises  Exercise 1: nustep L 3 5 min  Exercise 2: sink ex's x 10 with minimal UE assist.   Exercise 3: heel slides with belt x 15, 5 sec  Exercise 4: LAQ 3s x 10  Exercise 5: bridge 5 x 2  Exercise 6: SLR 5 5 x 2  Exercise 7: Hip abd SL 2 x 10  Exercise 8: step up*  Exercise 9: rocker board small x 15 <->  Exercise 10: prone hang 2 min with education to perform at home,   Exercise 20: HEP; sink ex's, prone hang        Ambulation 1  Surface: carpet  Device: Rolling Walker  Assistance: Independent  Quality of Gait: Improving heel strike with verbal cues.    Distance: 48' x 2     Strength: [x] NT  [] MMT completed:     ROM: [x] NT  [] ROM measurements:     AROM RLE (degrees)  RLE General AROM: -10-94° knee         Manual:   Manual therapy  Joint mobilization: ant tib glide gr II/III  PROM: 8 min, flex ext  Soft Tissue Mobalization: post hamstring and medial/lat knee 5 min    Modalities:  Modalities  Cryotherapy

## 2018-10-24 ENCOUNTER — HOSPITAL ENCOUNTER (OUTPATIENT)
Dept: PHYSICAL THERAPY | Age: 53
Setting detail: THERAPIES SERIES
Discharge: HOME | End: 2018-10-24
Payer: COMMERCIAL

## 2018-10-24 PROCEDURE — 97140 MANUAL THERAPY 1/> REGIONS: CPT

## 2018-10-24 PROCEDURE — 97110 THERAPEUTIC EXERCISES: CPT

## 2018-10-24 ASSESSMENT — PAIN SCALES - GENERAL: PAINLEVEL_OUTOF10: 5

## 2018-10-24 ASSESSMENT — PAIN DESCRIPTION - DESCRIPTORS: DESCRIPTORS: ACHING

## 2018-10-24 ASSESSMENT — PAIN DESCRIPTION - FREQUENCY: FREQUENCY: CONTINUOUS

## 2018-10-24 ASSESSMENT — PAIN DESCRIPTION - PAIN TYPE: TYPE: ACUTE PAIN

## 2018-10-24 ASSESSMENT — PAIN DESCRIPTION - ORIENTATION: ORIENTATION: RIGHT

## 2018-10-24 ASSESSMENT — PAIN DESCRIPTION - LOCATION: LOCATION: KNEE

## 2018-10-26 ENCOUNTER — HOSPITAL ENCOUNTER (OUTPATIENT)
Dept: PHYSICAL THERAPY | Age: 53
Setting detail: THERAPIES SERIES
Discharge: HOME | End: 2018-10-26
Payer: COMMERCIAL

## 2018-10-26 PROCEDURE — 97110 THERAPEUTIC EXERCISES: CPT

## 2018-10-26 PROCEDURE — 97140 MANUAL THERAPY 1/> REGIONS: CPT

## 2018-10-26 ASSESSMENT — PAIN SCALES - GENERAL: PAINLEVEL_OUTOF10: 4

## 2018-10-26 ASSESSMENT — PAIN DESCRIPTION - LOCATION: LOCATION: KNEE

## 2018-10-26 ASSESSMENT — PAIN DESCRIPTION - PAIN TYPE: TYPE: ACUTE PAIN

## 2018-10-26 ASSESSMENT — PAIN DESCRIPTION - ORIENTATION: ORIENTATION: RIGHT

## 2018-10-26 ASSESSMENT — PAIN DESCRIPTION - FREQUENCY: FREQUENCY: CONTINUOUS

## 2018-10-26 ASSESSMENT — PAIN DESCRIPTION - DESCRIPTORS: DESCRIPTORS: ACHING;SORE

## 2018-10-26 NOTE — PROGRESS NOTES
[id number] [address]  Outpatient Physical Therapy    Treatment Note        Date: 10/26/2018  Patient: Lianna Wall  : 1965  ACCT #: [de-identified]  Referring Practitioner: Dr Leland Garcia  Diagnosis: R knee DJD S/P TKR 18    Visit Information:  PT Visit Information  Onset Date: 18  PT Insurance Information: Medical [place]  Total # of Visits Approved:  (BMN)  Total # of Visits to Date: 4  No Show: 0  Canceled Appointment: 0  Progress Note Counter:     Subjective: Pt reports that she longer has knee pain, \"now its just achey and sore. \"  Comments: RTD 2018  HEP Compliance:  [x] Good [] Fair [] Poor [] Reports not doing due to:    Vital Signs  Patient Currently in Pain: Yes   Pain Screening  Patient Currently in Pain: Yes  Pain Assessment  Pain Level: 4  Pain Type: Acute pain  Pain Location: Knee  Pain Orientation: Right  Pain Descriptors: Aching; Sore  Pain Frequency: Continuous  Pain Intervention(s): Cold applied;Medication (see eMar)    OBJECTIVE:   Exercises  Exercise 1: nustep L 3 5 min  Exercise 2: sink ex's x 10 with minimal UE assist.* (time contraint)  Exercise 3: heel slides with belt x 15, 5 sec  Exercise 4: LAQ 3s x 15  Exercise 5: bridge 7 x 2  Exercise 6: SLR 5 5 x 3 (VC to decrease quad lag)  Exercise 7: Hip abd SL 2 x 10  Exercise 8: step up*  Exercise 9: rocker board small x 15 <->* (time contraints)  Exercise 10: prone hang 4 min with education to perform at home,   Exercise 12: HS curl in sitting with OTB x10  Exercise 20: HEP continue current     Strength: [x] NT  [] MMT completed:         ROM: [] NT  [x] ROM measurements:     AROM RLE (degrees)  RLE General AROM: -8-96° knee (seated)  Manual:   Manual therapy  Joint mobilization: posterior/ ant tib glide gr II/III  PROM: flex ext  Soft Tissue Mobalization: post glutes, hamstring and medial/lat knee with roller stick  Other: 23 min    Modalities:  Modalities  Cryotherapy (Minutes\Location):  *  Other: declined

## 2018-10-29 ENCOUNTER — HOSPITAL ENCOUNTER (OUTPATIENT)
Dept: PHYSICAL THERAPY | Age: 53
Setting detail: THERAPIES SERIES
Discharge: HOME | End: 2018-10-29
Payer: COMMERCIAL

## 2018-10-29 PROCEDURE — 97140 MANUAL THERAPY 1/> REGIONS: CPT

## 2018-10-29 PROCEDURE — 97110 THERAPEUTIC EXERCISES: CPT

## 2018-10-29 ASSESSMENT — PAIN DESCRIPTION - ORIENTATION: ORIENTATION: RIGHT

## 2018-10-29 ASSESSMENT — PAIN DESCRIPTION - PAIN TYPE: TYPE: ACUTE PAIN

## 2018-10-29 ASSESSMENT — PAIN DESCRIPTION - FREQUENCY: FREQUENCY: CONTINUOUS

## 2018-10-29 ASSESSMENT — PAIN DESCRIPTION - LOCATION: LOCATION: KNEE

## 2018-10-29 ASSESSMENT — PAIN SCALES - GENERAL: PAINLEVEL_OUTOF10: 4

## 2018-10-29 ASSESSMENT — PAIN DESCRIPTION - DESCRIPTORS: DESCRIPTORS: ACHING

## 2018-10-29 NOTE — PROGRESS NOTES
[id number] [address]  Outpatient Physical Therapy    Treatment Note        Date: 10/29/2018  Patient: Maru Condon  : 1965  ACCT #: [de-identified]  Referring Practitioner: Dr Margot Salter  Diagnosis: R knee DJD S/P TKR 18    Visit Information:  PT Visit Information  Onset Date: 18  PT Insurance Information: Medical [place]  Total # of Visits Approved:  (BMN)  Total # of Visits to Date: 5  No Show: 0  Canceled Appointment: 0  Progress Note Counter:     Subjective: Pt reports no new complaints. \"I wish I could do more\"  Pt able to wear regular shoes this date  Comments: RTD 2018  HEP Compliance:  [] Good [x] Fair [] Poor [] Reports not doing due to:    Vital Signs  Patient Currently in Pain: Yes   Pain Screening  Patient Currently in Pain: Yes  Pain Assessment  Pain Level: 4  Pain Type: Acute pain  Pain Location: Knee  Pain Orientation: Right  Pain Descriptors: Aching  Pain Frequency: Continuous    OBJECTIVE:   Exercises  Exercise 1: nustep L 3 5 min  Exercise 2: sink ex's x 10 with minimal UE assist   Exercise 3: heel slides with belt x 10, 10 sec  Exercise 4: LAQ 3s x 15  Exercise 5: bridge 10 x 2  Exercise 6: SLR 5s, 5 x 3 (VC to decrease quad lag)  Exercise 7: Hip abd SL 2 x 10  Exercise 8: step up*  Exercise 9: rocker board small x 15 <->  Exercise 10: prone hang 4 min   Exercise 11: SAQ 3s hold  3 sets 5  Exercise 12: HS curl in sitting with OTB x10  Exercise 20: HEP continue current     Strength: [x] NT  [] MMT completed:     ROM: [] NT  [x] ROM measurements:  PROM RLE (degrees)  RLE General PROM: -5-103° knee supine      Manual:   Manual therapy  Joint mobilization: posterior/ ant tib glide gr II/III  PROM: flex ext  Soft Tissue Mobalization:  hamstring and medial/lat knee   Other: 20 min    *Indicates exercise, modality, or manual techniques to be initiated when appropriate    Assessment:         Body structures, Functions, Activity limitations: Decreased functional

## 2018-10-31 ENCOUNTER — HOSPITAL ENCOUNTER (OUTPATIENT)
Dept: PHYSICAL THERAPY | Age: 53
Setting detail: THERAPIES SERIES
Discharge: HOME | End: 2018-10-31
Payer: COMMERCIAL

## 2018-10-31 PROCEDURE — 97016 VASOPNEUMATIC DEVICE THERAPY: CPT

## 2018-10-31 PROCEDURE — 97110 THERAPEUTIC EXERCISES: CPT

## 2018-10-31 ASSESSMENT — PAIN SCALES - GENERAL: PAINLEVEL_OUTOF10: 7

## 2018-10-31 ASSESSMENT — PAIN DESCRIPTION - LOCATION: LOCATION: KNEE

## 2018-10-31 ASSESSMENT — PAIN DESCRIPTION - DESCRIPTORS: DESCRIPTORS: ACHING

## 2018-10-31 ASSESSMENT — PAIN DESCRIPTION - ORIENTATION: ORIENTATION: RIGHT

## 2018-10-31 ASSESSMENT — PAIN DESCRIPTION - PAIN TYPE: TYPE: ACUTE PAIN

## 2018-11-02 ENCOUNTER — HOSPITAL ENCOUNTER (OUTPATIENT)
Dept: PHYSICAL THERAPY | Age: 53
Setting detail: THERAPIES SERIES
Discharge: HOME | End: 2018-11-02
Payer: COMMERCIAL

## 2018-11-02 PROCEDURE — 97140 MANUAL THERAPY 1/> REGIONS: CPT

## 2018-11-02 PROCEDURE — 97110 THERAPEUTIC EXERCISES: CPT

## 2018-11-02 ASSESSMENT — PAIN DESCRIPTION - ORIENTATION: ORIENTATION: RIGHT

## 2018-11-02 ASSESSMENT — PAIN DESCRIPTION - DESCRIPTORS: DESCRIPTORS: ACHING

## 2018-11-02 ASSESSMENT — PAIN SCALES - GENERAL: PAINLEVEL_OUTOF10: 5

## 2018-11-02 ASSESSMENT — PAIN DESCRIPTION - LOCATION: LOCATION: KNEE

## 2018-11-02 NOTE — PROGRESS NOTES
[id number] [address]  Outpatient Physical Therapy    Treatment Note        Date: 2018  Patient: Lance Rose  : 1965  ACCT #: [de-identified]  Referring Practitioner: Dr Mell Graham  Diagnosis: R knee DJD S/P TKR 18    Visit Information:  PT Visit Information  Onset Date: 18  PT Insurance Information: Medical [place]  Total # of Visits Approved:  (BMN)  Total # of Visits to Date: 7  No Show: 0  Canceled Appointment: 0  Progress Note Counter:     Subjective: Pt reports decreased LBP this date. States improving but not 100%. Comments: RTD 2018  HEP Compliance:  [x] Good [] Fair [] Poor [] Reports not doing due to:    Vital Signs  Patient Currently in Pain: Yes   Pain Screening  Patient Currently in Pain: Yes  Pain Assessment  Pain Assessment: 0-10  Pain Level: 5  Pain Location: Knee  Pain Orientation: Right  Pain Descriptors: Aching    OBJECTIVE:   Exercises  Exercise 1: nustep L4 x 5 min  Exercise 3: heel slides with belt x 10, 10 sec  Exercise 5: bridge 10 x 2  Exercise 6: SLR 5s, 5 x 3 (VC to decrease quad lag)  Exercise 7: Hip abd SL 2 x 10  Exercise 9: rocker board small x 20 <->  Exercise 10: prone hang 4 min without towel roll  Exercise 11: SAQ 3s hold x15    ROM: [] NT  [x] ROM measurements:  AROM RLE (degrees)  RLE General AROM: 100 deg sitting     Manual:   Manual therapy  Joint mobilization: posterior/ ant tib glide gr II/III  PROM: flex ext  Soft Tissue Mobalization:  hamstring and medial/lat knee   Other: 12 min    Modalities:  Modalities  Other: declined - will ice at home     *Indicates exercise, modality, or manual techniques to be initiated when appropriate    Assessment: Body structures, Functions, Activity limitations: Decreased functional mobility , Decreased ADL status, Decreased ROM, Decreased strength  Assessment: No reports of inc LBP throughout tx. VC for breathing technique throughout with fair carryover.  Improved AROM flexion vs last

## 2018-11-05 ENCOUNTER — HOSPITAL ENCOUNTER (OUTPATIENT)
Dept: PHYSICAL THERAPY | Age: 53
Setting detail: THERAPIES SERIES
Discharge: HOME | End: 2018-11-05
Payer: COMMERCIAL

## 2018-11-07 ENCOUNTER — APPOINTMENT (OUTPATIENT)
Dept: PHYSICAL THERAPY | Age: 53
End: 2018-11-07
Payer: COMMERCIAL

## 2018-11-07 ENCOUNTER — HOSPITAL ENCOUNTER (OUTPATIENT)
Dept: GENERAL RADIOLOGY | Age: 53
Discharge: HOME | End: 2018-11-09
Payer: COMMERCIAL

## 2018-11-07 DIAGNOSIS — M25.561 RIGHT KNEE PAIN, UNSPECIFIED CHRONICITY: ICD-10-CM

## 2018-11-07 PROCEDURE — 73560 X-RAY EXAM OF KNEE 1 OR 2: CPT

## 2018-11-08 NOTE — PROGRESS NOTES
[address]       Physical Therapy  Cancellation/No-show Note  Patient Name:  Jayce Conklin  :  1965   Date:  2018  Referring Practitioner: Dr Aden Friend  Diagnosis: R knee DJD S/P TKR 18    Visit Information:  PT Visit Information  Onset Date: 18  PT Insurance Information: Medical [place]  Total # of Visits to Date: 7  No Show: 0  Canceled Appointment: 2  Progress Note Counter:  ( CX 18)    For today's appointment patient:  [x]  Cancelled  []  Rescheduled appointment  []  No-show   []  Called pt to remind of next appointment     Reason given by patient:  []  Patient ill  []  Conflicting appointment  []  No transportation    []  Conflict with work  []  No reason given  [x]  Other:  Can't make it in     Comments:       Signature: Electronically signed by Nydia Dai PTA on 18 at 1:25 PM

## 2018-11-09 ENCOUNTER — HOSPITAL ENCOUNTER (OUTPATIENT)
Dept: PHYSICAL THERAPY | Age: 53
Setting detail: THERAPIES SERIES
Discharge: HOME | End: 2018-11-09
Payer: COMMERCIAL

## 2018-11-12 ENCOUNTER — HOSPITAL ENCOUNTER (OUTPATIENT)
Dept: PHYSICAL THERAPY | Age: 53
Setting detail: THERAPIES SERIES
Discharge: HOME | End: 2018-11-12
Payer: COMMERCIAL

## 2018-11-12 NOTE — PROGRESS NOTES
[address]     Ph: [phone]  Fax: [phone]    [] Certification  [] Recertification []  Plan of Care  [] Progress Note [x] Discharge      To:  Referring Practitioner: Dr Roxann Farias      From:  Marian Sahni, PT  Patient: Janie Myers     : 1965  Diagnosis: R knee DJD S/P TKR 18     Date: 2018  Treatment Diagnosis: R knee pain       Progress Report Period from:  10/16/2018  to 2018    Total # of Visits to Date: 7   No Show: 0    Canceled Appointment: 0     OBJECTIVE:   Short Term Goals - Time Frame for Short term goals: 4 weeks    Goals Current/Discharge status  Met   Short term goal 1: Indep HEP  indep with HEP [] yes  [x] no   Short term goal 2: Decrease R knee pain to 0-1/10 to enable functional improvements in ADL's. Pain level -5-6/10 on 18, no recent info due to unexpected D/C [] yes  [x] no   Short term goal 3: Pain-free R knee AROM to 0-105° allowing an increase in ADL tolerance. Knee flexion 100 deg, seated, on 18, no other measures taken due to unexpected D/C [] yes  [x] no   Short term goal 4: Improve R LE strength 4-/5 to 4/5 throughout to allow patient to report greater than 85% normal function per functional survey score. NT due to unexpected D/C [] yes  [x] no   Short term goal 5: Ambulate with least restrictive device safe/Indep community distances with min to no deviations >/+ 25 min tolerance NT due to unexpected D/C []  yes  [x]  no      Pt called dept requesting DC. No return call to dept after inquiry for unexpected DC. Patient Status:[] Continue/ Initiate plan of Care    [x] Discharge PT. Recommend pt continue with HEP.      [] Additional visits requested, Please re-certify for additional visits:          Signature: objective info byElectronically signed by Kiesha Flynn PTA on 18 at 3:31 PM  Electronically signed by

## 2018-11-14 ENCOUNTER — APPOINTMENT (OUTPATIENT)
Dept: PHYSICAL THERAPY | Age: 53
End: 2018-11-14
Payer: COMMERCIAL

## 2018-11-16 ENCOUNTER — APPOINTMENT (OUTPATIENT)
Dept: PHYSICAL THERAPY | Age: 53
End: 2018-11-16
Payer: COMMERCIAL

## 2018-11-29 ENCOUNTER — HOSPITAL ENCOUNTER (OUTPATIENT)
Dept: GENERAL RADIOLOGY | Age: 53
Discharge: HOME | End: 2018-12-01
Payer: COMMERCIAL

## 2018-11-29 DIAGNOSIS — M51.36 LUMBAR DEGENERATIVE DISC DISEASE: ICD-10-CM

## 2018-11-29 DIAGNOSIS — M54.5 LOW BACK PAIN, UNSPECIFIED BACK PAIN LATERALITY, UNSPECIFIED CHRONICITY, WITH SCIATICA PRESENCE UNSPECIFIED: ICD-10-CM

## 2018-11-29 PROCEDURE — 72100 X-RAY EXAM L-S SPINE 2/3 VWS: CPT

## 2018-12-05 ENCOUNTER — HOSPITAL ENCOUNTER (OUTPATIENT)
Dept: GENERAL RADIOLOGY | Age: 53
Discharge: HOME | End: 2018-12-07
Payer: COMMERCIAL

## 2018-12-05 DIAGNOSIS — M17.11 OSTEOARTHRITIS OF RIGHT KNEE, UNSPECIFIED OSTEOARTHRITIS TYPE: ICD-10-CM

## 2018-12-05 PROCEDURE — 73560 X-RAY EXAM OF KNEE 1 OR 2: CPT

## 2019-01-28 RX ORDER — GABAPENTIN 100 MG/1
CAPSULE ORAL
Qty: 120 CAPSULE | Refills: 2 | Status: SHIPPED | OUTPATIENT
Start: 2019-01-28 | End: 2019-02-04 | Stop reason: SDUPTHER

## 2019-02-04 ENCOUNTER — OFFICE VISIT (OUTPATIENT)
Dept: PRIMARY CARE CLINIC | Age: 54
End: 2019-02-04
Payer: COMMERCIAL

## 2019-02-04 VITALS
OXYGEN SATURATION: 99 % | BODY MASS INDEX: 40.98 KG/M2 | WEIGHT: 255 LBS | SYSTOLIC BLOOD PRESSURE: 126 MMHG | HEIGHT: 66 IN | TEMPERATURE: 98.1 F | RESPIRATION RATE: 16 BRPM | HEART RATE: 76 BPM | DIASTOLIC BLOOD PRESSURE: 80 MMHG

## 2019-02-04 DIAGNOSIS — M26.629 ARTHRALGIA OF TEMPOROMANDIBULAR JOINT, UNSPECIFIED LATERALITY: ICD-10-CM

## 2019-02-04 DIAGNOSIS — R20.0 NUMBNESS AND TINGLING IN BOTH HANDS: ICD-10-CM

## 2019-02-04 DIAGNOSIS — Z12.11 SCREEN FOR COLON CANCER: ICD-10-CM

## 2019-02-04 DIAGNOSIS — R20.2 NUMBNESS AND TINGLING IN BOTH HANDS: ICD-10-CM

## 2019-02-04 DIAGNOSIS — I10 ESSENTIAL HYPERTENSION: Primary | ICD-10-CM

## 2019-02-04 PROCEDURE — 3017F COLORECTAL CA SCREEN DOC REV: CPT | Performed by: FAMILY MEDICINE

## 2019-02-04 PROCEDURE — G8427 DOCREV CUR MEDS BY ELIG CLIN: HCPCS | Performed by: FAMILY MEDICINE

## 2019-02-04 PROCEDURE — G8484 FLU IMMUNIZE NO ADMIN: HCPCS | Performed by: FAMILY MEDICINE

## 2019-02-04 PROCEDURE — 99214 OFFICE O/P EST MOD 30 MIN: CPT | Performed by: FAMILY MEDICINE

## 2019-02-04 PROCEDURE — G8417 CALC BMI ABV UP PARAM F/U: HCPCS | Performed by: FAMILY MEDICINE

## 2019-02-04 PROCEDURE — 1036F TOBACCO NON-USER: CPT | Performed by: FAMILY MEDICINE

## 2019-02-04 RX ORDER — CARVEDILOL 12.5 MG/1
12.5 TABLET ORAL 2 TIMES DAILY
Qty: 60 TABLET | Refills: 11 | Status: SHIPPED | OUTPATIENT
Start: 2019-02-04

## 2019-02-04 RX ORDER — METHOCARBAMOL 500 MG/1
500 TABLET, FILM COATED ORAL EVERY EVENING
Qty: 10 TABLET | Refills: 0 | Status: SHIPPED | OUTPATIENT
Start: 2019-02-04 | End: 2019-02-14

## 2019-02-04 RX ORDER — IBUPROFEN 800 MG/1
800 TABLET ORAL 2 TIMES DAILY WITH MEALS
Qty: 60 TABLET | Refills: 5 | Status: SHIPPED | OUTPATIENT
Start: 2019-02-04

## 2019-02-04 RX ORDER — CARVEDILOL 12.5 MG/1
12.5 TABLET ORAL 2 TIMES DAILY
Qty: 60 TABLET | Refills: 11 | Status: SHIPPED | OUTPATIENT
Start: 2019-02-04 | End: 2019-02-04 | Stop reason: SDUPTHER

## 2019-02-04 RX ORDER — METHOCARBAMOL 500 MG/1
500 TABLET, FILM COATED ORAL EVERY EVENING
Qty: 10 TABLET | Refills: 0 | Status: SHIPPED | OUTPATIENT
Start: 2019-02-04 | End: 2019-02-04 | Stop reason: SDUPTHER

## 2019-02-04 RX ORDER — GABAPENTIN 100 MG/1
CAPSULE ORAL
Qty: 120 CAPSULE | Refills: 2 | Status: SHIPPED | OUTPATIENT
Start: 2019-02-04 | End: 2019-03-04

## 2019-02-04 RX ORDER — CYCLOBENZAPRINE HCL 10 MG
10 TABLET ORAL 3 TIMES DAILY PRN
Qty: 60 TABLET | Refills: 3 | Status: CANCELLED | OUTPATIENT
Start: 2019-02-04

## 2019-02-04 RX ORDER — ASPIRIN 81 MG/1
81 TABLET ORAL 2 TIMES DAILY
Qty: 30 TABLET | Refills: 0 | Status: CANCELLED | OUTPATIENT
Start: 2019-02-04 | End: 2019-03-06

## 2019-02-04 ASSESSMENT — ENCOUNTER SYMPTOMS
FACIAL SWELLING: 0
DIARRHEA: 0
PHOTOPHOBIA: 0
WHEEZING: 0
EYE REDNESS: 0
CONSTIPATION: 0
EYE PAIN: 0
STRIDOR: 0
NAUSEA: 0
EYE DISCHARGE: 0
APNEA: 0
ABDOMINAL PAIN: 0
COLOR CHANGE: 0
ORTHOPNEA: 0
CHOKING: 0
BLURRED VISION: 0
SHORTNESS OF BREATH: 0
CHEST TIGHTNESS: 0

## 2019-02-13 ENCOUNTER — PATIENT MESSAGE (OUTPATIENT)
Dept: PRIMARY CARE CLINIC | Age: 54
End: 2019-02-13

## 2019-03-06 ENCOUNTER — HOSPITAL ENCOUNTER (OUTPATIENT)
Dept: GENERAL RADIOLOGY | Age: 54
Discharge: HOME | End: 2019-03-08
Payer: COMMERCIAL

## 2019-03-06 DIAGNOSIS — M17.11 OSTEOARTHRITIS OF RIGHT KNEE, UNSPECIFIED OSTEOARTHRITIS TYPE: ICD-10-CM

## 2019-03-06 PROCEDURE — 73560 X-RAY EXAM OF KNEE 1 OR 2: CPT

## 2019-03-19 ENCOUNTER — OFFICE VISIT (OUTPATIENT)
Dept: FAMILY MEDICINE CLINIC | Age: 54
End: 2019-03-19
Payer: COMMERCIAL

## 2019-03-19 VITALS
SYSTOLIC BLOOD PRESSURE: 126 MMHG | BODY MASS INDEX: 42.01 KG/M2 | WEIGHT: 261.4 LBS | OXYGEN SATURATION: 97 % | TEMPERATURE: 98.2 F | HEIGHT: 66 IN | HEART RATE: 77 BPM | DIASTOLIC BLOOD PRESSURE: 72 MMHG

## 2019-03-19 DIAGNOSIS — J01.40 ACUTE PANSINUSITIS, RECURRENCE NOT SPECIFIED: Primary | ICD-10-CM

## 2019-03-19 PROCEDURE — G8417 CALC BMI ABV UP PARAM F/U: HCPCS | Performed by: NURSE PRACTITIONER

## 2019-03-19 PROCEDURE — G8484 FLU IMMUNIZE NO ADMIN: HCPCS | Performed by: NURSE PRACTITIONER

## 2019-03-19 PROCEDURE — 1036F TOBACCO NON-USER: CPT | Performed by: NURSE PRACTITIONER

## 2019-03-19 PROCEDURE — G8427 DOCREV CUR MEDS BY ELIG CLIN: HCPCS | Performed by: NURSE PRACTITIONER

## 2019-03-19 PROCEDURE — 3017F COLORECTAL CA SCREEN DOC REV: CPT | Performed by: NURSE PRACTITIONER

## 2019-03-19 PROCEDURE — 99213 OFFICE O/P EST LOW 20 MIN: CPT | Performed by: NURSE PRACTITIONER

## 2019-03-19 RX ORDER — FLUTICASONE PROPIONATE 50 MCG
2 SPRAY, SUSPENSION (ML) NASAL DAILY
Qty: 1 BOTTLE | Refills: 0 | Status: SHIPPED | OUTPATIENT
Start: 2019-03-19 | End: 2019-04-02

## 2019-03-19 RX ORDER — AMOXICILLIN AND CLAVULANATE POTASSIUM 875; 125 MG/1; MG/1
1 TABLET, FILM COATED ORAL 2 TIMES DAILY
Qty: 20 TABLET | Refills: 0 | Status: SHIPPED | OUTPATIENT
Start: 2019-03-19 | End: 2019-03-22 | Stop reason: SINTOL

## 2019-03-19 RX ORDER — GABAPENTIN 100 MG/1
100 CAPSULE ORAL 3 TIMES DAILY
COMMUNITY

## 2019-03-19 ASSESSMENT — ENCOUNTER SYMPTOMS
CHEST TIGHTNESS: 0
SHORTNESS OF BREATH: 0
SINUS PRESSURE: 1
FACIAL SWELLING: 0
SINUS PAIN: 1
COUGH: 0
RHINORRHEA: 0
SORE THROAT: 0
WHEEZING: 0
ABDOMINAL PAIN: 0
VOMITING: 0
DIARRHEA: 0

## 2019-03-19 ASSESSMENT — PATIENT HEALTH QUESTIONNAIRE - PHQ9
SUM OF ALL RESPONSES TO PHQ QUESTIONS 1-9: 0
1. LITTLE INTEREST OR PLEASURE IN DOING THINGS: 0
SUM OF ALL RESPONSES TO PHQ9 QUESTIONS 1 & 2: 0
SUM OF ALL RESPONSES TO PHQ QUESTIONS 1-9: 0
2. FEELING DOWN, DEPRESSED OR HOPELESS: 0

## 2019-03-22 RX ORDER — AMOXICILLIN 875 MG/1
875 TABLET, COATED ORAL 2 TIMES DAILY
Qty: 20 TABLET | Refills: 0 | Status: SHIPPED | OUTPATIENT
Start: 2019-03-22 | End: 2019-04-01

## 2019-03-28 ENCOUNTER — NURSE ONLY (OUTPATIENT)
Dept: PRIMARY CARE CLINIC | Age: 54
End: 2019-03-28

## 2019-03-28 RX ORDER — TRIAMCINOLONE ACETONIDE 40 MG/ML
40 INJECTION, SUSPENSION INTRA-ARTICULAR; INTRAMUSCULAR ONCE
Status: COMPLETED | OUTPATIENT
Start: 2019-03-28 | End: 2019-03-28

## 2019-03-28 RX ADMIN — TRIAMCINOLONE ACETONIDE 40 MG: 40 INJECTION, SUSPENSION INTRA-ARTICULAR; INTRAMUSCULAR at 10:52

## 2020-03-25 PROBLEM — M47.26 OSTEOARTHRITIS OF SPINE WITH RADICULOPATHY, LUMBAR REGION: Status: RESOLVED | Noted: 2017-06-06 | Resolved: 2020-03-24

## 2023-02-28 LAB — SARS-COV-2 RESULT: NOT DETECTED

## 2023-03-06 ENCOUNTER — TELEPHONE (OUTPATIENT)
Dept: PRIMARY CARE | Facility: CLINIC | Age: 58
End: 2023-03-06
Payer: COMMERCIAL

## 2023-03-06 DIAGNOSIS — J32.9 SINUSITIS, UNSPECIFIED CHRONICITY, UNSPECIFIED LOCATION: ICD-10-CM

## 2023-03-06 RX ORDER — AZITHROMYCIN 250 MG/1
TABLET, FILM COATED ORAL
Qty: 6 TABLET | Refills: 0 | Status: SHIPPED | OUTPATIENT
Start: 2023-03-06 | End: 2023-03-11

## 2023-03-06 NOTE — TELEPHONE ENCOUNTER
Pt was given a rx for Ceftin for her cold/ URI. She states it's not working. Wanted to know if you could send in a Zpack instead?  Please advise  Thanks      Preferred pharmacy [facility]

## 2023-03-07 LAB — SARS-COV-2 RESULT: DETECTED

## 2023-05-16 ENCOUNTER — APPOINTMENT (OUTPATIENT)
Dept: PRIMARY CARE | Facility: CLINIC | Age: 58
End: 2023-05-16
Payer: COMMERCIAL

## 2023-05-17 DIAGNOSIS — I10 HYPERTENSION, UNSPECIFIED TYPE: ICD-10-CM

## 2023-05-17 RX ORDER — CARVEDILOL 12.5 MG/1
12.5 TABLET ORAL 2 TIMES DAILY
COMMUNITY
End: 2023-05-17 | Stop reason: SDUPTHER

## 2023-05-17 RX ORDER — CARVEDILOL 12.5 MG/1
12.5 TABLET ORAL 2 TIMES DAILY
Qty: 90 TABLET | Refills: 1 | Status: SHIPPED | OUTPATIENT
Start: 2023-05-17 | End: 2023-05-22 | Stop reason: SDUPTHER

## 2023-05-17 NOTE — TELEPHONE ENCOUNTER
PATIENT STATES THAT SHE SPOKE TO YOU ABOUT REFILLING HER MEDICATION.    SHE IS OUT OF HER     CARVEDILOL 12.5 MG TAKE 1 TWICE DAILY    WOULD LIKE IT SENT TO [facility]

## 2023-05-22 ENCOUNTER — APPOINTMENT (OUTPATIENT)
Dept: PRIMARY CARE | Facility: CLINIC | Age: 58
End: 2023-05-22
Payer: COMMERCIAL

## 2023-05-22 ENCOUNTER — OFFICE VISIT (OUTPATIENT)
Dept: PRIMARY CARE | Facility: CLINIC | Age: 58
End: 2023-05-22
Payer: COMMERCIAL

## 2023-05-22 VITALS
DIASTOLIC BLOOD PRESSURE: 62 MMHG | SYSTOLIC BLOOD PRESSURE: 110 MMHG | HEART RATE: 77 BPM | OXYGEN SATURATION: 97 % | RESPIRATION RATE: 14 BRPM | HEIGHT: 66 IN | BODY MASS INDEX: 47.09 KG/M2 | WEIGHT: 293 LBS

## 2023-05-22 DIAGNOSIS — I10 HYPERTENSION, UNSPECIFIED TYPE: ICD-10-CM

## 2023-05-22 DIAGNOSIS — M19.90 ARTHRITIS: Primary | ICD-10-CM

## 2023-05-22 DIAGNOSIS — E66.01 CLASS 3 SEVERE OBESITY DUE TO EXCESS CALORIES WITH SERIOUS COMORBIDITY AND BODY MASS INDEX (BMI) OF 45.0 TO 49.9 IN ADULT (MULTI): ICD-10-CM

## 2023-05-22 DIAGNOSIS — R32 URINARY INCONTINENCE, UNSPECIFIED TYPE: ICD-10-CM

## 2023-05-22 PROBLEM — M43.10 DEGENERATIVE SPONDYLOLISTHESIS: Status: ACTIVE | Noted: 2017-06-06

## 2023-05-22 PROBLEM — R53.83 FATIGUE: Status: ACTIVE | Noted: 2023-05-22

## 2023-05-22 PROBLEM — M79.2 NERVE PAIN: Status: ACTIVE | Noted: 2023-05-22

## 2023-05-22 PROBLEM — M54.16 LUMBAR RADICULOPATHY: Status: ACTIVE | Noted: 2023-05-22

## 2023-05-22 PROBLEM — E66.813 CLASS 3 SEVERE OBESITY DUE TO EXCESS CALORIES WITH SERIOUS COMORBIDITY AND BODY MASS INDEX (BMI) OF 45.0 TO 49.9 IN ADULT: Status: ACTIVE | Noted: 2023-05-22

## 2023-05-22 PROBLEM — N39.41 URGE INCONTINENCE: Status: ACTIVE | Noted: 2023-05-22

## 2023-05-22 PROBLEM — M47.26 OSTEOARTHRITIS OF SPINE WITH RADICULOPATHY, LUMBAR REGION: Status: ACTIVE | Noted: 2017-06-27

## 2023-05-22 PROBLEM — Z98.1 HISTORY OF LUMBAR SPINAL FUSION: Status: ACTIVE | Noted: 2023-05-22

## 2023-05-22 PROCEDURE — 99214 OFFICE O/P EST MOD 30 MIN: CPT | Performed by: FAMILY MEDICINE

## 2023-05-22 PROCEDURE — 3078F DIAST BP <80 MM HG: CPT | Performed by: FAMILY MEDICINE

## 2023-05-22 PROCEDURE — 3074F SYST BP LT 130 MM HG: CPT | Performed by: FAMILY MEDICINE

## 2023-05-22 PROCEDURE — 3008F BODY MASS INDEX DOCD: CPT | Performed by: FAMILY MEDICINE

## 2023-05-22 PROCEDURE — 1036F TOBACCO NON-USER: CPT | Performed by: FAMILY MEDICINE

## 2023-05-22 RX ORDER — GABAPENTIN 300 MG/1
300 CAPSULE ORAL NIGHTLY
COMMUNITY
End: 2023-05-23 | Stop reason: SDUPTHER

## 2023-05-22 RX ORDER — SPIRONOLACTONE 25 MG/1
25 TABLET ORAL DAILY
COMMUNITY
Start: 2023-01-24 | End: 2023-05-22 | Stop reason: SDUPTHER

## 2023-05-22 RX ORDER — OXYBUTYNIN CHLORIDE 10 MG/1
10 TABLET, EXTENDED RELEASE ORAL DAILY
Qty: 30 TABLET | Refills: 1 | Status: SHIPPED | OUTPATIENT
Start: 2023-05-22 | End: 2023-07-21

## 2023-05-22 RX ORDER — IBUPROFEN 800 MG/1
800 TABLET ORAL 2 TIMES DAILY PRN
Qty: 180 TABLET | Refills: 1 | Status: SHIPPED | OUTPATIENT
Start: 2023-05-22 | End: 2023-11-18

## 2023-05-22 RX ORDER — CYCLOBENZAPRINE HCL 10 MG
10 TABLET ORAL 3 TIMES DAILY PRN
COMMUNITY
End: 2023-05-23 | Stop reason: SDUPTHER

## 2023-05-22 RX ORDER — CARVEDILOL 12.5 MG/1
12.5 TABLET ORAL 2 TIMES DAILY
Qty: 90 TABLET | Refills: 1 | Status: SHIPPED | OUTPATIENT
Start: 2023-05-22 | End: 2023-08-29 | Stop reason: SDUPTHER

## 2023-05-22 RX ORDER — PREDNISONE 5 MG/1
5 TABLET ORAL DAILY PRN
Qty: 30 TABLET | Refills: 0 | Status: SHIPPED | OUTPATIENT
Start: 2023-05-22 | End: 2023-06-21

## 2023-05-22 RX ORDER — SPIRONOLACTONE 25 MG/1
25 TABLET ORAL DAILY
Qty: 90 TABLET | Refills: 1 | Status: SHIPPED | OUTPATIENT
Start: 2023-05-22 | End: 2023-11-18

## 2023-05-22 RX ORDER — CARVEDILOL 12.5 MG/1
12.5 TABLET ORAL 2 TIMES DAILY
COMMUNITY
Start: 2019-02-04 | End: 2023-05-22 | Stop reason: ALTCHOICE

## 2023-05-22 RX ORDER — IBUPROFEN 800 MG/1
800 TABLET ORAL 2 TIMES DAILY
COMMUNITY
Start: 2020-03-23 | End: 2023-05-22 | Stop reason: SDUPTHER

## 2023-05-22 ASSESSMENT — ENCOUNTER SYMPTOMS
HEMATOLOGIC/LYMPHATIC NEGATIVE: 1
CONSTITUTIONAL NEGATIVE: 1
ALLERGIC/IMMUNOLOGIC NEGATIVE: 1
GASTROINTESTINAL NEGATIVE: 1
EYES NEGATIVE: 1
CARDIOVASCULAR NEGATIVE: 1
RESPIRATORY NEGATIVE: 1
MUSCULOSKELETAL NEGATIVE: 1
PAIN: 1
ENDOCRINE NEGATIVE: 1
NEUROLOGICAL NEGATIVE: 1
PSYCHIATRIC NEGATIVE: 1

## 2023-05-22 NOTE — PROGRESS NOTES
"Subjective   Patient ID: Lauren Coon is a 57 y.o. female who presents for No chief complaint on file..    Pain  This is a recurrent problem. The current episode started more than 1 year ago. The problem occurs constantly. The problem is unchanged. The pain is present in the left hand, left shoulder, right shoulder and right hand. The pain is medium.       Pt would like to discuss urinary incontinence.She would like to discuss Toviaz.    Review of Systems   Constitutional: Negative.    HENT: Negative.     Eyes: Negative.    Respiratory: Negative.     Cardiovascular: Negative.    Gastrointestinal: Negative.    Endocrine: Negative.    Genitourinary: Negative.    Musculoskeletal: Negative.    Skin: Negative.    Allergic/Immunologic: Negative.    Neurological: Negative.    Hematological: Negative.    Psychiatric/Behavioral: Negative.       Objective   /62 (BP Location: Left arm, Patient Position: Sitting, BP Cuff Size: Adult)   Pulse 77   Resp 14   Ht 1.676 m (5' 6\")   Wt 136 kg (299 lb)   SpO2 97%   BMI 48.26 kg/m²     Physical Exam  CONSTITUTIONAL- NAD, Pt is A & O x3, Vital signs reviewed per chart.   General Appearance- normal , good hygiene,talks easily   EYES-PERRLA conjunctiva and lids- normal   EARS/NOSE-TM's normal, head normocephalic and atraumatic, naso pharynx-no nasal discharge, no trismus,   oropharynx- normal without exudate   NECK- supple, FROM, without mass   thyroid- NT, normal size, no nodule noted   LYMPH- WNL   CV- RRR without murmur, gallop, or other abnormality.   PULM- CTA bilaterally   Respiratory effort- normal respiratory effort , no retractions or nasal flaring   SKIN- no abnormal skin lesions on inspection or palpation   neuro- no focal deficits   PSYCH- pleasant, normal judgement and insight     Assessment/Plan   Problem List Items Addressed This Visit       Arthritis - Primary    Relevant Medications    predniSONE (Deltasone) 5 mg tablet    ibuprofen 800 mg tablet    " Hypertension    Relevant Medications    carvedilol (Coreg) 12.5 mg tablet    spironolactone (Aldactone) 25 mg tablet    BMI 45.0-49.9, adult (CMS/[facility], located within [facility])    Class 3 severe obesity due to excess calories with serious comorbidity and body mass index (BMI) of 45.0 to 49.9 in adult (CMS/[facility], located within [facility])     Other Visit Diagnoses       Urinary incontinence, unspecified type        Relevant Medications    oxybutynin XL (Ditropan-XL) 10 mg 24 hr tablet          Patient was advised importance of proper diet/nutrition in addition adequate hydration. Patient was encouraged moderate exercise program to include 30 minutes daily for 5 days of the week or 150 minutes weekly. Patient will follow-up with us as scheduled.     Begin taking Prednisone 5 mg daily PRN sparingly.    Begin taking Oxybutynin XL 10 mg.     continue all current medications and therapy for chronic medical conditions     Scribe Attestation  By signing my name below, IClemente RMA, Scribe   attest that this documentation has been prepared under the direction and in the presence of Alvin Dutton DO.     Provider Attestation - Scribe documentation    All medical record entries made by the Scribe were at my direction and personally dictated by me. I have reviewed the chart and agree that the record accurately reflects my personal performance of the history, physical exam, discussion and plan.

## 2023-05-23 DIAGNOSIS — M54.16 LUMBAR RADICULOPATHY: ICD-10-CM

## 2023-05-23 DIAGNOSIS — M79.2 NERVE PAIN: ICD-10-CM

## 2023-05-23 RX ORDER — CYCLOBENZAPRINE HCL 10 MG
10 TABLET ORAL 3 TIMES DAILY PRN
Qty: 270 TABLET | Refills: 1 | Status: SHIPPED | OUTPATIENT
Start: 2023-05-23

## 2023-05-23 RX ORDER — GABAPENTIN 300 MG/1
CAPSULE ORAL
Qty: 180 CAPSULE | Refills: 1 | Status: SHIPPED | OUTPATIENT
Start: 2023-05-23 | End: 2023-08-29 | Stop reason: SDUPTHER

## 2023-06-20 ENCOUNTER — TELEMEDICINE (OUTPATIENT)
Dept: PRIMARY CARE | Facility: CLINIC | Age: 58
End: 2023-06-20
Payer: COMMERCIAL

## 2023-06-20 DIAGNOSIS — N89.8 VAGINAL ITCHING: ICD-10-CM

## 2023-06-20 DIAGNOSIS — J34.89 NASAL CONGESTION WITH RHINORRHEA: ICD-10-CM

## 2023-06-20 DIAGNOSIS — N89.8 VAGINAL IRRITATION: ICD-10-CM

## 2023-06-20 DIAGNOSIS — R09.81 NASAL CONGESTION WITH RHINORRHEA: ICD-10-CM

## 2023-06-20 DIAGNOSIS — R05.9 COUGH IN ADULT PATIENT: ICD-10-CM

## 2023-06-20 DIAGNOSIS — B37.9 YEAST INFECTION: ICD-10-CM

## 2023-06-20 DIAGNOSIS — J00 NASOPHARYNGITIS: Primary | ICD-10-CM

## 2023-06-20 PROCEDURE — 99213 OFFICE O/P EST LOW 20 MIN: CPT | Performed by: NURSE PRACTITIONER

## 2023-06-20 RX ORDER — FLUCONAZOLE 150 MG/1
150 TABLET ORAL
Qty: 2 TABLET | Refills: 0 | Status: SHIPPED | OUTPATIENT
Start: 2023-06-20 | End: 2023-08-29 | Stop reason: ALTCHOICE

## 2023-06-20 RX ORDER — AMOXICILLIN 500 MG/1
500 CAPSULE ORAL EVERY 8 HOURS SCHEDULED
Qty: 21 CAPSULE | Refills: 0 | Status: SHIPPED | OUTPATIENT
Start: 2023-06-20 | End: 2023-06-27

## 2023-06-20 ASSESSMENT — ENCOUNTER SYMPTOMS
SINUS PRESSURE: 1
HEADACHES: 1
VOMITING: 1
RHINORRHEA: 1
DIARRHEA: 1
DIZZINESS: 1
SWOLLEN GLANDS: 1
COUGH: 1
SORE THROAT: 1
NAUSEA: 1

## 2023-06-20 NOTE — PROGRESS NOTES
Subjective   Patient ID: Lauren Coon is a 57 y.o. female who presents for Headache, Vomiting, Diarrhea, and Sore Throat.    Headache   This is a new problem. The current episode started in the past 7 days. The problem occurs constantly. The pain is located in the Frontal region. The pain quality is not similar to prior headaches. The quality of the pain is described as pulsating. Associated symptoms include coughing, dizziness, drainage, nausea, rhinorrhea, sinus pressure, a sore throat, swollen glands and vomiting. Nothing aggravates the symptoms. She has tried oral narcotics, Excedrin and NSAIDs for the symptoms. The treatment provided no relief.   Vomiting   Associated symptoms include coughing, diarrhea, dizziness and headaches.   Diarrhea   This is a new problem. The current episode started in the past 7 days. The problem has been gradually improving. The patient states that diarrhea does not awaken her from sleep. Associated symptoms include coughing, headaches and vomiting. Nothing aggravates the symptoms. She has tried bismuth subsalicylate and analgesics for the symptoms. The treatment provided mild relief.   Sore Throat   This is a new problem. The current episode started 1 to 4 weeks ago. The problem has been unchanged. There has been no fever. The pain is at a severity of 3/10. The pain is mild. Associated symptoms include coughing, diarrhea, headaches, swollen glands and vomiting. She has tried NSAIDs and acetaminophen for the symptoms. The treatment provided mild relief.        Review of Systems   HENT:  Positive for rhinorrhea, sinus pressure and sore throat.    Respiratory:  Positive for cough.    Gastrointestinal:  Positive for diarrhea, nausea and vomiting.   Neurological:  Positive for dizziness and headaches.       Objective   There were no vitals taken for this visit.    Physical Exam    Assessment/Plan

## 2023-06-20 NOTE — PROGRESS NOTES
Subjective   Patient ID: Lauren Coon is a 57 y.o. female who is with a chief complaint of symptoms of respiratory tract infection and symptoms of yeast infection.    HPI  Patient is a 57 y.o. female who CONSULTED AT [facility] - PHONE ONLY today. Patient is with complaints of nasal congestion, nasal discharge, headache / sinus pain, sore throat, slight cough, hoarseness, post nasal drip, fatigue, muscle ache, intermittent diarrhea, intermittent nausea and vomiting. She denies having loss of sense of taste nor loss of sense of smell. Patient states that present condition started about 7 days ago after being exposed to her grandkids who might be having URI. she denies shortness of breath, chest pain, palpitations, nor edema. she stated that she  tried OTC medications which afforded only slight relief of symptoms.     Patient states she had her COVID vaccine.  Patient states she had the flu shot for this season.    Patient also with complaints of vaginal itching and irritation which started 2 days ago. This was accompanied by whitish cheese-like vaginal discharge. She states she had yeast infections before and these were the same symptoms. She denies any urinary complaints. She denies any fever nor chills.    Review of Systems    General: no weight loss, generally healthy, (+) fatigue  Head: (+) headaches / sinus pain, no vertigo, no injury  Eyes: no diplopia, no tearing, no pain,   Ears: no change in hearing, no tinnitus, no bleeding, no vertigo  Mouth:  no dental difficulties, no gingival bleeding, (+) sore throat, no loss of sense of taste, (+) hoarseness, (+) post nasal drip  Nose: (+) congestion, (+) discharge, no bleeding, no obstruction, no loss of sense of smell  Neck: no stiffness, no pain, no tenderness, no masses, no bruit  Pulmonary: no dyspnea, no wheezing, no hemoptysis, (+) cough  Cardiovascular: no chest pain, no palpitations, no syncope, no orthopnea  Gastrointestinal: no  change in appetite, no dysphagia, no abdominal pains, (+) intermittent diarrhea, (+) intermittent nausea, (+) intermittent emesis, no melena  Genito Urinary: no dysuria, no urinary urgency, no nocturia, no incontinence, no change in nature of urine, (+) vaginal irritation, (+) vaginal itching, (+) discharge  Musculoskeletal: (+) muscle ache, no joint pain, no limitation of range of motion, no paresthesia, no numbness  Constitutional: no fever, no chills, no night sweats    Objective   NO VITAL SIGNS TAKEN FOR THIS PATIENT (VIRTUAL VISIT CONSULT - PHONE ONLY)    Physical Exam  PHYSICAL EXAMINATION WAS NOT DONE FOR THIS PATIENT (VIRTUAL VISIT CONSULT - PHONE ONLY)    Assessment/Plan   Problem List Items Addressed This Visit    None  Visit Diagnoses       Nasopharyngitis    -  Primary    Relevant Medications    amoxicillin (Amoxil) 500 mg capsule    Cough in adult patient        Relevant Medications    amoxicillin (Amoxil) 500 mg capsule    Nasal congestion with rhinorrhea        Relevant Medications    amoxicillin (Amoxil) 500 mg capsule    Yeast infection        Relevant Medications    fluconazole (Diflucan) 150 mg tablet    Vaginal irritation        Relevant Medications    fluconazole (Diflucan) 150 mg tablet    Vaginal itching        Relevant Medications    fluconazole (Diflucan) 150 mg tablet        DISCHARGE SUMMARY:   For URI symptoms: Diagnosis, treatment, treatment options, and possible complications of today's illness discussed and explained to patient. Patient to take medication/s associated with this visit. Patient may take OTC decongestant of choice as needed. Patient may also take OTC analgesic/antipyretic if needed for pain/fever. Advised to increase oral fluid intake. Advised steam inhalation if needed to relieve congestion. Advised warm saline gargle if needed to relieve throat discomfort. Advised Listerine antiseptic mouthwash gargle TID. Patient may use Cepacol oral spray as needed to relieve throat  discomfort. Patient was advised to discard the old toothbrush and use a new toothbrush beginning on the third of antibiotics. Advised to come back if with worsening or persistent symptoms. Patient verbalized understanding of plan of care.    Patient to come back in 7 - 10 days if needed for worsening symptoms.    2. For yeast infection symptoms: Probable diagnosis, differential diagnosis, treatment, treatment options, and probable complications were discussed and explained to patient. Patient to take medication associated with this visit. She may take OTC antihistamine of choice as needed for itching. She was educated and advised on personal hygiene. She was advised t use cotton underwear. Patient to return to clinic if there is worsening or persistence of symptoms. Patient verbalized understanding.    Patient to come back in 7 - 10 days if needed for worsening symptoms.

## 2023-06-21 NOTE — PATIENT INSTRUCTIONS
DISCHARGE SUMMARY:   For URI symptoms: Diagnosis, treatment, treatment options, and possible complications of today's illness discussed and explained to patient. Patient to take medication/s associated with this visit. Patient may also take OTC analgesic/antipyretic if needed for pain/fever. Advised to increase oral fluid intake. Advised steam inhalation if needed to relieve congestion. Advised warm saline gargle if needed to relieve throat discomfort. Advised Listerine antiseptic mouthwash gargle TID. Patient may use Cepacol oral spray as needed to relieve throat discomfort. Patient was advised to discard the old toothbrush and use a new toothbrush beginning on the third of antibiotics. Advised to come back if with worsening or persistent symptoms. Patient verbalized understanding of plan of care.    Patient to come back in 7 - 10 days if needed for worsening symptoms.    2. For yeast infection symptoms: Probable diagnosis, differential diagnosis, treatment, treatment options, and probable complications were discussed and explained to patient. Patient to take medication associated with this visit. She may take OTC antihistamine of choice as needed for itching. She was educated and advised on personal hygiene. She was advised t use cotton underwear. Patient to return to clinic if there is worsening or persistence of symptoms. Patient verbalized understanding.    Patient to come back in 7 - 10 days if needed for worsening symptoms.

## 2023-07-06 ENCOUNTER — CLINICAL SUPPORT (OUTPATIENT)
Dept: PRIMARY CARE | Facility: CLINIC | Age: 58
End: 2023-07-06
Payer: COMMERCIAL

## 2023-07-06 DIAGNOSIS — J30.9 ALLERGIC RHINITIS, UNSPECIFIED SEASONALITY, UNSPECIFIED TRIGGER: ICD-10-CM

## 2023-07-06 PROCEDURE — 96372 THER/PROPH/DIAG INJ SC/IM: CPT | Performed by: FAMILY MEDICINE

## 2023-07-06 RX ORDER — TRIAMCINOLONE ACETONIDE 40 MG/ML
80 INJECTION, SUSPENSION INTRA-ARTICULAR; INTRAMUSCULAR ONCE
Status: COMPLETED | OUTPATIENT
Start: 2023-07-06 | End: 2023-07-06

## 2023-07-06 RX ADMIN — TRIAMCINOLONE ACETONIDE 80 MG: 40 INJECTION, SUSPENSION INTRA-ARTICULAR; INTRAMUSCULAR at 14:00

## 2023-07-07 NOTE — PROGRESS NOTES
Patient here today for Kenalog injection for seasonal allergies.   Okayed by Dr. Dutton     Given  IM right glute without incident

## 2023-08-25 PROBLEM — Z96.651 STATUS POST TOTAL KNEE REPLACEMENT, RIGHT: Status: ACTIVE | Noted: 2018-09-28

## 2023-08-25 RX ORDER — CEFUROXIME AXETIL 250 MG/1
1 TABLET ORAL EVERY 12 HOURS
COMMUNITY
End: 2023-08-29 | Stop reason: ALTCHOICE

## 2023-08-25 RX ORDER — MONTELUKAST SODIUM 4 MG/1
1 TABLET, CHEWABLE ORAL 3 TIMES DAILY
COMMUNITY
End: 2024-06-04 | Stop reason: SDUPTHER

## 2023-08-29 ENCOUNTER — PATIENT MESSAGE (OUTPATIENT)
Dept: PRIMARY CARE | Facility: CLINIC | Age: 58
End: 2023-08-29

## 2023-08-29 ENCOUNTER — OFFICE VISIT (OUTPATIENT)
Dept: PRIMARY CARE | Facility: CLINIC | Age: 58
End: 2023-08-29
Payer: COMMERCIAL

## 2023-08-29 VITALS
SYSTOLIC BLOOD PRESSURE: 126 MMHG | HEART RATE: 77 BPM | DIASTOLIC BLOOD PRESSURE: 74 MMHG | OXYGEN SATURATION: 98 % | BODY MASS INDEX: 47.09 KG/M2 | RESPIRATION RATE: 14 BRPM | HEIGHT: 66 IN | WEIGHT: 293 LBS

## 2023-08-29 DIAGNOSIS — Z12.39 ENCOUNTER FOR SCREENING FOR MALIGNANT NEOPLASM OF BREAST, UNSPECIFIED SCREENING MODALITY: ICD-10-CM

## 2023-08-29 DIAGNOSIS — Z79.899 MEDICATION MANAGEMENT: ICD-10-CM

## 2023-08-29 DIAGNOSIS — I10 HYPERTENSION, UNSPECIFIED TYPE: ICD-10-CM

## 2023-08-29 DIAGNOSIS — N89.8 VAGINAL ITCHING: ICD-10-CM

## 2023-08-29 DIAGNOSIS — R09.81 NASAL CONGESTION WITH RHINORRHEA: ICD-10-CM

## 2023-08-29 DIAGNOSIS — N39.41 URGE INCONTINENCE: ICD-10-CM

## 2023-08-29 DIAGNOSIS — M79.2 NERVE PAIN: ICD-10-CM

## 2023-08-29 DIAGNOSIS — N39.41 URGE INCONTINENCE: Primary | ICD-10-CM

## 2023-08-29 DIAGNOSIS — J34.89 NASAL CONGESTION WITH RHINORRHEA: ICD-10-CM

## 2023-08-29 DIAGNOSIS — B37.9 YEAST INFECTION: ICD-10-CM

## 2023-08-29 DIAGNOSIS — N89.8 VAGINAL IRRITATION: ICD-10-CM

## 2023-08-29 PROBLEM — E66.813 CLASS 3 SEVERE OBESITY DUE TO EXCESS CALORIES WITH SERIOUS COMORBIDITY AND BODY MASS INDEX (BMI) OF 45.0 TO 49.9 IN ADULT: Status: RESOLVED | Noted: 2023-05-22 | Resolved: 2023-08-29

## 2023-08-29 PROBLEM — E66.01 CLASS 3 SEVERE OBESITY DUE TO EXCESS CALORIES WITH SERIOUS COMORBIDITY AND BODY MASS INDEX (BMI) OF 45.0 TO 49.9 IN ADULT (MULTI): Status: RESOLVED | Noted: 2023-05-22 | Resolved: 2023-08-29

## 2023-08-29 PROCEDURE — 1036F TOBACCO NON-USER: CPT | Performed by: FAMILY MEDICINE

## 2023-08-29 PROCEDURE — 80307 DRUG TEST PRSMV CHEM ANLYZR: CPT

## 2023-08-29 PROCEDURE — 3008F BODY MASS INDEX DOCD: CPT | Performed by: FAMILY MEDICINE

## 2023-08-29 PROCEDURE — 3078F DIAST BP <80 MM HG: CPT | Performed by: FAMILY MEDICINE

## 2023-08-29 PROCEDURE — 99214 OFFICE O/P EST MOD 30 MIN: CPT | Performed by: FAMILY MEDICINE

## 2023-08-29 PROCEDURE — 3074F SYST BP LT 130 MM HG: CPT | Performed by: FAMILY MEDICINE

## 2023-08-29 RX ORDER — AZELASTINE 1 MG/ML
1 SPRAY, METERED NASAL 2 TIMES DAILY
Qty: 30 ML | Refills: 1 | Status: SHIPPED | OUTPATIENT
Start: 2023-08-29 | End: 2024-08-28

## 2023-08-29 RX ORDER — CARVEDILOL 12.5 MG/1
12.5 TABLET ORAL 2 TIMES DAILY
Qty: 180 TABLET | Refills: 3 | Status: SHIPPED | OUTPATIENT
Start: 2023-08-29 | End: 2024-01-16 | Stop reason: SDUPTHER

## 2023-08-29 RX ORDER — AZITHROMYCIN 250 MG/1
TABLET, FILM COATED ORAL
Qty: 6 TABLET | Refills: 0 | Status: SHIPPED | OUTPATIENT
Start: 2023-08-29 | End: 2023-09-03

## 2023-08-29 RX ORDER — OXYBUTYNIN CHLORIDE 10 MG/1
10 TABLET, EXTENDED RELEASE ORAL DAILY
COMMUNITY
Start: 2023-07-26 | End: 2023-08-30

## 2023-08-29 RX ORDER — GABAPENTIN 300 MG/1
CAPSULE ORAL
Qty: 180 CAPSULE | Refills: 0 | Status: SHIPPED | OUTPATIENT
Start: 2023-08-29 | End: 2024-01-31 | Stop reason: WASHOUT

## 2023-08-29 NOTE — PROGRESS NOTES
Subjective   Patient ID: Lauren Coon is a 58 y.o. female who presents for Nasal Congestion.    HPI Pt states ever since she had Covid in March, she states her nose is stuffy or it is running. Some days are worse than others. Pt has gotten steroid injection, has been using zyrtec at night and has been using a nasal spray with minimal relief.     Pt states she is going on Vacation and would like to discuss getting a Z-pack    12 Systems have been reviewed as follows.  Constitutional: Fever, weight gain, weight loss, appetite change, night sweats, fatigue, chills.  Eyes : blurry, double vision, vision, loss, tearing, redness, pain, sensitivity to light, glaucoma.  Ears: nose, mouth, and throat: Hearing loss, ringing in the ears, ear pain, nasal congestion, nasal drainage, nosebleeds, mouth, throat, irritation tooth problem.  Cardiovascular: chest pain, pressure, heart racing, palpitations, sweating, leg swelling, high or low blood pressure  Pulmonary: Cough, yellow or green sputum, blood and sputum, shortness of breath, wheezing  Gastrointestinal: Nausea, vomiting, diarrhea, constipation, pain, blood in stool, or vomitus, heartburn, difficulty swallowing  Genitourinary: incontinence, abnormal bleeding, abnormal discharge, urinary frequency, urinary hesitancy, pain, impotence sexual problem, infection, urinary retention  Musculoskeletal: Pain, stiffness, joint, redness or warmth, arthritis, back pain, weakness, muscle wasting, sprain or fracture  Neuro: Weight weakness, dizziness, change in voice, change in taste change in vision, change in hearing, loss, or change of sensation, trouble walking, balance problems coordination problems, shaking, speech problem  Endocrine: cold or heat intolerance, blood sugar problem, weight gain or loss missed periods hot flashes, sweats, change in body hair, change in libido, increased thirst, increased urination  Heme/lymph: Swelling, bleeding, problem anemia, bruising, enlarged  "lymph nodes  Allergic/immunologic: H. plus nasal drip, watery itchy eyes, nasal drainage, immunosuppressed  The above were reviewed and noted negative except as noted in HPI and Problem List.      Objective   /74 (BP Location: Left arm, Patient Position: Sitting, BP Cuff Size: Adult)   Pulse 77   Resp 14   Ht 1.676 m (5' 6\")   Wt 134 kg (296 lb)   SpO2 98%   BMI 47.78 kg/m²     Physical Exam CONSTITUTIONAL- NAD, Pt is A & O x3, Vital signs reviewed per chart.  General Appearance- normal , good hygiene,talks easily  EYES-PERRLA conjunctiva and lids- normal  EARS/NOSE-TM's normal, head normocephalic and atraumatic, naso pharynx-no nasal discharge, no trismus,  oropharynx- normal without exudate  NECK- supple, FROM, without mass  thyroid- NT, normal size, no nodule noted  LYMPH- WNL  CV- RRR without murmur, gallop, or other abnormality.  PULM- CTA bilaterally  Respiratory effort- normal respiratory effort , no retractions or nasal flaring  ABDOMEN- normoactive BS's , soft , NT, no hepatosplenomegaly palpated, or masses. No pulsatile masses noted  extremities no edema,NT  SKIN- no abnormal skin lesions on inspection or palpation  neuro- no focal deficits  PSYCH- pleasant, normal judgement and insight   Nasal mucosa is red an inflamed   Assessment/Plan   Problem List Items Addressed This Visit       Nerve pain    Relevant Medications    gabapentin (Neurontin) 300 mg capsule    Hypertension    Relevant Medications    carvedilol (Coreg) 12.5 mg tablet     Other Visit Diagnoses       Encounter for screening for malignant neoplasm of breast, unspecified screening modality        Relevant Orders    BI mammo bilateral screening tomosynthesis    Nasal congestion with rhinorrhea        Relevant Medications    azithromycin (Zithromax) 250 mg tablet    azelastine (Astelin) 137 mcg (0.1 %) nasal spray             Scribe Attestation  By signing my name below, Alvin PUTNAM DO  , Scribe   attest that this " documentation has been prepared under the direction and in the presence of Alvin Dutton DO.

## 2023-08-30 LAB
AMPHETAMINE (PRESENCE) IN URINE BY SCREEN METHOD: NORMAL
BARBITURATES PRESENCE IN URINE BY SCREEN METHOD: NORMAL
BENZODIAZEPINE (PRESENCE) IN URINE BY SCREEN METHOD: NORMAL
CANNABINOIDS IN URINE BY SCREEN METHOD: NORMAL
COCAINE (PRESENCE) IN URINE BY SCREEN METHOD: NORMAL
DRUG SCREEN COMMENT URINE: NORMAL
FENTANYL URINE: NORMAL
OPIATES (PRESENCE) IN URINE BY SCREEN METHOD: NORMAL
OXYCODONE (PRESENCE) IN URINE BY SCREEN METHOD: NORMAL
PHENCYCLIDINE (PRESENCE) IN URINE BY SCREEN METHOD: NORMAL

## 2023-08-30 RX ORDER — OXYBUTYNIN CHLORIDE 10 MG/1
TABLET, EXTENDED RELEASE ORAL
Qty: 90 TABLET | Refills: 3 | Status: SHIPPED | OUTPATIENT
Start: 2023-08-30

## 2023-08-30 RX ORDER — OXYBUTYNIN CHLORIDE 10 MG/1
TABLET, EXTENDED RELEASE ORAL
Qty: 30 TABLET | Refills: 0 | Status: SHIPPED | OUTPATIENT
Start: 2023-08-30 | End: 2023-08-30 | Stop reason: SDUPTHER

## 2023-08-30 RX ORDER — FLUCONAZOLE 150 MG/1
150 TABLET ORAL
Qty: 3 TABLET | Refills: 0 | Status: SHIPPED | OUTPATIENT
Start: 2023-08-30 | End: 2024-03-29 | Stop reason: SDUPTHER

## 2023-09-29 ENCOUNTER — HOSPITAL ENCOUNTER (OUTPATIENT)
Dept: DATA CONVERSION | Facility: HOSPITAL | Age: 58
End: 2023-09-29
Attending: STUDENT IN AN ORGANIZED HEALTH CARE EDUCATION/TRAINING PROGRAM | Admitting: STUDENT IN AN ORGANIZED HEALTH CARE EDUCATION/TRAINING PROGRAM
Payer: COMMERCIAL

## 2023-09-29 DIAGNOSIS — Z12.11 ENCOUNTER FOR SCREENING FOR MALIGNANT NEOPLASM OF COLON: ICD-10-CM

## 2023-09-29 DIAGNOSIS — K57.30 DIVERTICULOSIS OF LARGE INTESTINE WITHOUT PERFORATION OR ABSCESS WITHOUT BLEEDING: ICD-10-CM

## 2023-09-29 DIAGNOSIS — D12.2 BENIGN NEOPLASM OF ASCENDING COLON: ICD-10-CM

## 2023-09-29 DIAGNOSIS — D12.4 BENIGN NEOPLASM OF DESCENDING COLON: ICD-10-CM

## 2023-09-29 DIAGNOSIS — D12.3 BENIGN NEOPLASM OF TRANSVERSE COLON: ICD-10-CM

## 2023-09-29 DIAGNOSIS — K63.5 POLYP OF COLON: ICD-10-CM

## 2023-09-29 DIAGNOSIS — K64.8 OTHER HEMORRHOIDS: ICD-10-CM

## 2023-09-29 DIAGNOSIS — Z86.010 PERSONAL HISTORY OF COLONIC POLYPS: ICD-10-CM

## 2023-09-30 NOTE — H&P
History of Present Illness:   Pregnant/Lactating:  ·  Are You Pregnant no   ·  Are You Currently Breastfeeding no     History Present Illness:  Reason for surgery: History of polyps   HPI:    History of polyps --> colonoscopy.    Allergies:        Allergies:  ·  Medrol Dosepak : Rash  ·  morphine : Itching  ·  Bextra : Rash  ·  Vioxx : Rash    Home Medication Review:   Home Medications Reviewed: yes     Impression/Procedure:   ·  Impression and Planned Procedure: History of polyps --> colonoscopy.       ERAS (Enhanced Recovery After Surgery):  ·  ERAS Patient: no       Vital Signs:  Temperature C: 36.3 degrees C   Temperature F: 97.3 degrees F   Heart Rate: 86 beats per minute   Respiratory Rate: 18 breath per minute   Blood Pressure Systolic: 163 mm/Hg   Blood Pressure Diastolic: 67 mm/Hg     Physical Exam by System:    Constitutional: A&Ox3. NAD   Head/Neck: AT/NC   Respiratory/Thorax: CTA bilat. No wheezing.   Cardiovascular: RRR. No murmur.   Gastrointestinal: Soft, NT/ND.   Extremities: No edema.   Neurological: No focal deficits   Psychological: Normal mood and affect.     Consent:   COVID-19 Consent:  ·  COVID-19 Risk Consent Surgeon has reviewed key risks related to the risk of [place] COVID-19 and if they contract COVID-19 what the risks are.       Electronic Signatures:  Vitaliy Salinas ()  (Signed 29-Sep-2023 08:00)   Authored: History of Present Illness, Allergies, Home  Medication Review, Impression/Procedure, ERAS, Physical Exam, Consent, Note Completion      Last Updated: 29-Sep-2023 08:00 by Vitaliy Salinas)

## 2023-10-02 LAB
ATRIAL RATE: 76 BPM
P AXIS: 33 DEGREES
P OFFSET: 176 MS
P ONSET: 119 MS
PR INTERVAL: 174 MS
Q ONSET: 206 MS
QRS COUNT: 13 BEATS
QRS DURATION: 102 MS
QT INTERVAL: 400 MS
QTC CALCULATION(BAZETT): 450 MS
QTC FREDERICIA: 432 MS
R AXIS: -45 DEGREES
T AXIS: 5 DEGREES
T OFFSET: 406 MS
VENTRICULAR RATE: 76 BPM

## 2023-10-03 ENCOUNTER — OFFICE VISIT (OUTPATIENT)
Dept: OPHTHALMOLOGY | Facility: CLINIC | Age: 58
End: 2023-10-03
Payer: COMMERCIAL

## 2023-10-03 DIAGNOSIS — H52.13 MYOPIA, BILATERAL: ICD-10-CM

## 2023-10-03 DIAGNOSIS — Z96.1 PSEUDOPHAKIA: ICD-10-CM

## 2023-10-03 DIAGNOSIS — H35.379 EPIRETINAL MEMBRANE, UNSPECIFIED LATERALITY: Primary | ICD-10-CM

## 2023-10-03 PROCEDURE — 92134 CPTRZ OPH DX IMG PST SGM RTA: CPT | Mod: BILATERAL PROCEDURE

## 2023-10-03 PROCEDURE — 99024 POSTOP FOLLOW-UP VISIT: CPT

## 2023-10-03 ASSESSMENT — TONOMETRY
OD_IOP_MMHG: 20
OS_IOP_MMHG: 20
IOP_METHOD: TONOPEN

## 2023-10-03 ASSESSMENT — ENCOUNTER SYMPTOMS: EYES NEGATIVE: 1

## 2023-10-03 ASSESSMENT — CUP TO DISC RATIO
OS_RATIO: 0.3
OD_RATIO: 0.3

## 2023-10-03 ASSESSMENT — VISUAL ACUITY
OS_PH_SC: 20/50-2
METHOD: SNELLEN - LINEAR

## 2023-10-03 ASSESSMENT — SLIT LAMP EXAM - LIDS
COMMENTS: NORMAL
COMMENTS: NORMAL

## 2023-10-03 ASSESSMENT — EXTERNAL EXAM - RIGHT EYE: OD_EXAM: NORMAL

## 2023-10-03 ASSESSMENT — EXTERNAL EXAM - LEFT EYE: OS_EXAM: NORMAL

## 2023-10-03 NOTE — PROGRESS NOTES
Subjective   Patient ID: Lauren Coon is a 58 y.o. female.    Chief Complaint    New Patient Visit       HPI    59 y/o f, here for a NPV. Last eye exam 6 years ago with ophthalmology. Patient stated that her vision is stable ou, patient has monovision ou, hx of cataract surgery (2016) ou, hx of macular degeneration ou, patient does not wear rx glasses or CL ou, patient is not diabetic, denies any pain/watering/itching/flashes ou, sees floaters os.    Gtts: None  Last edited by Miriam Fontana MD on 10/3/2023  3:07 PM.        No current outpatient medications on file. (Ophthalmology pharm classes)       Current Outpatient Medications (Other)   Medication Sig Dispense Refill    azelastine (Astelin) 137 mcg (0.1 %) nasal spray Administer 1 spray into each nostril 2 times a day. 30 mL 1    carvedilol (Coreg) 12.5 mg tablet Take 1 tablet (12.5 mg) by mouth 2 times a day. 180 tablet 3    colestipol (Colestid) 1 gram tablet Take 1 tablet (1 g) by mouth 3 times a day. Take at least 1 hour after or 4 hours before other medications.      cyclobenzaprine (Flexeril) 10 mg tablet Take 1 tablet (10 mg) by mouth 3 times a day as needed for muscle spasms. 270 tablet 1    fluconazole (Diflucan) 150 mg tablet Take 1 tablet (150 mg) by mouth 1 (one) time per week. 3 tablet 0    gabapentin (Neurontin) 300 mg capsule Take 2 capsule at bedtime 180 capsule 0    gabapentin (Neurontin) 300 mg capsule TAKE 2 CAPSULES BY MOUTH AT BEDTIME 240 capsule 0    ibuprofen 800 mg tablet Take 1 tablet (800 mg) by mouth 2 times a day as needed for mild pain (1 - 3). 180 tablet 1    oxybutynin XL (Ditropan-XL) 10 mg 24 hr tablet TAKE 1 TABLET BY MOUTH EVERY DAY **Do not crush, chew, or split.** 90 tablet 3    spironolactone (Aldactone) 25 mg tablet Take 1 tablet (25 mg) by mouth once daily. 90 tablet 1       Objective   Base Eye Exam       Visual Acuity (Snellen - Linear)         Right Left    Dist sc 20/20-2 ( far ) 20/60 ( near )    Dist  sc   20/50-2              Tonometry (Tonopen, 2:31 PM)         Right Left    Pressure 20 20                  Slit Lamp and Fundus Exam       External Exam         Right Left    External Normal Normal              Slit Lamp Exam         Right Left    Lids/Lashes Normal Normal    Conjunctiva/Sclera White and quiet White and quiet    Cornea Clear Clear    Anterior Chamber Deep and quiet Deep and quiet    Iris Round and reactive Round and reactive    Lens PCIOL with nasal PCO PCIOL with temporal PCO    Anterior Vitreous PVD PVD              Fundus Exam         Right Left    Disc Normal, temporal PPA Normal, temporal PPA    C/D Ratio 0.3 0.3    Macula Normal Normal    Vessels Normal Normal    Periphery Normal Normal                    Assessment/Plan   Diagnoses and all orders for this visit:  Myopia, bilateral  Stable  Continue to monitor    Epiretinal membrane, left eye  Mild epiretinal membrane (ERM) inferior macula  Not visually significant    Pseudophakia  Posterior chamber intraocular lens (PCIOL) with 1+ peripheral PCO in both eyes that is not visually significant at this time

## 2023-10-04 LAB
COMPLETE PATHOLOGY REPORT: NORMAL
CONVERTED FINAL DIAGNOSIS: NORMAL
CONVERTED FINAL REPORT PDF LINK TO COPY AND PASTE: NORMAL
CONVERTED GROSS DESCRIPTION: NORMAL

## 2023-10-26 ENCOUNTER — PHARMACY VISIT (OUTPATIENT)
Dept: PHARMACY | Facility: CLINIC | Age: 58
End: 2023-10-26
Payer: COMMERCIAL

## 2023-10-26 PROCEDURE — RXMED WILLOW AMBULATORY MEDICATION CHARGE

## 2023-11-07 ENCOUNTER — TELEPHONE (OUTPATIENT)
Dept: PRIMARY CARE | Facility: CLINIC | Age: 58
End: 2023-11-07
Payer: COMMERCIAL

## 2023-11-07 DIAGNOSIS — J06.9 UPPER RESPIRATORY TRACT INFECTION, UNSPECIFIED TYPE: Primary | ICD-10-CM

## 2023-11-07 RX ORDER — AZITHROMYCIN 250 MG/1
TABLET, FILM COATED ORAL
Qty: 6 TABLET | Refills: 0 | Status: SHIPPED | OUTPATIENT
Start: 2023-11-07 | End: 2023-11-11

## 2023-11-07 NOTE — TELEPHONE ENCOUNTER
PATIENT HAS A SINUS INFECTION. WOULD LIKE TO KNOW IF YOU CAN SEND IN A SCRIPT TO THE PHARMACY FOR A Z-PACK.    PLEASE ADVISE.

## 2023-11-14 ENCOUNTER — E-VISIT (OUTPATIENT)
Dept: PRIMARY CARE | Facility: CLINIC | Age: 58
End: 2023-11-14
Payer: COMMERCIAL

## 2023-11-14 DIAGNOSIS — M79.2 NERVE PAIN: ICD-10-CM

## 2023-11-15 RX ORDER — GABAPENTIN 300 MG/1
600 CAPSULE ORAL NIGHTLY
Qty: 180 CAPSULE | Refills: 0 | Status: SHIPPED | OUTPATIENT
Start: 2023-11-15 | End: 2024-01-16 | Stop reason: SDUPTHER

## 2024-01-16 ENCOUNTER — OFFICE VISIT (OUTPATIENT)
Dept: PRIMARY CARE | Facility: CLINIC | Age: 59
End: 2024-01-16
Payer: COMMERCIAL

## 2024-01-16 ENCOUNTER — ANCILLARY PROCEDURE (OUTPATIENT)
Dept: RADIOLOGY | Facility: CLINIC | Age: 59
End: 2024-01-16
Payer: COMMERCIAL

## 2024-01-16 VITALS
BODY MASS INDEX: 46.81 KG/M2 | WEIGHT: 290 LBS | SYSTOLIC BLOOD PRESSURE: 136 MMHG | HEART RATE: 83 BPM | DIASTOLIC BLOOD PRESSURE: 92 MMHG | OXYGEN SATURATION: 96 %

## 2024-01-16 DIAGNOSIS — M79.642 LEFT HAND PAIN: ICD-10-CM

## 2024-01-16 DIAGNOSIS — M79.645 PAIN OF LEFT THUMB: ICD-10-CM

## 2024-01-16 DIAGNOSIS — M79.2 NERVE PAIN: ICD-10-CM

## 2024-01-16 DIAGNOSIS — J01.10 ACUTE NON-RECURRENT FRONTAL SINUSITIS: ICD-10-CM

## 2024-01-16 DIAGNOSIS — I10 HYPERTENSION, UNSPECIFIED TYPE: ICD-10-CM

## 2024-01-16 PROCEDURE — 99214 OFFICE O/P EST MOD 30 MIN: CPT | Performed by: FAMILY MEDICINE

## 2024-01-16 PROCEDURE — 73130 X-RAY EXAM OF HAND: CPT | Mod: LT

## 2024-01-16 RX ORDER — IBUPROFEN 800 MG/1
TABLET ORAL
COMMUNITY
Start: 2023-12-28 | End: 2024-05-30 | Stop reason: SDUPTHER

## 2024-01-16 RX ORDER — CARVEDILOL 12.5 MG/1
12.5 TABLET ORAL 2 TIMES DAILY
Qty: 180 TABLET | Refills: 1 | Status: SHIPPED | OUTPATIENT
Start: 2024-01-16 | End: 2024-05-30 | Stop reason: SDUPTHER

## 2024-01-16 RX ORDER — CEFUROXIME AXETIL 500 MG/1
500 TABLET ORAL 2 TIMES DAILY
Qty: 20 TABLET | Refills: 0 | Status: SHIPPED | OUTPATIENT
Start: 2024-01-16 | End: 2024-01-26

## 2024-01-16 RX ORDER — GABAPENTIN 300 MG/1
600 CAPSULE ORAL NIGHTLY
Qty: 180 CAPSULE | Refills: 0 | Status: SHIPPED | OUTPATIENT
Start: 2024-01-16 | End: 2024-02-15 | Stop reason: SINTOL

## 2024-01-16 ASSESSMENT — ENCOUNTER SYMPTOMS
DIZZINESS: 0
ACTIVITY CHANGE: 0
DIARRHEA: 0
HEMATURIA: 0
SEIZURES: 0
HEADACHES: 0
FATIGUE: 0
SORE THROAT: 0
COLOR CHANGE: 0
POLYPHAGIA: 0
CONFUSION: 0
CONSTIPATION: 0
CHEST TIGHTNESS: 0
ARTHRALGIAS: 1
COUGH: 1
PHOTOPHOBIA: 0
SINUS PAIN: 0
SPEECH DIFFICULTY: 0
SHORTNESS OF BREATH: 0
SINUS PRESSURE: 0
ADENOPATHY: 0
PALPITATIONS: 0
EYE PAIN: 0
STRIDOR: 0
TROUBLE SWALLOWING: 0
DECREASED CONCENTRATION: 0
MYALGIAS: 0
NECK STIFFNESS: 0
CONSTITUTIONAL NEGATIVE: 1
RHINORRHEA: 0
ABDOMINAL DISTENTION: 0
SLEEP DISTURBANCE: 0
FLANK PAIN: 0
APPETITE CHANGE: 0
FEVER: 0
RECTAL PAIN: 0
DYSURIA: 0
POLYDIPSIA: 0
ABDOMINAL PAIN: 0
DYSPHORIC MOOD: 0
AGITATION: 0
BLOOD IN STOOL: 0
NERVOUS/ANXIOUS: 0

## 2024-01-16 NOTE — PATIENT INSTRUCTIONS
Begin taking Ceftin.     continue all current medications and therapy for chronic medical conditions     Obtain xray left hand     Obtain EMG of lower extremity.

## 2024-01-16 NOTE — PROGRESS NOTES
Subjective   Patient ID: Lauren Coon is a 58 y.o. female who presents for Leg Pain.    Pt c/o recurring knee pain. Pt has had surgery states she feels some numbness, tingling, achy ness     Pt c/o throbbing sensation, numbness to her left thumb.     C/o congestion, cough.        Review of Systems   Constitutional: Negative.  Negative for activity change, appetite change, fatigue and fever.   HENT:  Negative for congestion, dental problem, ear discharge, ear pain, mouth sores, rhinorrhea, sinus pressure, sinus pain, sore throat, tinnitus and trouble swallowing.    Eyes:  Negative for photophobia, pain and visual disturbance.   Respiratory:  Positive for cough. Negative for chest tightness, shortness of breath and stridor.    Cardiovascular:  Negative for chest pain and palpitations.   Gastrointestinal:  Negative for abdominal distention, abdominal pain, blood in stool, constipation, diarrhea and rectal pain.   Endocrine: Negative for cold intolerance, heat intolerance, polydipsia, polyphagia and polyuria.   Genitourinary:  Negative for dysuria, flank pain, hematuria and urgency.   Musculoskeletal:  Positive for arthralgias. Negative for gait problem, myalgias and neck stiffness.   Skin:  Negative for color change and rash.   Allergic/Immunologic: Negative for environmental allergies and food allergies.   Neurological:  Negative for dizziness, seizures, syncope, speech difficulty and headaches.   Hematological:  Negative for adenopathy.   Psychiatric/Behavioral:  Negative for agitation, confusion, decreased concentration, dysphoric mood and sleep disturbance. The patient is not nervous/anxious.        Objective   BP (!) 136/92   Pulse 83   Wt 132 kg (290 lb)   SpO2 96%   BMI 46.81 kg/m²     Physical Exam  Constitutional: Well developed, well nourished, alert and in no acute distress   Eyes: Normal external exam. Pupils equally round and reactive to light with normal accommodation and extraocular movements  intact.  Neck: Supple, no lymphadenopathy or masses.   Cardiovascular: Regular rate and rhythm, normal S1 and S2, no murmurs, gallops, or rubs. Radial pulses normal. No peripheral edema.  Abdomen: soft, non tender, non distended, no masses or HSM.   Pulmonary: No respiratory distress, lungs clear to auscultation bilaterally. No wheezes, rhonchi, rales.  Skin: Warm, well perfused, normal skin turgor and color.   Neurologic: Cranial nerves II-XII grossly intact.   Psychiatric: Mood calm and affect normal     Assessment/Plan   Problem List Items Addressed This Visit             ICD-10-CM    Nerve pain M79.2    Relevant Medications    gabapentin (Neurontin) 300 mg capsule    Other Relevant Orders    EMG & nerve conduction    Hypertension I10    Relevant Medications    carvedilol (Coreg) 12.5 mg tablet     Other Visit Diagnoses         Codes    Left hand pain     M79.642    Relevant Orders    XR hand left 1-2 views    Pain of left thumb     M79.645    Relevant Orders    XR hand left 1-2 views    Acute non-recurrent frontal sinusitis     J01.10    Relevant Medications    cefuroxime (Ceftin) 500 mg tablet            Scribe Attestation  By signing my name below, I, JOHN Galvin   , Raji   attest that this documentation has been prepared under the direction and in the presence of Alvin Dutton DO.     Provider Attestation - Scribe documentation    All medical record entries made by the Scribe were at my direction and personally dictated by me. I have reviewed the chart and agree that the record accurately reflects my personal performance of the history, physical exam, discussion and plan.

## 2024-01-19 ENCOUNTER — TELEPHONE (OUTPATIENT)
Dept: PRIMARY CARE | Facility: CLINIC | Age: 59
End: 2024-01-19
Payer: COMMERCIAL

## 2024-01-19 NOTE — TELEPHONE ENCOUNTER
----- Message from Lauren Coon sent at 1/18/2024  4:24 PM EST -----  Regarding: Hand xray  Contact: [phone]  Good afternoon     I noticed that my X-ray was back that Dr. Dutton ordered for me on Tuesday. Just wondering if he reviewed it yet and what the results were?  And plan of treatment if any? Please let me know. Thanks!    Jenise

## 2024-01-19 NOTE — TELEPHONE ENCOUNTER
----- Message from Lauren Coon sent at 1/18/2024  4:24 PM EST -----  Regarding: Hand xray  Contact: [phone]  Good afternoon     I noticed that my X-ray was back that Dr. Dutton ordered for me on Tuesday. Just wondering if he reviewed it yet and what the results were?  And plan of treatment if any? Please let me know. Thanks!    Jenise

## 2024-01-29 ENCOUNTER — E-VISIT (OUTPATIENT)
Dept: PRIMARY CARE | Facility: CLINIC | Age: 59
End: 2024-01-29
Payer: COMMERCIAL

## 2024-01-29 ENCOUNTER — HOSPITAL ENCOUNTER (OUTPATIENT)
Dept: NEUROLOGY | Facility: HOSPITAL | Age: 59
Discharge: HOME | End: 2024-01-29
Payer: COMMERCIAL

## 2024-01-29 DIAGNOSIS — M79.2 NERVE PAIN: ICD-10-CM

## 2024-01-29 PROCEDURE — 95910 NRV CNDJ TEST 7-8 STUDIES: CPT

## 2024-01-29 NOTE — PROCEDURES
Electromyography Laboratory Report       Accreditation with Exemplary Status       Name SUNDAR CASPER MRN [id number] Ref Provider MEL HOLCOMB Technologist LONNIE Puckett    Gender Female Age 58 Years EDX Physician Tacos Mcclure MD  Temp ºC 33    1965 Height 5 feet 6 inch Order No [id number] Study Date 2024 8:08 AM      Reason for Referral:   Peripheral Polyneuropathy,     SUNDAR CASPER [id number] 2024 8:08 AM     1 of 1        Motor Nerve Conduction Study       Nerve/Sites Muscle Amplitude Latency Velocity F min     mV ms m/s ms     Right Left Ref. Right Left Ref. Right Left Ref. Right Left Ref.   Peroneal - EDB      Ankle EDB 2.5 3.0 >=2.5 4.1 4.5 <=6.0    51.3 51.3 <=58.8      B. Fibula EDB 2.3 2.7  11.0 12.0  47.1 44.7 >=40.0         A. Fibula EDB               Tibial - AH      Ankle AH 1.0 0.9 >=4.0 6.4 6.8 <=6.0    56.8 55.1 <=57.5      Pop Fossa AH 4.6 3.9  12.6 13.7  59.6 56.0 >=40.0          Sensory Nerve Conduction Study       Nerve/Sites Rec. Site Amplitude Peak Lat Velocity     µV ms m/s     Right Left Ref. Right Left Ref. Right Left Ref.   Sural      Post Calf Post Ankle NR 7.7 >=4.0 NR 4.1 <=4.6 NR 48.0 >=40.0   Superficial Peroneal      Lat Leg Ankle 11.6 NR >=4.0 3.8 NR <=4.6 33.1 NR >=40.0           EMG Summary Table     Spontaneous Voluntary Activity   Muscle Nerve Roots Ins Act Fibs/PSW Fasc Other Amp Dur Phases Recruitment Activation Notes   R. APB - Abd Poll Brev Median C8-T1 NL 0 0 - NL NL NL NL NL -   L. EDB - Ext Dig Brev Tibial L5-S1 NL 0 0 - -1 +1 NL NL NL -   L. TA - Tib Ant Deep peroneal (Fibular) L4-L5 NL 0 0 - NL NL NL NL NL -   L. PL - Per Long Peroneal L5-S1 NL 0 0 - NL +1 NL NL NL -   L. AHB - Abd Malone Brev Tibial S1-S2 NL 0 0 - NL +2 NL NL Poor -   L. MG - Med Gastroc Tibial S1-S2 NL 0 0 - -1 +2 NL MO Fair -   L. VL - Vast Lat Femoral L2-L4 NL 0 0 - NL NL NL NL NL -   L. BF - LH Sciatic (tibial division) L5-S2 NL 0 0 - NL NL NL NL NL -   L. BF - SH Sciatic (peroneal  division) L5-S2 NL 0 0 - NL NL NL NL NL -   L. Lumbar psp (low)  - NL 0 0 - NL NL NL NL NL -   L. Lumbar psp (mid)  - NL 0 0 - NL NL NL NL NL -   L. Lumbar psp (up)  - NL 0 0 - NL NL NL NL NL -   R. EDB - Ext Dig Brev Tibial L5-S1 NL 0 0 - -1 +1 NL NL NL -   R. TA - Tib Ant Deep peroneal (Fibular) L4-L5 NL 0 0 - -1 NL NL NL NL -   R. PL - Per Long Peroneal L5-S1 NL 0 0 - -1 NL NL NL NL -   R. AHB - Abd Malone Brev Tibial S1-S2 NL 0 0 - -1 +2 NL NL Fair -   R. MG - Med Gastroc Tibial S1-S2 NL 0 0 - NL +1 NL NL NL -   R. VL - Vast Lat Femoral L2-L4 NL 0 0 - NL NL NL NL NL -   R. BF - LH Sciatic (tibial division) L5-S2 NL 0 0 - NL NL NL NL NL -   R. BF - SH Sciatic (peroneal division) L5-S2 NL 0 0 - NL NL NL NL NL -   R. Lumbar psp (low)  - NL 0 0 - NL NL NL NL NL -   R. Lumbar psp (mid)  - NL 0 0 - NL NL NL NL NL -   R. Lumbar psp (up)  - NL 0 0 - NL NL NL NL NL -           NL = Normal, Inc = Increased, Dec = Decreased, CRD = Complex Repetitive Discharge; SL = Slightly Decreased; MO = Moderately Decreased; MK = Markedly Decreased; N/+1, +1, +2, +3 = Borderline, Slightly, Moderately, Markedly Increased; N/-1, -1, -2, -3 = Borderline, Slightly, Moderately, Markedly Decreased, N/A = Not Applicable    Summary:     Impression:   This study reveals ENMG evidence of a neuropathic process affecting all lower extremity nerves, with a [id number] gradient on needle EMG that is most likely with peripheral neuropathy. This is of moderate degree by electrical criteria. Potential aetiologies include diabetes mellitus, hypothyroidism, the collagen-vascular diseases, deficiencies of B12, folate, or B6, B6 excess, dysglobulinaemias, medications including chemotherapeutic agents, or as a paraneoplastic phenomenon.   There may be an overlying bilateral S1 radiculopathy, though this could not be definitively corroborated with study of more proximal musculature.

## 2024-01-30 ENCOUNTER — CLINICAL SUPPORT (OUTPATIENT)
Dept: PRIMARY CARE | Facility: CLINIC | Age: 59
End: 2024-01-30
Payer: COMMERCIAL

## 2024-01-30 ENCOUNTER — HOSPITAL ENCOUNTER (OUTPATIENT)
Dept: RADIOLOGY | Facility: CLINIC | Age: 59
Discharge: HOME | End: 2024-01-30
Payer: COMMERCIAL

## 2024-01-30 DIAGNOSIS — R09.81 NASAL CONGESTION WITH RHINORRHEA: ICD-10-CM

## 2024-01-30 DIAGNOSIS — J34.89 NASAL CONGESTION WITH RHINORRHEA: ICD-10-CM

## 2024-01-30 DIAGNOSIS — Z12.39 ENCOUNTER FOR SCREENING FOR MALIGNANT NEOPLASM OF BREAST, UNSPECIFIED SCREENING MODALITY: ICD-10-CM

## 2024-01-30 PROCEDURE — 77067 SCR MAMMO BI INCL CAD: CPT

## 2024-01-30 PROCEDURE — 96372 THER/PROPH/DIAG INJ SC/IM: CPT | Performed by: FAMILY MEDICINE

## 2024-01-30 PROCEDURE — 77067 SCR MAMMO BI INCL CAD: CPT | Mod: BILATERAL PROCEDURE | Performed by: STUDENT IN AN ORGANIZED HEALTH CARE EDUCATION/TRAINING PROGRAM

## 2024-01-30 PROCEDURE — 77063 BREAST TOMOSYNTHESIS BI: CPT | Mod: BILATERAL PROCEDURE | Performed by: STUDENT IN AN ORGANIZED HEALTH CARE EDUCATION/TRAINING PROGRAM

## 2024-01-30 RX ORDER — TRIAMCINOLONE ACETONIDE 40 MG/ML
80 INJECTION, SUSPENSION INTRA-ARTICULAR; INTRAMUSCULAR ONCE
Status: COMPLETED | OUTPATIENT
Start: 2024-01-30 | End: 2024-01-30

## 2024-01-30 RX ADMIN — TRIAMCINOLONE ACETONIDE 80 MG: 40 INJECTION, SUSPENSION INTRA-ARTICULAR; INTRAMUSCULAR at 07:45

## 2024-01-30 NOTE — PROGRESS NOTES
Patient presents in the office for a Kenalog injection. Patient received injection in the right ventral gluteal muscle without incident. Patient tolerated well.

## 2024-01-31 ENCOUNTER — OFFICE VISIT (OUTPATIENT)
Dept: PRIMARY CARE | Facility: CLINIC | Age: 59
End: 2024-01-31
Payer: COMMERCIAL

## 2024-01-31 DIAGNOSIS — G62.9 NEUROPATHY: ICD-10-CM

## 2024-01-31 DIAGNOSIS — R73.9 HYPERGLYCEMIA: Primary | ICD-10-CM

## 2024-01-31 DIAGNOSIS — E78.2 MIXED HYPERLIPIDEMIA: ICD-10-CM

## 2024-01-31 PROCEDURE — 1036F TOBACCO NON-USER: CPT | Performed by: FAMILY MEDICINE

## 2024-01-31 PROCEDURE — 3008F BODY MASS INDEX DOCD: CPT | Performed by: FAMILY MEDICINE

## 2024-01-31 PROCEDURE — 99213 OFFICE O/P EST LOW 20 MIN: CPT | Performed by: FAMILY MEDICINE

## 2024-01-31 NOTE — PROGRESS NOTES
Subjective   Patient ID: Lauren Coon is a 58 y.o. female who presents for No chief complaint on file..    HPI  Presents today for above reason  Last PCP: Dr. Dutton. No longer sees because PCP went to part time  Eating healthy. Including: chicken, fish, fruit, vegetables  Sleeping well  Working as:     Pt would like to discuss recent EMG/Nerve conduction testing   Had this on there lower extremities  Since LEFT knee replacement has had numbness from knee into toes.  RIGHT is only slight    Pt states her LEFT thumb is still swelling and throbbing  Last PCP did x-ray on this  Ongoing x 2 months  Pt does wear compression sleeve with some relief    No other concerns today        Review of Systems  Constitutional:  no chills, no fever and no night sweats.  Eyes: no blurred vision and no eyesight problems.  ENT: no hearing loss, no nasal congestion, no hoarseness and no sore throat.  Neck: no mass (es) and no swelling.  Cardiovascular: no chest pain, no intermittent leg claudication, no lower extremity edema, no palpitation and no syncope.  Respiratory: no cough, no shortness of breath during exertion, no shortness of breath at rest and no wheezing.  Gastrointestinal: no abdominal pain, no blood in stools, no constipation, no diarrhea, no melena, no nausea, no rectal pain and no vomiting.  Genitourinary: no dysuria, no change in urinary frequency, no urinary hesitancy and no feelings of urinary urgency.  Musculoskeletal: no arthralgias, no back pain and no myalgias.  Integumentary: no new skin lesions and no rashes.  Neurological: no difficulty walking, no headache, no limb weakness, no numbness and no tingling.  Psychiatric/Behavioral: no anxiety, no depression, no anhedonia and no substance use disorders.  Endocrine: no recent weight gain and no recent weight loss.  Hematologic/Lymphatic: no tendency for easy bruising and no swollen glands    Objective   Physical Exam  There were no vitals  taken for this visit.  Patient here with complaints of left thumb.  Chronic bilateral knee pain history of bilateral knee replacements left leg and right leg numbness and tingling worse on the left side neuropathy type pain after the surgery approximately 2 years ago.  Recent EMG confirms neuropathy.  History of TMJ she is currently on gabapentin for that neuropathic pain that helps for that has not done anything so far for the leg neuropathy discomfort.  She is taking 600 mg gabapentin in the evening will do 300 in the morning 600 tonight and see if she tolerates that and if it makes any difference may need to try Lyrica.  Last blood work that was done is almost 2 years ago and showed elevated glucose and strong family history of diabetes.  We need to repeat the blood work and see what where she is at also history of hyperlipidemia.  She has pain and tenderness at the base of the left thumb x-ray recently shows osteoarthritis with near fusion of the joint space.  And no real help with nonsteroidals and I will think it will do much neither do I think cortisone injection will help I did offer her referral to hand surgeon for any other options she will think about it if it gets worse she will let us know.  Physical exam today's office visit constitutional alert and oriented x3.    Head is atraumatic HEENT is within normal limits.    Neck supple no masses full range of motion.    Thyroid is normal in size no thyromegaly there is no carotid bruits.    Pulmonary exam shows clear to auscultation no respiratory distress.    Cardiovascular shows no murmur rub or gallop.  Regular rate and rhythm.    Abdominal exam soft nontender no hepatosplenomegaly or masses normal bowel sounds no rebound no guarding.    Musculoskeletal exam no joint pain no muscle pain full range of motion.    Psych exam normal mood and affect.    Dermatologic exam no skin lesions no rash no blemishes.    Neuro exam is no focal deficits.  Normal exam.     Extremities no edema normal pulses normal capillary refill.    Lab Results   Component Value Date    WBC 13.1 (H) 03/12/2022    HGB 14.1 03/12/2022    HCT 44.4 03/12/2022    MCV 90 03/12/2022     03/12/2022       Assessment/Plan plan adjust gabapentin dose 300 and morning 600 at night get blood drawn follow-up when we have the test results.  Problem List Items Addressed This Visit    None

## 2024-02-01 ENCOUNTER — LAB (OUTPATIENT)
Dept: LAB | Facility: LAB | Age: 59
End: 2024-02-01
Payer: COMMERCIAL

## 2024-02-01 ENCOUNTER — TELEPHONE (OUTPATIENT)
Dept: PRIMARY CARE | Facility: CLINIC | Age: 59
End: 2024-02-01

## 2024-02-01 DIAGNOSIS — E78.2 MIXED HYPERLIPIDEMIA: ICD-10-CM

## 2024-02-01 DIAGNOSIS — G62.9 NEUROPATHY: ICD-10-CM

## 2024-02-01 DIAGNOSIS — R73.9 HYPERGLYCEMIA: ICD-10-CM

## 2024-02-01 LAB
ALBUMIN SERPL BCP-MCNC: 3.8 G/DL (ref 3.4–5)
ALP SERPL-CCNC: 84 U/L (ref 33–110)
ALT SERPL W P-5'-P-CCNC: 29 U/L (ref 7–45)
ANION GAP SERPL CALC-SCNC: 14 MMOL/L (ref 10–20)
AST SERPL W P-5'-P-CCNC: 22 U/L (ref 9–39)
BASOPHILS # BLD AUTO: 0.06 X10*3/UL (ref 0–0.1)
BASOPHILS NFR BLD AUTO: 0.6 %
BILIRUB SERPL-MCNC: 0.4 MG/DL (ref 0–1.2)
BUN SERPL-MCNC: 18 MG/DL (ref 6–23)
CALCIUM SERPL-MCNC: 9.4 MG/DL (ref 8.6–10.3)
CHLORIDE SERPL-SCNC: 103 MMOL/L (ref 98–107)
CHOLEST SERPL-MCNC: 185 MG/DL (ref 0–199)
CHOLESTEROL/HDL RATIO: 5.3
CO2 SERPL-SCNC: 30 MMOL/L (ref 21–32)
CREAT SERPL-MCNC: 0.61 MG/DL (ref 0.5–1.05)
EGFRCR SERPLBLD CKD-EPI 2021: >90 ML/MIN/1.73M*2
EOSINOPHIL # BLD AUTO: 0.31 X10*3/UL (ref 0–0.7)
EOSINOPHIL NFR BLD AUTO: 3 %
ERYTHROCYTE [DISTWIDTH] IN BLOOD BY AUTOMATED COUNT: 13.3 % (ref 11.5–14.5)
EST. AVERAGE GLUCOSE BLD GHB EST-MCNC: 131 MG/DL
GLUCOSE SERPL-MCNC: 116 MG/DL (ref 74–99)
HBA1C MFR BLD: 6.2 %
HCT VFR BLD AUTO: 44.8 % (ref 36–46)
HDLC SERPL-MCNC: 35.1 MG/DL
HGB BLD-MCNC: 14.1 G/DL (ref 12–16)
IMM GRANULOCYTES # BLD AUTO: 0.05 X10*3/UL (ref 0–0.7)
IMM GRANULOCYTES NFR BLD AUTO: 0.5 % (ref 0–0.9)
LDLC SERPL CALC-MCNC: 121 MG/DL
LYMPHOCYTES # BLD AUTO: 1.94 X10*3/UL (ref 1.2–4.8)
LYMPHOCYTES NFR BLD AUTO: 18.6 %
MCH RBC QN AUTO: 29 PG (ref 26–34)
MCHC RBC AUTO-ENTMCNC: 31.5 G/DL (ref 32–36)
MCV RBC AUTO: 92 FL (ref 80–100)
MONOCYTES # BLD AUTO: 0.77 X10*3/UL (ref 0.1–1)
MONOCYTES NFR BLD AUTO: 7.4 %
NEUTROPHILS # BLD AUTO: 7.29 X10*3/UL (ref 1.2–7.7)
NEUTROPHILS NFR BLD AUTO: 69.9 %
NON HDL CHOLESTEROL: 150 MG/DL (ref 0–149)
NRBC BLD-RTO: 0 /100 WBCS (ref 0–0)
PLATELET # BLD AUTO: 237 X10*3/UL (ref 150–450)
POTASSIUM SERPL-SCNC: 4.6 MMOL/L (ref 3.5–5.3)
PROT SERPL-MCNC: 6.6 G/DL (ref 6.4–8.2)
RBC # BLD AUTO: 4.87 X10*6/UL (ref 4–5.2)
SODIUM SERPL-SCNC: 142 MMOL/L (ref 136–145)
T4 FREE SERPL-MCNC: 1.02 NG/DL (ref 0.61–1.12)
TRIGL SERPL-MCNC: 146 MG/DL (ref 0–149)
VLDL: 29 MG/DL (ref 0–40)
WBC # BLD AUTO: 10.4 X10*3/UL (ref 4.4–11.3)

## 2024-02-01 PROCEDURE — 36415 COLL VENOUS BLD VENIPUNCTURE: CPT

## 2024-02-01 PROCEDURE — 85025 COMPLETE CBC W/AUTO DIFF WBC: CPT

## 2024-02-01 PROCEDURE — 83036 HEMOGLOBIN GLYCOSYLATED A1C: CPT

## 2024-02-01 PROCEDURE — 84439 ASSAY OF FREE THYROXINE: CPT

## 2024-02-01 PROCEDURE — 80053 COMPREHEN METABOLIC PANEL: CPT

## 2024-02-01 PROCEDURE — 80061 LIPID PANEL: CPT

## 2024-02-05 ENCOUNTER — TELEPHONE (OUTPATIENT)
Dept: PRIMARY CARE | Facility: CLINIC | Age: 59
End: 2024-02-05
Payer: COMMERCIAL

## 2024-02-05 DIAGNOSIS — J01.00 ACUTE NON-RECURRENT MAXILLARY SINUSITIS: Primary | ICD-10-CM

## 2024-02-05 DIAGNOSIS — R73.09 ELEVATED HEMOGLOBIN A1C: ICD-10-CM

## 2024-02-05 RX ORDER — AZITHROMYCIN 250 MG/1
TABLET, FILM COATED ORAL
Qty: 6 TABLET | Refills: 0 | Status: SHIPPED | OUTPATIENT
Start: 2024-02-05 | End: 2024-02-09

## 2024-02-05 NOTE — TELEPHONE ENCOUNTER
----- Message from Maged Day MD sent at 2/5/2024  2:07 PM EST -----  Mildly elevated LDL level watch diet repeat fasting lipid panel in 4 months could consider starting 20 mg of Lipitor daily.  Glucose slightly elevated A1c 6.2.  Does not meet criteria for definitive diabetes but is at higher risk.  Needs to watch diet recommend weight loss.  Also can be referred to diabetic nurse nutritionist.

## 2024-02-05 NOTE — TELEPHONE ENCOUNTER
Patient is still having congestion, sinus pressure and pain. Wanted to know if she could get a Zpack sent to her pharmacy    Also, can you please review her lab results      Please advise  Thanks    [facility] in [place]    Patient's # [phone]    DOL visit 1/31/24      PLEASE ADDRESS BOTH MESSAGES

## 2024-02-05 NOTE — TELEPHONE ENCOUNTER
Patient aware. Does not want to start Lipitor but she wants to do the nutrition referral  Aware they will call her to set up   Referral placed and faxed

## 2024-02-12 ENCOUNTER — TELEPHONE (OUTPATIENT)
Dept: PRIMARY CARE | Facility: CLINIC | Age: 59
End: 2024-02-12
Payer: COMMERCIAL

## 2024-02-12 DIAGNOSIS — G62.9 NEUROPATHY: Primary | ICD-10-CM

## 2024-02-12 NOTE — TELEPHONE ENCOUNTER
Patient wanted to let you know that she's tried the Gabapentin in the morning and afternoon and uses the bedtime dose too. The morning and afternoon doses are making her too sleepy. Wanted to know what you advise that she do  Please advise  Thanks    Preferred pharmacy [facility] [place]

## 2024-02-15 RX ORDER — PREGABALIN 75 MG/1
75 CAPSULE ORAL 2 TIMES DAILY
Qty: 28 CAPSULE | Refills: 1 | Status: SHIPPED | OUTPATIENT
Start: 2024-02-15 | End: 2024-02-29

## 2024-02-20 ENCOUNTER — APPOINTMENT (OUTPATIENT)
Dept: PRIMARY CARE | Facility: CLINIC | Age: 59
End: 2024-02-20
Payer: COMMERCIAL

## 2024-02-27 ENCOUNTER — TELEPHONE (OUTPATIENT)
Dept: PRIMARY CARE | Facility: CLINIC | Age: 59
End: 2024-02-27
Payer: COMMERCIAL

## 2024-02-27 DIAGNOSIS — M79.645 PAIN OF LEFT THUMB: Primary | ICD-10-CM

## 2024-02-27 DIAGNOSIS — R20.2 NUMBNESS AND TINGLING OF LEFT LEG: ICD-10-CM

## 2024-02-27 DIAGNOSIS — R20.0 NUMBNESS AND TINGLING OF LEFT LEG: ICD-10-CM

## 2024-02-27 NOTE — TELEPHONE ENCOUNTER
Patient has been on the Lyrica since 2/16/24 and it hasn't made any difference for her. Her leg is still numb and tingling.   Also she states you had talked about putting in a referral for Dr Rothman for her hand. She would like that referral    PLEASE ADDRESS BOTH MESSAGES    Thanks          Patient's # [phone]      Preferred pharmacy [facility] in [place]

## 2024-02-28 NOTE — TELEPHONE ENCOUNTER
Spoke with Angelina in radiology to see if she thought a CAT scan would be helpful. She said yes, they could do the CAT scan for her  Please advise what would you want to order, CAT scan, lumbar spine? With contrast? Without contrast?  Please order CAT scan  Thanks

## 2024-02-28 NOTE — TELEPHONE ENCOUNTER
Patient aware.   Will schedule the referral but she can't get the MRI because she has screws and a plate in her back  Please advise  Thanks

## 2024-02-29 RX ORDER — GABAPENTIN 600 MG/1
600 TABLET ORAL NIGHTLY
Qty: 90 TABLET | Refills: 1 | Status: SHIPPED | OUTPATIENT
Start: 2024-02-29 | End: 2024-05-30 | Stop reason: SDUPTHER

## 2024-02-29 NOTE — TELEPHONE ENCOUNTER
Called pharmacy, LM on RX Line. Failed e-scribe. Patient is aware.    Maged Day MD  Do [id number] [place] [address] Clinical Support Staff16 minutes ago (2:40 PM)       Prescription was sent in

## 2024-02-29 NOTE — TELEPHONE ENCOUNTER
Patient aware. She would like that 600 mg sent in for a 90 day supply if possible because it's cheaper that way  Please send to [facility] in [place]  Thanks

## 2024-02-29 NOTE — TELEPHONE ENCOUNTER
Patient is done with her Lyrica and she wanted to know if she could go back on her Gabapentin for her jaw nerve pain?   She was on Gabapentin 300 mg 2 capsules at bedtime   Please advise  Thanks      Preferred [facility]    Patient's # [phone]

## 2024-03-05 ENCOUNTER — OFFICE VISIT (OUTPATIENT)
Dept: ORTHOPEDIC SURGERY | Facility: CLINIC | Age: 59
End: 2024-03-05
Payer: COMMERCIAL

## 2024-03-05 DIAGNOSIS — M79.645 PAIN OF LEFT THUMB: ICD-10-CM

## 2024-03-05 DIAGNOSIS — M18.12 PRIMARY OSTEOARTHRITIS OF FIRST CARPOMETACARPAL JOINT OF LEFT HAND: ICD-10-CM

## 2024-03-05 DIAGNOSIS — M19.042 DEGENERATIVE ARTHRITIS OF METACARPOPHALANGEAL JOINT OF LEFT THUMB: Primary | ICD-10-CM

## 2024-03-05 PROCEDURE — 3008F BODY MASS INDEX DOCD: CPT | Performed by: ORTHOPAEDIC SURGERY

## 2024-03-05 PROCEDURE — 99214 OFFICE O/P EST MOD 30 MIN: CPT | Performed by: ORTHOPAEDIC SURGERY

## 2024-03-05 PROCEDURE — 20600 DRAIN/INJ JOINT/BURSA W/O US: CPT | Mod: LT | Performed by: ORTHOPAEDIC SURGERY

## 2024-03-05 PROCEDURE — 2500000005 HC RX 250 GENERAL PHARMACY W/O HCPCS: Performed by: ORTHOPAEDIC SURGERY

## 2024-03-05 PROCEDURE — 2500000004 HC RX 250 GENERAL PHARMACY W/ HCPCS (ALT 636 FOR OP/ED): Performed by: ORTHOPAEDIC SURGERY

## 2024-03-05 PROCEDURE — 1036F TOBACCO NON-USER: CPT | Performed by: ORTHOPAEDIC SURGERY

## 2024-03-05 PROCEDURE — L3924 HFO WITHOUT JOINTS PRE OTS: HCPCS | Performed by: ORTHOPAEDIC SURGERY

## 2024-03-05 RX ORDER — LIDOCAINE HYDROCHLORIDE 10 MG/ML
0.5 INJECTION INFILTRATION; PERINEURAL
Status: COMPLETED | OUTPATIENT
Start: 2024-03-05 | End: 2024-03-05

## 2024-03-05 RX ADMIN — TRIAMCINOLONE ACETONIDE 5 MG: 10 INJECTION, SUSPENSION INTRA-ARTICULAR; INTRALESIONAL at 14:15

## 2024-03-05 RX ADMIN — LIDOCAINE HYDROCHLORIDE 0.5 ML: 10 INJECTION, SOLUTION INFILTRATION; PERINEURAL at 14:15

## 2024-03-05 NOTE — PROGRESS NOTES
3/5/2024    Chief Complaint   Patient presents with    Left Hand - Pain     Np xrays at  1/16/24       History of Present Illness:  Patient Lauren Coon , 58 y.o. female, presents today, 3/5/2024, for evaluation of left thumb pain and swelling.  Lauren reports history of chronic left thumb pain that is been bothersome off and on for the last couple years and steadily worsening.  She denies any discrete injury or trauma.  She is right-hand dominant individual with history of hypertension.  She states that pain comes and goes it seems to be relieved by rest and aggravated by use.  She describes increased pain and swelling with cold weather and on rainy days such as today.  She states she discussed this with her PCP who took x-rays and told her she had bone-on-bone arthritis and recommended orthopedic referral.       Review of Systems:   GENERAL: Negative  GI: Negative  MUSCULOSKELETAL: See HPI  SKIN: Negative  NEURO:  Negative     Physical Exam:  GENERAL:  Alert and oriented to person, place, and time.  No acute distress and breathing comfortably; pleasant and cooperative with the examination.  HEENT:  Head is normocephalic and atraumatic.  NECK:  Supple, no visible swelling.  CARDIOVASCULAR:  No palpable tachycardia.  LUNGS:  No audible wheezing or labored breathing.  ABDOMEN:  Nondistended.  Extremities: Evaluation of left upper extremity finds the patient to have a palpable radial artery at the wrist with brisk capillary refill to all digits. The patient has intact sensorium to axillary, radial, median and ulnar nerves. There are no open wounds. There are no signs of infection. There is no evidence of lymphedema or lymphatic streaking. The patient has supple compartments of the left arm, forearm and hand.  Patient has tenderness and mild swelling over the left thumb metacarpal phalangeal joint and CMC articulation.  She has positive basilar grind test no pain over A1 pulley no mechanical symptoms of  triggering noted.     Imaging/Test Results:  Radiographs in the  PACS system of the left hand show evidence of significant osteoarthritic change of the left thumb metacarpal phalangeal joint and CMC.  There is evidence of bone-on-bone arthritic change of both of these joints especially the MCP.  Osteophyte formation is noted.     Assessment:  Left thumb metacarpal phalangeal joint and carpometacarpal osteoarthritis.     Plan:  Operative and nonoperative treatment strategies were discussed.  Recommendations were made for initial nonoperative management through Kenalog injection into the MCP and CMC joint.  This was performed in office today by Dr. Rothman and tolerated by patient.  We will give them a brace to wear for comfort and for activities as needed.  Follow-up again in 6 weeks for repeat clinical exam.  No x-rays necessary upon return.  Continue with all activities as tolerated.  All questions answered at today's visit.        Hand / UE Inj/Asp: L thumb CMC for osteoarthritis on 3/5/2024 2:15 PM  Indications: pain  Details: 25 G needle, dorsal approach  Medications: 5 mg triamcinolone acetonide 10 mg/mL; 0.5 mL lidocaine 10 mg/mL (1 %)  Outcome: tolerated well, no immediate complications    Left Thumb Carpometacarpal Joint Injection: It was explained to the patient that the risks of a steroid injection include but are not limited to infection, local skin irritation, skin atrophy, calcification, continued pain and discomfort, elevated blood sugar, burning, failure to relieve pain, and possible late infection. The patient verbalized good insight and verbalized consent for the injection. It was further explained that the postinjection discomfort can be alleviated with additional medications, ice, elevation, and rest over the first 24 hours, and that these modalities are recommended.    Using aseptic technique, a solution containing 0.5 cc of 5 mg of Kenalog and 0.5 mL of 1% lidocaine without epinephrine was  injected intraarticularly to the left thumb carpometacarpal joint. Digital palpation was used to localize the CMC articulation about the dorsal radial aspect of the joint. Gentle traction was then imparted to the thumb distally and a 25-gauge needle was advanced through the skin, subcutaneous tissue and capsule. The injection was then administered and the patient tolerated the injection well. A band-aid was then placed. It should be noted that ethyl chloride spray was used to make the injection delivery more comfortable for the patient.  Procedure, treatment alternatives, risks and benefits explained, specific risks discussed. Consent was given by the patient. Immediately prior to procedure a time out was called to verify the correct patient, procedure, equipment, support staff and site/side marked as required. Patient was prepped and draped in the usual sterile fashion.       Hand / UE Inj/Asp: L thumb MCP for osteoarthritis on 3/5/2024 2:15 PM  Indications: pain  Details: 25 G needle, dorsal approach  Medications: 5 mg triamcinolone acetonide 10 mg/mL; 0.5 mL lidocaine 10 mg/mL (1 %)  Outcome: tolerated well, no immediate complications  Procedure, treatment alternatives, risks and benefits explained, specific risks discussed. Consent was given by the patient. Immediately prior to procedure a time out was called to verify the correct patient, procedure, equipment, support staff and site/side marked as required. Patient was prepped and draped in the usual sterile fashion.         In a face to face encounter, I performed a history and physical examination, discussed pertinent diagnostic studies if indicated, and discussed diagnosis and management strategies with both the patient and the mid-level provider. I reviewed the mid-level's note and agree with the documented findings and plan of care.  Patient presents today for evaluation of symptomatic left thumb pain at CMC and MCP.  There is focal tenderness to both  joints on today's exam.  Treatment options were discussed.  We talked about operative and nonoperative strategies specific to each zone of arthritis.  Patient elects for trial of steroid injection to both symptomatic joints and for intermittent bracing.  6-week follow-up.  No x-rays upon return.

## 2024-03-12 ENCOUNTER — NUTRITION (OUTPATIENT)
Dept: PRIMARY CARE | Facility: CLINIC | Age: 59
End: 2024-03-12
Payer: COMMERCIAL

## 2024-03-12 VITALS — HEIGHT: 66 IN | BODY MASS INDEX: 46.61 KG/M2 | WEIGHT: 290 LBS

## 2024-03-12 DIAGNOSIS — R73.09 ELEVATED HEMOGLOBIN A1C: Primary | ICD-10-CM

## 2024-03-12 PROCEDURE — 97802 MEDICAL NUTRITION INDIV IN: CPT | Performed by: DIETITIAN, REGISTERED

## 2024-03-12 NOTE — PROGRESS NOTES
"Nutrition Assessment     Reason for Visit:  Lauren Coon is a 58 y.o. female who presents for Nutrition Counseling, diabetes prevention, and Weight Management    She works at . Works M-F day shift, Mondays 10 am - 8 pm, other days about 7:30-5.   She lives with her daughter, her daughter's 5 children and . Her living arrangement will probably change in the next few months and she anticipates her eating habits might change for the better at that time because she can focus on what she wants to eat more.     Anthropometrics:  Anthropometrics  Height: 167.6 cm (5' 6\")  Weight: 132 kg (290 lb) (1/16/24)  BMI (Calculated): 46.83  Weight Change  Weight History / % Weight Change: 299# (5/2023), 280-299# (9/2021-5/2023), 270# (8/2021), 277-278# (6/2020-4/2021), 273# (2/2020)    Food And Nutrient Intake:  Food and Nutrient History  Food and Nutrient History: She is interested in talking about changing her diet to promote weight loss.  Fluid Intake: water, diet tea. Avoids pop the majority of the time. Likes flavored water.  Food Allergy: kiwi  Food Intolerance: lactose (can tolerate cheese, cannot tolerate milk/sour cream), salad dressing, fried foods if she doesn't take colestipol  GI Symptoms: diarrhea (if she doesn't take colestipol)  GI Symptoms greater than 2 weeks: intermittent  Oral Problems: denies  Dentition: own  Sleep Duration/Quality: 7+ hrs continuous (occasionally has disrupted sleep, but typically has good rest)     Food Intake  Amount of Food: Reports portions are not large.  Meal 1: B: snack like Cheez-It crackers. On a weekend, bowl of cereal. Doesn't have much appetite in the morning, doesn't have much time to cook. Has never been in a breakfast habit.  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 Today had an egg roll and fried rice. Sometimes she eats a cookie, not every day. If no lunch is brought in, she stops out for [employer] or [employer] - 5-6 times " per month.  Meal 3: D: last night a bowl of pasta after getting home late.  Snacks: ate meatballs at 4 pm yesterday for a snack (late day at work). Stress eating/boredom eating - chips/popcorn. Can occur at any time of the day.    Food Preparation  Cooking: Family, Patient (feeling burned out on cooking after cooking for years)  Grocery Shopping: Patient, Family  Dining Out: 1 to 3 times a week    Micronutrient Intake  Vitamin Intake:  (none)    Medication and Complementary/Alternative Medicine Use  Nutrition-Related Complementary/Alternative Medicine Use: no herbal supplements    Food And Nutrient Administration:  Diet Experience  Dieting Attempts: WW, and a diet program in preparation of back surgery - it sounds like she might have been on a protein-sparing modified fast, she lost 50# quickly    Physical Activities:  Physical Activity  Type of Physical Activity: doesn't exercise. Has had back problems since 27 years of age, has had back surgery and two knee replacements. Is not interested today in talking about changing exercise habits, wants to focus on food first.    Knowledge Beliefs Attitudes and Behavior  Bingeing and Purging Behavior  Binge Eating Behavior: Absent  Purging Behavior:  (none)    Nutrition Focused Physical Exam:  Deferred, appears well nourished     Nutrition Diagnosis   Malnutrition Diagnosis  Patient has Malnutrition Diagnosis: No  Nutrition Diagnosis  Patient has Nutrition Diagnosis: Yes  Diagnosis Status (1): New  Nutrition Diagnosis 1: Undesirable food choices  Related to (1): lack of meal planning, depends on foods at lunch that she doesn't have control over, sometimes emotional/boredom eating  As Evidenced by (1): she doesn't consistently eat breakfast, lunch can contain unpredictable and sometimes insufficient amounts of protein and vegetables, and dinner is not consistently a balanced plate. Snacking also can be a source of less healthy foods.    Nutrition Interventions/Recommendations    Nutrition Prescription  Individualized Nutrition Prescription Provided for : Carbohydrate controlled diet, plate method  Food and Nutrition Delivery  Meals & Snacks: Modify Composition of Meals/Snacks, Modify schedule of foods/fluids    Patient Instructions   Encouraged patient to pursue balanced eating.   Eat the first meal of the day within about 2 hours of waking up. If that seems too early, then be attentive to the first time of the day that there are signs of hunger appearing, and respond to those hunger signals by eating a meal or snack. Suggested that she can think of breakfast as a light snack if preparing a full meal seems like too much.   Eat food at least every 5 hours. If it will be longer than 5 hours between meals, consider adding a snack between the meals. Eating at regular intervals can help prevent becoming overly hungry.   Use the Plate Method to balance the type of food and amount of foods on the plate.   At snacks, include a food that is a rich source of protein, and include a food from a second food group. Eating food from 2 food groups at a snack can promote more satisfaction than eating a snack that is very small. A snack should be satisfying and help diminish thoughts of hunger until the next meal.   Strive to make pleasure part of eating healthy. Also strive for a good relationship with food. This shouldn't be a temporary diet that can only be maintained with strict willpower. Be curious about what tastes good to you and what foods feel good in your body. No food is inherently good or bad, and eating shouldn't promote negative feelings about food. Be kind to yourself as you work on balanced eating.     2. Utilize the Plate Method at meals in order to balance the types and amounts of food on the plate.   - half of the plate full of non-starchy vegetables. These foods contribute very little carbohydrate to the plate, and they add fiber to the meal. Salad, carrots, bell peppers, zucchini,  "broccoli, cauliflower, asparagus, radishes, carrots and green beans are a few examples of these foods. They can be served cooked or raw.    - one quarter of plate including protein foods. Examples can include meat, nuts, seeds, cheese, cottage cheese, nut butter, fish, seafood, tofu, and edamame.    - one quarter of the plate including carbohydrate foods. These foods include grains, fruit, legumes, starchy vegetables, milk and yogurt. Aim to eat at least half of the daily grains as whole grains.     3. Introduced patient to using a scale of 1-10 to rate hunger/satiety. Reviewed signs of hunger that patient might or might not feel, and encouraged being attentive to notice these signals if they are present. Encouraged patient to honor hunger by eating when feeling at a \"3\" instead of waiting for hunger to become more severe. Encouraged using this method in conjunction with balanced foods on the plate, using the Plate Method. Perhaps eating a light snack in the morning can curtail appetite at lunch, and having a light snack at the end of the work day could help her have limited appetite at dinner.     Nutrition Monitoring and Evaluation   Food/Nutrient Related History Monitoring  Monitoring and Evaluation Plan: Meal/snack pattern, Amount of food  Amount of Food: Estimated amout of food, Types of food  Criteria: The patient will use the Plate Method to guide the amount/type of food to eat at meals.  Meal/Snack Pattern: Estimated meal and snack pattern  Criteria: The patient will eat at least 3 times per day and will add snacks between meals if feeling hungry.    Follow up: PRN     Readiness to Change : Good  Level of Understanding : Good  Anticipated Compliant : Good      "

## 2024-03-13 NOTE — PATIENT INSTRUCTIONS
Encouraged patient to pursue balanced eating.   Eat the first meal of the day within about 2 hours of waking up. If that seems too early, then be attentive to the first time of the day that there are signs of hunger appearing, and respond to those hunger signals by eating a meal or snack. Suggested that she can think of breakfast as a light snack if preparing a full meal seems like too much.   Eat food at least every 5 hours. If it will be longer than 5 hours between meals, consider adding a snack between the meals. Eating at regular intervals can help prevent becoming overly hungry.   Use the Plate Method to balance the type of food and amount of foods on the plate.   At snacks, include a food that is a rich source of protein, and include a food from a second food group. Eating food from 2 food groups at a snack can promote more satisfaction than eating a snack that is very small. A snack should be satisfying and help diminish thoughts of hunger until the next meal.   Strive to make pleasure part of eating healthy. Also strive for a good relationship with food. This shouldn't be a temporary diet that can only be maintained with strict willpower. Be curious about what tastes good to you and what foods feel good in your body. No food is inherently good or bad, and eating shouldn't promote negative feelings about food. Be kind to yourself as you work on balanced eating.     2. Utilize the Plate Method at meals in order to balance the types and amounts of food on the plate.   - half of the plate full of non-starchy vegetables. These foods contribute very little carbohydrate to the plate, and they add fiber to the meal. Salad, carrots, bell peppers, zucchini, broccoli, cauliflower, asparagus, radishes, carrots and green beans are a few examples of these foods. They can be served cooked or raw.    - one quarter of plate including protein foods. Examples can include meat, nuts, seeds, cheese, cottage cheese, nut butter,  "fish, seafood, tofu, and edamame.    - one quarter of the plate including carbohydrate foods. These foods include grains, fruit, legumes, starchy vegetables, milk and yogurt. Aim to eat at least half of the daily grains as whole grains.     3. Introduced patient to using a scale of 1-10 to rate hunger/satiety. Reviewed signs of hunger that patient might or might not feel, and encouraged being attentive to notice these signals if they are present. Encouraged patient to honor hunger by eating when feeling at a \"3\" instead of waiting for hunger to become more severe. Encouraged using this method in conjunction with balanced foods on the plate, using the Plate Method. Perhaps eating a light snack in the morning can curtail appetite at lunch, and having a light snack at the end of the work day could help her have limited appetite at dinner.     "

## 2024-03-18 ENCOUNTER — HOSPITAL ENCOUNTER (OUTPATIENT)
Dept: RADIOLOGY | Facility: HOSPITAL | Age: 59
Discharge: HOME | End: 2024-03-18
Payer: COMMERCIAL

## 2024-03-18 DIAGNOSIS — R20.2 NUMBNESS AND TINGLING OF LEFT LEG: ICD-10-CM

## 2024-03-18 DIAGNOSIS — R20.0 NUMBNESS AND TINGLING OF LEFT LEG: ICD-10-CM

## 2024-03-18 PROCEDURE — 72131 CT LUMBAR SPINE W/O DYE: CPT | Performed by: RADIOLOGY

## 2024-03-18 PROCEDURE — 72131 CT LUMBAR SPINE W/O DYE: CPT

## 2024-03-20 ENCOUNTER — TELEPHONE (OUTPATIENT)
Dept: PRIMARY CARE | Facility: CLINIC | Age: 59
End: 2024-03-20
Payer: COMMERCIAL

## 2024-03-20 NOTE — TELEPHONE ENCOUNTER
----- Message from Maged Day MD sent at 3/18/2024 10:09 AM EDT -----  Postoperative changes only no fractures or evidence of spinal or foraminal stenosis.  Still having pain refer to pain management

## 2024-03-29 ENCOUNTER — TELEPHONE (OUTPATIENT)
Dept: PRIMARY CARE | Facility: CLINIC | Age: 59
End: 2024-03-29
Payer: COMMERCIAL

## 2024-03-29 DIAGNOSIS — N89.8 VAGINAL ITCHING: ICD-10-CM

## 2024-03-29 DIAGNOSIS — J01.00 ACUTE MAXILLARY SINUSITIS, RECURRENCE NOT SPECIFIED: Primary | ICD-10-CM

## 2024-03-29 DIAGNOSIS — B37.9 YEAST INFECTION: ICD-10-CM

## 2024-03-29 DIAGNOSIS — N89.8 VAGINAL IRRITATION: ICD-10-CM

## 2024-03-29 RX ORDER — FLUCONAZOLE 150 MG/1
150 TABLET ORAL
Qty: 3 TABLET | Refills: 1 | Status: SHIPPED | OUTPATIENT
Start: 2024-03-29

## 2024-03-29 RX ORDER — SULFAMETHOXAZOLE AND TRIMETHOPRIM 800; 160 MG/1; MG/1
1 TABLET ORAL 2 TIMES DAILY
Qty: 20 TABLET | Refills: 0 | Status: SHIPPED | OUTPATIENT
Start: 2024-03-29 | End: 2024-04-08

## 2024-03-29 NOTE — TELEPHONE ENCOUNTER
Patient c/o sinus pressure, pain, headache and her teeth even hurt. She wanted to know if you could call in something for her?  If so, can you also call in Diflucan too?  Please advise  Thanks      Preferred pharmacy [facility]      (Lauren up front)

## 2024-04-15 ENCOUNTER — APPOINTMENT (OUTPATIENT)
Dept: ORTHOPEDIC SURGERY | Facility: CLINIC | Age: 59
End: 2024-04-15
Payer: COMMERCIAL

## 2024-04-16 ENCOUNTER — APPOINTMENT (OUTPATIENT)
Dept: ORTHOPEDIC SURGERY | Facility: CLINIC | Age: 59
End: 2024-04-16
Payer: COMMERCIAL

## 2024-05-21 ENCOUNTER — CLINICAL SUPPORT (OUTPATIENT)
Dept: PRIMARY CARE | Facility: CLINIC | Age: 59
End: 2024-05-21
Payer: COMMERCIAL

## 2024-05-21 DIAGNOSIS — J30.2 SEASONAL ALLERGIES: ICD-10-CM

## 2024-05-21 PROCEDURE — 96372 THER/PROPH/DIAG INJ SC/IM: CPT | Performed by: FAMILY MEDICINE

## 2024-05-21 RX ORDER — TRIAMCINOLONE ACETONIDE 40 MG/ML
80 INJECTION, SUSPENSION INTRA-ARTICULAR; INTRAMUSCULAR ONCE
Status: COMPLETED | OUTPATIENT
Start: 2024-05-21 | End: 2024-05-21

## 2024-05-21 RX ADMIN — TRIAMCINOLONE ACETONIDE 80 MG: 40 INJECTION, SUSPENSION INTRA-ARTICULAR; INTRAMUSCULAR at 07:40

## 2024-05-21 NOTE — PROGRESS NOTES
Patient presents today for a Kenalog injection.    Kenalog was injected into right gluteal.  Patient tolerated well.  No redness or swelling of injection site.  Consent obtained.

## 2024-05-23 ENCOUNTER — APPOINTMENT (OUTPATIENT)
Dept: PRIMARY CARE | Facility: CLINIC | Age: 59
End: 2024-05-23
Payer: COMMERCIAL

## 2024-05-30 ENCOUNTER — OFFICE VISIT (OUTPATIENT)
Dept: PRIMARY CARE | Facility: CLINIC | Age: 59
End: 2024-05-30
Payer: COMMERCIAL

## 2024-05-30 VITALS
TEMPERATURE: 97.1 F | WEIGHT: 289 LBS | HEIGHT: 66 IN | HEART RATE: 76 BPM | SYSTOLIC BLOOD PRESSURE: 140 MMHG | DIASTOLIC BLOOD PRESSURE: 80 MMHG | BODY MASS INDEX: 46.45 KG/M2 | OXYGEN SATURATION: 96 %

## 2024-05-30 DIAGNOSIS — Z00.00 ANNUAL PHYSICAL EXAM: Primary | ICD-10-CM

## 2024-05-30 DIAGNOSIS — I10 HYPERTENSION, UNSPECIFIED TYPE: ICD-10-CM

## 2024-05-30 DIAGNOSIS — R20.2 NUMBNESS AND TINGLING OF LEFT LEG: ICD-10-CM

## 2024-05-30 DIAGNOSIS — E66.01 CLASS 3 SEVERE OBESITY DUE TO EXCESS CALORIES WITH SERIOUS COMORBIDITY AND BODY MASS INDEX (BMI) OF 45.0 TO 49.9 IN ADULT (MULTI): ICD-10-CM

## 2024-05-30 DIAGNOSIS — R20.0 NUMBNESS AND TINGLING OF LEFT LEG: ICD-10-CM

## 2024-05-30 DIAGNOSIS — M25.552 LEFT HIP PAIN: ICD-10-CM

## 2024-05-30 PROCEDURE — 99396 PREV VISIT EST AGE 40-64: CPT | Performed by: FAMILY MEDICINE

## 2024-05-30 PROCEDURE — 1036F TOBACCO NON-USER: CPT | Performed by: FAMILY MEDICINE

## 2024-05-30 PROCEDURE — 3008F BODY MASS INDEX DOCD: CPT | Performed by: FAMILY MEDICINE

## 2024-05-30 PROCEDURE — 3079F DIAST BP 80-89 MM HG: CPT | Performed by: FAMILY MEDICINE

## 2024-05-30 PROCEDURE — 3077F SYST BP >= 140 MM HG: CPT | Performed by: FAMILY MEDICINE

## 2024-05-30 RX ORDER — GABAPENTIN 600 MG/1
600 TABLET ORAL NIGHTLY
Qty: 90 TABLET | Refills: 1 | Status: SHIPPED | OUTPATIENT
Start: 2024-05-30 | End: 2024-11-26

## 2024-05-30 RX ORDER — CARVEDILOL 12.5 MG/1
12.5 TABLET ORAL 2 TIMES DAILY
Qty: 180 TABLET | Refills: 1 | Status: SHIPPED | OUTPATIENT
Start: 2024-05-30

## 2024-05-30 RX ORDER — IBUPROFEN 800 MG/1
TABLET ORAL
Qty: 30 TABLET | Refills: 3 | Status: SHIPPED | OUTPATIENT
Start: 2024-05-30

## 2024-05-30 ASSESSMENT — PATIENT HEALTH QUESTIONNAIRE - PHQ9
SUM OF ALL RESPONSES TO PHQ9 QUESTIONS 1 AND 2: 0
1. LITTLE INTEREST OR PLEASURE IN DOING THINGS: NOT AT ALL
2. FEELING DOWN, DEPRESSED OR HOPELESS: NOT AT ALL

## 2024-05-30 NOTE — PROGRESS NOTES
Subjective   Patient ID: Lauren Coon is a 58 y.o. female who presents for left leg numbness, left leg tingling, Annual Exam, and left hip pain.  HPI    Patient presents in the office today for an annual visit.    Patient also has complaints of left leg tingling and numbness that has gotten worse. Patient states it is not getting better. Has not tried anything.    Patient would like to see if she can get a referral to either pain management or to a vein specialist.     Patient would like to discuss her left hip. Patient states when she is walking her left hip is giving her pain. Patient states she has tried Motrin and Tylenol for the pain.    Review of Systems  Constitutional:  no chills, no fever and no night sweats.  Eyes: no blurred vision and no eyesight problems.  ENT: no hearing loss, no nasal congestion, no hoarseness and no sore throat.  Neck: no mass (es) and no swelling.  Cardiovascular: no chest pain, no intermittent leg claudication, no lower extremity edema, no palpitation and no syncope.  Respiratory: no cough, no shortness of breath during exertion, no shortness of breath at rest and no wheezing.  Gastrointestinal: no abdominal pain, no blood in stools, no constipation, no diarrhea, no melena, no nausea, no rectal pain and no vomiting.  Genitourinary: no dysuria, no change in urinary frequency, no urinary hesitancy and no feelings of urinary urgency.  Musculoskeletal: no arthralgias, no back pain and no myalgias.  Integumentary: no new skin lesions and no rashes.  Neurological: no difficulty walking, no headache, no limb weakness, no numbness and no tingling.  Psychiatric/Behavioral: no anxiety, no depression, no anhedonia and no substance use disorders.  Endocrine: no recent weight gain and no recent weight loss.  Hematologic/Lymphatic: no tendency for easy bruising and no swollen glands    Objective   Physical Exam  Patient here for follow-up left leg numbness tingling left hip pain history of  "arthritis bilateral knee replacements.  No other new physical symptoms.  We discussed vascular medicine to see if they can do anything for the neuropathy symptoms if they cannot then we would send her to pain management.  Up-to-date on medications no refills needed currently.  Numbness extends from the hip down the leg more so in the calf into the ankle and foot.  /80 (BP Location: Left arm, Patient Position: Sitting)   Pulse 76   Temp 36.2 °C (97.1 °F) (Temporal)   Ht 1.676 m (5' 6\")   Wt 131 kg (289 lb)   SpO2 96%   BMI 46.65 kg/m²     Lab Results   Component Value Date    WBC 10.4 02/01/2024    HGB 14.1 02/01/2024    HCT 44.8 02/01/2024    MCV 92 02/01/2024     02/01/2024       Assessment/Plan plan refer to vascular medicine and physical therapy for the hip pain await their evaluations and recommendations.  Problem List Items Addressed This Visit       Hypertension    BMI 45.0-49.9, adult (Multi)     Other Visit Diagnoses       Annual physical exam    -  Primary    Numbness and tingling of left leg        Left hip pain        Class 3 severe obesity due to excess calories with serious comorbidity and body mass index (BMI) of 45.0 to 49.9 in adult (Multi)              "

## 2024-06-03 ENCOUNTER — PATIENT MESSAGE (OUTPATIENT)
Dept: GASTROENTEROLOGY | Facility: CLINIC | Age: 59
End: 2024-06-03
Payer: COMMERCIAL

## 2024-06-03 DIAGNOSIS — R19.7 DIARRHEA, UNSPECIFIED TYPE: Primary | ICD-10-CM

## 2024-06-04 RX ORDER — MONTELUKAST SODIUM 4 MG/1
1 TABLET, CHEWABLE ORAL 3 TIMES DAILY
Qty: 90 TABLET | Refills: 3 | Status: SHIPPED | OUTPATIENT
Start: 2024-06-04

## 2024-06-26 ENCOUNTER — TELEPHONE (OUTPATIENT)
Dept: PRIMARY CARE | Facility: CLINIC | Age: 59
End: 2024-06-26
Payer: COMMERCIAL

## 2024-06-26 DIAGNOSIS — J03.80 ACUTE TONSILLITIS DUE TO OTHER SPECIFIED ORGANISMS: Primary | ICD-10-CM

## 2024-06-26 RX ORDER — AZITHROMYCIN 250 MG/1
TABLET, FILM COATED ORAL DAILY
Qty: 6 TABLET | Refills: 0 | Status: SHIPPED | OUTPATIENT
Start: 2024-06-26 | End: 2024-06-30

## 2024-06-26 NOTE — TELEPHONE ENCOUNTER
PATIENT WOKE UP THIS MORNING WITH A SORE THROAT, HORSE VOICE, CONGESTION, STUFFY NOSE, HEADACHE, SINUS PRESSURE.    SHE WOULD LIKE TO KNOW IF YOU WOULD BE ABLE TO SEND IN AN ANTIBIOTIC TO HER PHARMACY.    PLEASE ADVISE.

## 2024-06-26 NOTE — TELEPHONE ENCOUNTER
Maged Day MD  Do [id number] ClericalJust now (3:37 PM)       Prescription was sent in       PATIENT AWARE.

## 2024-08-06 ENCOUNTER — APPOINTMENT (OUTPATIENT)
Dept: ORTHOPEDIC SURGERY | Facility: CLINIC | Age: 59
End: 2024-08-06
Payer: COMMERCIAL

## 2024-08-06 DIAGNOSIS — R09.89 CHEST CONGESTION: ICD-10-CM

## 2024-08-06 DIAGNOSIS — R09.81 SINUS CONGESTION: ICD-10-CM

## 2024-08-06 RX ORDER — AZITHROMYCIN 250 MG/1
TABLET, FILM COATED ORAL
Qty: 6 TABLET | Refills: 0 | Status: SHIPPED | OUTPATIENT
Start: 2024-08-06 | End: 2024-08-10

## 2024-08-06 RX ORDER — METHYLPREDNISOLONE 4 MG/1
TABLET ORAL
Qty: 21 TABLET | Refills: 0 | Status: SHIPPED | OUTPATIENT
Start: 2024-08-06 | End: 2024-08-06

## 2024-08-20 ENCOUNTER — OFFICE VISIT (OUTPATIENT)
Dept: ORTHOPEDIC SURGERY | Facility: CLINIC | Age: 59
End: 2024-08-20
Payer: COMMERCIAL

## 2024-08-20 VITALS — BODY MASS INDEX: 40.18 KG/M2 | HEIGHT: 66 IN | WEIGHT: 250 LBS

## 2024-08-20 DIAGNOSIS — M19.042 DEGENERATIVE ARTHRITIS OF METACARPOPHALANGEAL JOINT OF LEFT THUMB: ICD-10-CM

## 2024-08-20 DIAGNOSIS — M18.12 PRIMARY OSTEOARTHRITIS OF FIRST CARPOMETACARPAL JOINT OF LEFT HAND: Primary | ICD-10-CM

## 2024-08-20 DIAGNOSIS — M79.645 PAIN OF LEFT THUMB: ICD-10-CM

## 2024-08-20 PROCEDURE — 20600 DRAIN/INJ JOINT/BURSA W/O US: CPT | Mod: LT | Performed by: ORTHOPAEDIC SURGERY

## 2024-08-20 PROCEDURE — 99214 OFFICE O/P EST MOD 30 MIN: CPT | Performed by: ORTHOPAEDIC SURGERY

## 2024-08-20 PROCEDURE — 3008F BODY MASS INDEX DOCD: CPT | Performed by: ORTHOPAEDIC SURGERY

## 2024-08-20 PROCEDURE — 2500000005 HC RX 250 GENERAL PHARMACY W/O HCPCS: Performed by: ORTHOPAEDIC SURGERY

## 2024-08-20 PROCEDURE — 1036F TOBACCO NON-USER: CPT | Performed by: ORTHOPAEDIC SURGERY

## 2024-08-20 PROCEDURE — 2500000004 HC RX 250 GENERAL PHARMACY W/ HCPCS (ALT 636 FOR OP/ED): Performed by: ORTHOPAEDIC SURGERY

## 2024-08-20 RX ORDER — LIDOCAINE HYDROCHLORIDE 10 MG/ML
0.5 INJECTION INFILTRATION; PERINEURAL
Status: COMPLETED | OUTPATIENT
Start: 2024-08-20 | End: 2024-08-20

## 2024-08-20 NOTE — PROGRESS NOTES
8/20/2024    Chief Complaint   Patient presents with    Left Hand - Follow-up, Pain     Mcp/cmc arthritis  S/p inj 3.5.24        History of Present Illness:  Patient Lauren Coon , 59 y.o. female, presents today, 8/20/2024, for evaluation of left thumb pain and swelling.  She underwent injection to left thumb MCP and CMC in March of this year and got great symptomatic relief.  She states over the last 3 to 4 weeks symptoms began to recur.  She describes pain and swelling.  No antecedent injury with return of symptoms.  She is requesting repeat injection today.       Review of Systems:   GENERAL: Negative  GI: Negative  MUSCULOSKELETAL: See HPI  SKIN: Negative  NEURO:  Negative     Physical Exam:  GENERAL:  Alert and oriented to person, place, and time.  No acute distress and breathing comfortably; pleasant and cooperative with the examination.  HEENT:  Head is normocephalic and atraumatic.  NECK:  Supple, no visible swelling.  CARDIOVASCULAR:  No palpable tachycardia.  LUNGS:  No audible wheezing or labored breathing.  ABDOMEN:  Nondistended.  Extremities: Evaluation of left upper extremity finds the patient to have a palpable radial artery at the wrist with brisk capillary refill to all digits. The patient has intact sensorium to axillary, radial, median and ulnar nerves. There are no open wounds. There are no signs of infection. There is no evidence of lymphedema or lymphatic streaking. The patient has supple compartments of the left arm, forearm and hand.  She is tender to both the left thumb metacarpal phalangeal joint and carpal metacarpal phalangeal joint.  There is mild swelling over the MCP.     Imaging/Test Results:  None today, previous radiographs show significant left thumb MCP and CMC arthritic change.     Assessment:  Left thumb MCP and CMC arthritis.     Plan:  Treatment options were reviewed including operative and nonoperative strategies.  Patient strong preference for continued  nonoperative management.  She elects for repeat Kenalog injection left thumb MCP and CMC today.  These were performed in office today by Dr. Rothman and tolerated well by patient.  Follow-up with our office in as-needed basis symptoms dictate.  All questions answered today's visit.  We do anticipate return in the future.        Hand / UE Inj/Asp: L thumb CMC for osteoarthritis on 8/20/2024 2:37 PM  Indications: pain  Details: 25 G needle, dorsal approach  Medications: 5 mg triamcinolone acetonide 10 mg/mL; 0.5 mL lidocaine 10 mg/mL (1 %)  Outcome: tolerated well, no immediate complications    Left Thumb Carpometacarpal Joint Injection: It was explained to the patient that the risks of a steroid injection include but are not limited to infection, local skin irritation, skin atrophy, calcification, continued pain and discomfort, elevated blood sugar, burning, failure to relieve pain, and possible late infection. The patient verbalized good insight and verbalized consent for the injection. It was further explained that the postinjection discomfort can be alleviated with additional medications, ice, elevation, and rest over the first 24 hours, and that these modalities are recommended.    Using aseptic technique, a solution containing 0.5 cc of 5 mg of Kenalog and 0.5 mL of 1% lidocaine without epinephrine was injected intraarticularly to the left thumb carpometacarpal joint. Digital palpation was used to localize the CMC articulation about the dorsal radial aspect of the joint. Gentle traction was then imparted to the thumb distally and a 25-gauge needle was advanced through the skin, subcutaneous tissue and capsule. The injection was then administered and the patient tolerated the injection well. A band-aid was then placed. It should be noted that ethyl chloride spray was used to make the injection delivery more comfortable for the patient.  Procedure, treatment alternatives, risks and benefits explained, specific  risks discussed. Consent was given by the patient. Immediately prior to procedure a time out was called to verify the correct patient, procedure, equipment, support staff and site/side marked as required. Patient was prepped and draped in the usual sterile fashion.       Hand / UE Inj/Asp: L thumb MCP for osteoarthritis on 8/20/2024 2:37 PM  Indications: pain  Details: 25 G needle, dorsal approach  Medications: 5 mg triamcinolone acetonide 10 mg/mL; 0.5 mL lidocaine 10 mg/mL (1 %)  Outcome: tolerated well, no immediate complications  Procedure, treatment alternatives, risks and benefits explained, specific risks discussed. Consent was given by the patient. Immediately prior to procedure a time out was called to verify the correct patient, procedure, equipment, support staff and site/side marked as required. Patient was prepped and draped in the usual sterile fashion.         In a face to face encounter, I performed a history and physical examination, discussed pertinent diagnostic studies if indicated, and discussed diagnosis and management strategies with both the patient and the mid-level provider. I reviewed the mid-level's note and agree with the documented findings and plan of care.  Patient presents today for evaluation of symptomatic left thumb MCP and CMC arthritis.  These joints are tender and mildly swollen on today's exam.  Previous steroid injection really helped but symptoms came back a few weeks ago and she is requesting repeat steroid injection.  We talked about operative and nonoperative strategies.  Patient elects for steroid injection to left thumb MCP and CMC and for follow-up with me in the office in 6 weeks or on an as-needed basis.

## 2024-08-21 DIAGNOSIS — M54.16 LUMBAR RADICULOPATHY: ICD-10-CM

## 2024-08-21 DIAGNOSIS — R20.2 NUMBNESS AND TINGLING OF LEFT LEG: ICD-10-CM

## 2024-08-21 DIAGNOSIS — I10 HYPERTENSION, UNSPECIFIED TYPE: ICD-10-CM

## 2024-08-21 DIAGNOSIS — R20.0 NUMBNESS AND TINGLING OF LEFT LEG: ICD-10-CM

## 2024-08-21 RX ORDER — SPIRONOLACTONE 25 MG/1
25 TABLET ORAL DAILY
Qty: 30 TABLET | Refills: 3 | Status: SHIPPED | OUTPATIENT
Start: 2024-08-21

## 2024-08-21 RX ORDER — CYCLOBENZAPRINE HCL 10 MG
10 TABLET ORAL 3 TIMES DAILY PRN
Qty: 90 TABLET | Refills: 3 | Status: SHIPPED | OUTPATIENT
Start: 2024-08-21

## 2024-08-21 RX ORDER — GABAPENTIN 600 MG/1
600 TABLET ORAL NIGHTLY
Qty: 30 TABLET | Refills: 3 | Status: SHIPPED | OUTPATIENT
Start: 2024-08-21

## 2024-08-21 NOTE — TELEPHONE ENCOUNTER
MEDICATION PENDED  Patient requesting 3 RXS    Rx Refill Request Telephone Encounter    Name:  Lauren Coon  : 1965     Medication Name:  Gabapentin  Dose (Optional):    600 mg  Quantity (Optional):    #30   3RF  Directions (Optional):   Take 1 tablet (600 mg) by mouth once daily at bedtime    Medication Name:  Cyclobenzaprine  Dose (Optional):    10 mg  Quantity (Optional):  #90   3 RF    Directions (Optional):   Take 1 tablet (10 mg) by mouth 3 times a day as needed for muscle spasms     Medication Name:  Spirolactone  Dose (Optional):    25 mg  Quantity (Optional):   #30 3 RF   Directions (Optional):   Take 1 tablet (25mg) by mouth once daily     ALLERGIES:   see list     Specific Pharmacy location:  [facility]     Date of last appointment:  24  Date of next appointment:  none     Best number to reach patient:  [phone]

## 2024-08-30 ENCOUNTER — TELEPHONE (OUTPATIENT)
Dept: PRIMARY CARE | Facility: CLINIC | Age: 59
End: 2024-08-30
Payer: COMMERCIAL

## 2024-08-30 DIAGNOSIS — J06.9 UPPER RESPIRATORY TRACT INFECTION, UNSPECIFIED TYPE: Primary | ICD-10-CM

## 2024-08-30 RX ORDER — AZITHROMYCIN 250 MG/1
TABLET, FILM COATED ORAL
Qty: 6 TABLET | Refills: 0 | Status: SHIPPED | OUTPATIENT
Start: 2024-08-30 | End: 2024-09-03

## 2024-08-30 NOTE — TELEPHONE ENCOUNTER
Patient is going to be going on vacation for 2 weeks out of state and doesn't want to get stuck with a sinus infection or something. She gets them on and off. She is planning on making an appointment with the ENT.  She wanted to know if you could send in something to her local pharmacy before she leaves to take with her on vacation in case she develops a sinus infection    Please advise    (Please let her know up front)  Thanks      Preferred pharmacy [facility]

## 2024-08-30 NOTE — TELEPHONE ENCOUNTER
Sebas Julio MD  Do [id number] [facility] [id number] minutes ago (12:42 PM)       E prescribed med to their pharmacy.  Call patient and inform.       Patient aware.

## 2024-10-03 ENCOUNTER — OFFICE VISIT (OUTPATIENT)
Dept: PRIMARY CARE | Facility: CLINIC | Age: 59
End: 2024-10-03
Payer: COMMERCIAL

## 2024-10-03 VITALS
BODY MASS INDEX: 40.35 KG/M2 | OXYGEN SATURATION: 97 % | HEIGHT: 66 IN | TEMPERATURE: 97.1 F | DIASTOLIC BLOOD PRESSURE: 74 MMHG | SYSTOLIC BLOOD PRESSURE: 114 MMHG | HEART RATE: 76 BPM

## 2024-10-03 DIAGNOSIS — I10 HYPERTENSION, UNSPECIFIED TYPE: ICD-10-CM

## 2024-10-03 DIAGNOSIS — E78.2 MIXED HYPERLIPIDEMIA: ICD-10-CM

## 2024-10-03 DIAGNOSIS — R60.0 BILATERAL EDEMA OF LOWER EXTREMITY: Primary | ICD-10-CM

## 2024-10-03 PROCEDURE — 1036F TOBACCO NON-USER: CPT | Performed by: FAMILY MEDICINE

## 2024-10-03 PROCEDURE — 3074F SYST BP LT 130 MM HG: CPT | Performed by: FAMILY MEDICINE

## 2024-10-03 PROCEDURE — 3078F DIAST BP <80 MM HG: CPT | Performed by: FAMILY MEDICINE

## 2024-10-03 PROCEDURE — 99213 OFFICE O/P EST LOW 20 MIN: CPT | Performed by: FAMILY MEDICINE

## 2024-10-03 RX ORDER — FUROSEMIDE 20 MG/1
20 TABLET ORAL DAILY
Qty: 90 TABLET | Refills: 2 | Status: SHIPPED | OUTPATIENT
Start: 2024-10-03 | End: 2025-01-01

## 2024-10-03 ASSESSMENT — PATIENT HEALTH QUESTIONNAIRE - PHQ9
2. FEELING DOWN, DEPRESSED OR HOPELESS: NOT AT ALL
1. LITTLE INTEREST OR PLEASURE IN DOING THINGS: NOT AT ALL
SUM OF ALL RESPONSES TO PHQ9 QUESTIONS 1 AND 2: 0

## 2024-10-03 NOTE — PROGRESS NOTES
Subjective   Patient ID: Lauren Coon is a 59 y.o. female who presents for Leg Swelling and Foot Swelling.  HPI    Patient presents in the office today with complaints of swollen legs and feet. Patient states as she got older it comes and goes with the heat. Ongoing X last month. Has tried spirolactone, says it is not working, would like lasix..    Patient declines the flu vaccine today.      Review of Systems  Constitutional:  no chills, no fever and no night sweats.  Eyes: no blurred vision and no eyesight problems.  ENT: no hearing loss, no nasal congestion, no hoarseness and no sore throat.  Neck: no mass (es) and no swelling.  Cardiovascular: no chest pain, no intermittent leg claudication, no lower extremity edema, no palpitation and no syncope.  Respiratory: no cough, no shortness of breath during exertion, no shortness of breath at rest and no wheezing.  Gastrointestinal: no abdominal pain, no blood in stools, no constipation, no diarrhea, no melena, no nausea, no rectal pain and no vomiting.  Genitourinary: no dysuria, no change in urinary frequency, no urinary hesitancy and no feelings of urinary urgency.  Musculoskeletal: no arthralgias, no back pain and no myalgias.  Integumentary: no new skin lesions and no rashes.  Neurological: no difficulty walking, no headache, no limb weakness, no numbness and no tingling.  Psychiatric/Behavioral: no anxiety, no depression, no anhedonia and no substance use disorders.  Endocrine: no recent weight gain and no recent weight loss.  Hematologic/Lymphatic: no tendency for easy bruising and no swollen glands    Objective   Physical Exam  Patient with a history of peripheral edema spironolactone is not really helping.  20 mg Lasix has worked better in the past she just uses it intermittently recently on vacation and spent a lot of time on her feet and walking around also sitting in a car for a long period of time.  Exam shows some mild pitting edema no skin breakdown  "no other complaints or problems chronic issues are well-controlled with her current medication therapy.  /74 (BP Location: Left arm, Patient Position: Sitting)   Pulse 76   Temp 36.2 °C (97.1 °F) (Temporal)   Ht 1.676 m (5' 6\")   SpO2 97%   BMI 40.35 kg/m²     Lab Results   Component Value Date    WBC 10.4 02/01/2024    HGB 14.1 02/01/2024    HCT 44.8 02/01/2024    MCV 92 02/01/2024     02/01/2024       Assessment/Plan plan DC spironolactone restart Lasix 20 as needed she does not use it every day.  Up-to-date on lab work due for repeat labs in March 2025.  Problem List Items Addressed This Visit          Cardiac and Vasculature    Hypertension     Other Visit Diagnoses       Bilateral edema of lower extremity    -  Primary    Relevant Medications    furosemide (Lasix) 20 mg tablet    Mixed hyperlipidemia              "

## 2024-10-23 ENCOUNTER — OFFICE VISIT (OUTPATIENT)
Dept: GASTROENTEROLOGY | Facility: CLINIC | Age: 59
End: 2024-10-23
Payer: COMMERCIAL

## 2024-10-23 DIAGNOSIS — R19.7 DIARRHEA, UNSPECIFIED TYPE: ICD-10-CM

## 2024-10-23 PROCEDURE — 99213 OFFICE O/P EST LOW 20 MIN: CPT | Performed by: STUDENT IN AN ORGANIZED HEALTH CARE EDUCATION/TRAINING PROGRAM

## 2024-10-23 RX ORDER — MONTELUKAST SODIUM 4 MG/1
1 TABLET, CHEWABLE ORAL 3 TIMES DAILY
Qty: 90 TABLET | Refills: 3 | Status: SHIPPED | OUTPATIENT
Start: 2024-10-23

## 2024-10-23 NOTE — PROGRESS NOTES
CC: Follow-up of bile acid diarrhea.    History of Present Illness:   Lauren Coon is a 59 y.o. female with a PMH of HTN, HLD, obesity, prediabetes who presents to clinic for bile acid diarrhea.  Patient is doing well.  Colestipol is working well.  Will occasionally get diarrhea with stressful situations.  Will take Imodium during these times (roughly once per month).  No other complaints at this time.    Colonoscopy 9/2023:  - The examined portion of the ileum was normal.  - Normal mucosa in the entire examined colon. Biopsied.  - One 5 mm polyp in the ascending colon, removed with a cold snare. Resected and retrieved. Clips (MR conditional) were placed.  - Two diminutive polyps in the ascending colon, removed with a cold snare. Resected and retrieved.  - Two diminutive polyps in the transverse colon, removed with a cold snare. Resected and retrieved.  - Two diminutive polyps in the descending colon, removed with a cold snare. Resected and retrieved.  - Diverticulosis in the sigmoid colon.  - Internal hemorrhoids.    Review of Systems  ROS Negative unless otherwise stated above.    Past Medical/Surgical History  Past Medical History:   Diagnosis Date    Acute pansinusitis, unspecified 08/17/2021    Acute non-recurrent pansinusitis    Acute upper respiratory infection, unspecified 09/06/2022    Viral URI with cough    Contact with and (suspected) exposure to covid-19 03/08/2022    Exposure to COVID-19 virus    Encounter for immunization 12/18/2020    Need for tetanus booster    Encounter for other screening for malignant neoplasm of breast 04/26/2022    Screening for breast cancer    Encounter for screening for malignant neoplasm of colon 04/20/2021    Screening for colon cancer    Encounter for screening for osteoporosis     Osteoporosis screening    Laceration without foreign body, right lower leg, subsequent encounter 05/04/2021    Laceration of right lower extremity, subsequent encounter    Nausea      Nausea in adult    Other specified symptoms and signs involving the circulatory and respiratory systems 2021    Chest congestion    Personal history of diseases of the skin and subcutaneous tissue 2021    History of cellulitis    Personal history of Methicillin resistant Staphylococcus aureus infection 2021    History of methicillin resistant Staphylococcus aureus infection    Personal history of other diseases of the respiratory system 2022    History of acute sinusitis    Personal history of other diseases of the respiratory system 2020    History of sinusitis    Personal history of other infectious and parasitic diseases 10/09/2020    History of candidiasis of vagina    Personal history of other specified conditions 2022    History of diarrhea    Personal history of other specified conditions 2022    History of nausea and vomiting    Unspecified open wound, right lower leg, initial encounter 2021    Leg wound, right      Past Surgical History:   Procedure Laterality Date    OTHER SURGICAL HISTORY  2019    Knee replacement    OTHER SURGICAL HISTORY  2019    Lower back surgery    OTHER SURGICAL HISTORY  2019    Lithotripsy    OTHER SURGICAL HISTORY  2019    Foot surgery    OTHER SURGICAL HISTORY  2019    Umbilical hernia repair    OTHER SURGICAL HISTORY  2019    Cholecystectomy    OTHER SURGICAL HISTORY  2019    Hysterectomy    OTHER SURGICAL HISTORY  2019     section        Social History   reports that she has never smoked. She has never used smokeless tobacco. She reports that she does not drink alcohol and does not use drugs.     Family History  family history includes Arthritis in her father; Diabetes in her father; Hypertension in her mother; Hyperthyroidism in her mother; Prostate cancer in her father.     Allergies  Allergies   Allergen Reactions    Morphine Itching     Itching really bad    Kiwi  (Actinidia Chinensis) Unknown     Lip swelling    Lactose Diarrhea and Unknown     cramps    Methylprednisolone Unknown     MDP - shoots her BP up and gives her a rash    Rofecoxib Hives and Unknown    Valdecoxib Unknown     pt states she can take motrin, aleve, and toradol without any problems/reactions       Medications  Current Outpatient Medications   Medication Instructions    carvedilol (COREG) 12.5 mg, oral, 2 times daily    colestipol (COLESTID) 1 g, oral, 3 times daily, Take at least 1 hour after or 4 hours before other medications.    cyclobenzaprine (FLEXERIL) 10 mg, oral, 3 times daily PRN    fluconazole (DIFLUCAN) 150 mg, oral, Once Weekly    furosemide (LASIX) 20 mg, oral, Daily    gabapentin (NEURONTIN) 600 mg, oral, Nightly    ibuprofen 800 mg tablet TAKE 1 TABLET BY MOUTH 2 TIMES A DAY AS NEEDED FOR MILD PAIN (PAIN SCALE 1 TO 3)        Objective   General: A&Ox3, NAD.  HEENT: AT/NC.   Skin: No Jaundice.   Neuro: No focal deficits.   Psych: Normal mood and affect.     Lab Results   Component Value Date    WBC 10.4 02/01/2024    HGB 14.1 02/01/2024    HCT 44.8 02/01/2024    MCV 92 02/01/2024     02/01/2024       Chemistry    Lab Results   Component Value Date/Time     02/01/2024 0717    K 4.6 02/01/2024 0717     02/01/2024 0717    CO2 30 02/01/2024 0717    BUN 18 02/01/2024 0717    CREATININE 0.61 02/01/2024 0717    Lab Results   Component Value Date/Time    CALCIUM 9.4 02/01/2024 0717    ALKPHOS 84 02/01/2024 0717    AST 22 02/01/2024 0717    ALT 29 02/01/2024 0717    BILITOT 0.4 02/01/2024 0717             ASSESSMENT/PLAN  Lauren Coon is a 60yo female with a PMH of morbid obesity, HTN, kidney stones, cholecystectomy, and lumbar fusion who presents to clinic for follow up of chronic diarrhea.      Chronic diarrhea, consider bile acid diarrhea vs EPI vs IBS vs overflow diarrhea, improved on Colestipol  -Stool infectious studies were negative.  -Celiac panel was normal.  -Fecal  calprotectin was normal.  -Colonoscopy random biopsy results were normal.  -Pancreatic elastase was <10, however, this can be falsely decreased with liquid stool-she never repeated the test.  -Continue Colestipol.  -Continue Imodium PRN (however, will also be considering overflow diarrhea based on CT and colonoscopy results).  -Avoid pop, dairy, concentrated sugars, carbonated beverages.   -Surveillance colonoscopy in 9/2026.     Total video visit: 4 minutes.    Vitaliy Salinas, DO

## 2024-12-03 ENCOUNTER — OFFICE VISIT (OUTPATIENT)
Dept: PRIMARY CARE | Facility: CLINIC | Age: 59
End: 2024-12-03
Payer: COMMERCIAL

## 2024-12-03 ENCOUNTER — APPOINTMENT (OUTPATIENT)
Dept: LAB | Facility: LAB | Age: 59
End: 2024-12-03
Payer: COMMERCIAL

## 2024-12-03 ENCOUNTER — LAB (OUTPATIENT)
Dept: LAB | Facility: LAB | Age: 59
End: 2024-12-03
Payer: COMMERCIAL

## 2024-12-03 VITALS
WEIGHT: 250 LBS | DIASTOLIC BLOOD PRESSURE: 72 MMHG | HEIGHT: 66 IN | OXYGEN SATURATION: 97 % | BODY MASS INDEX: 40.18 KG/M2 | SYSTOLIC BLOOD PRESSURE: 108 MMHG | HEART RATE: 76 BPM | TEMPERATURE: 97.7 F

## 2024-12-03 DIAGNOSIS — R53.83 FATIGUE, UNSPECIFIED TYPE: ICD-10-CM

## 2024-12-03 DIAGNOSIS — R73.9 HYPERGLYCEMIA: ICD-10-CM

## 2024-12-03 DIAGNOSIS — R73.9 HYPERGLYCEMIA: Primary | ICD-10-CM

## 2024-12-03 LAB
ALBUMIN SERPL BCP-MCNC: 3.9 G/DL (ref 3.4–5)
ALP SERPL-CCNC: 82 U/L (ref 33–110)
ALT SERPL W P-5'-P-CCNC: 23 U/L (ref 7–45)
ANION GAP SERPL CALC-SCNC: 11 MMOL/L (ref 10–20)
AST SERPL W P-5'-P-CCNC: 17 U/L (ref 9–39)
BILIRUB SERPL-MCNC: 0.4 MG/DL (ref 0–1.2)
BUN SERPL-MCNC: 17 MG/DL (ref 6–23)
CALCIUM SERPL-MCNC: 9.2 MG/DL (ref 8.6–10.3)
CHLORIDE SERPL-SCNC: 103 MMOL/L (ref 98–107)
CO2 SERPL-SCNC: 32 MMOL/L (ref 21–32)
CREAT SERPL-MCNC: 0.75 MG/DL (ref 0.5–1.05)
EGFRCR SERPLBLD CKD-EPI 2021: >90 ML/MIN/1.73M*2
EST. AVERAGE GLUCOSE BLD GHB EST-MCNC: 123 MG/DL
GLUCOSE SERPL-MCNC: 119 MG/DL (ref 74–99)
HBA1C MFR BLD: 5.9 %
POTASSIUM SERPL-SCNC: 3.8 MMOL/L (ref 3.5–5.3)
PROT SERPL-MCNC: 6.8 G/DL (ref 6.4–8.2)
SODIUM SERPL-SCNC: 142 MMOL/L (ref 136–145)
T4 FREE SERPL-MCNC: 1.08 NG/DL (ref 0.61–1.12)

## 2024-12-03 PROCEDURE — 3008F BODY MASS INDEX DOCD: CPT | Performed by: FAMILY MEDICINE

## 2024-12-03 PROCEDURE — 99213 OFFICE O/P EST LOW 20 MIN: CPT | Performed by: FAMILY MEDICINE

## 2024-12-03 PROCEDURE — 84439 ASSAY OF FREE THYROXINE: CPT

## 2024-12-03 PROCEDURE — 3078F DIAST BP <80 MM HG: CPT | Performed by: FAMILY MEDICINE

## 2024-12-03 PROCEDURE — 83036 HEMOGLOBIN GLYCOSYLATED A1C: CPT

## 2024-12-03 PROCEDURE — 80053 COMPREHEN METABOLIC PANEL: CPT

## 2024-12-03 PROCEDURE — 36415 COLL VENOUS BLD VENIPUNCTURE: CPT

## 2024-12-03 PROCEDURE — 3074F SYST BP LT 130 MM HG: CPT | Performed by: FAMILY MEDICINE

## 2024-12-03 ASSESSMENT — PATIENT HEALTH QUESTIONNAIRE - PHQ9
1. LITTLE INTEREST OR PLEASURE IN DOING THINGS: NOT AT ALL
SUM OF ALL RESPONSES TO PHQ9 QUESTIONS 1 AND 2: 0
2. FEELING DOWN, DEPRESSED OR HOPELESS: NOT AT ALL

## 2024-12-03 NOTE — PROGRESS NOTES
Subjective   Patient ID: Lauren Coon is a 59 y.o. female who presents for No chief complaint on file..  HPI  Patient presents in the office today with complaints of swollen legs that are painful. Patient states Bilateral leg swelling and the Rt leg is red, itchy, and swollen. Pt is slightly dizzy and very fatigue. Ongoing X 1 month +, fatigue last couple of week and itchiness started over the weekend. Has tried otc anti itch cream .  Review of Systems  Constitutional:  no chills, no fever and no night sweats.  Eyes: no blurred vision and no eyesight problems.  ENT: no hearing loss, no nasal congestion, no hoarseness and no sore throat.  Neck: no mass (es) and no swelling.  Cardiovascular: no chest pain, no intermittent leg claudication, no lower extremity edema, no palpitation and no syncope.  Respiratory: no cough, no shortness of breath during exertion, no shortness of breath at rest and no wheezing.  Gastrointestinal: no abdominal pain, no blood in stools, no constipation, no diarrhea, no melena, no nausea, no rectal pain and no vomiting.  Genitourinary: no dysuria, no change in urinary frequency, no urinary hesitancy and no feelings of urinary urgency.  Musculoskeletal: no arthralgias, no back pain and no myalgias.  Integumentary: no new skin lesions and no rashes.  Neurological: no difficulty walking, no headache, no limb weakness, no numbness and no tingling.  Psychiatric/Behavioral: no anxiety, no depression, no anhedonia and no substance use disorders.  Endocrine: no recent weight gain and no recent weight loss.  Hematologic/Lymphatic: no tendency for easy bruising and no swollen glands    Objective   Physical Exam  Patient with complaints of leg soreness worse on the right side more swelling swelling bilaterally but worse on the right.  She also has some soreness and itching on the right leg mild excoriations.  No obvious rash.  Negative Homans' sign had swelling bilaterally so unlikely to be related  to DVT.  Denies chest pain or shortness of breath with exertion.  Chronic problem stable.  Lungs clear cardiac and abdominal exams are benign.  Increase stress at home due to one of her nieces causing problems within the family.  There were no vitals taken for this visit.    Lab Results   Component Value Date    WBC 10.4 02/01/2024    HGB 14.1 02/01/2024    HCT 44.8 02/01/2024    MCV 92 02/01/2024     02/01/2024       Assessment/Plan plan increase the Lasix to 40 mg a day follow-up in 3 days see how she is doing.  Due to his fatigue also due for hemoglobin A1c will check a free T4 follow-up with her we have those results  Problem List Items Addressed This Visit    None

## 2024-12-05 ENCOUNTER — TELEPHONE (OUTPATIENT)
Dept: PRIMARY CARE | Facility: CLINIC | Age: 59
End: 2024-12-05
Payer: COMMERCIAL

## 2024-12-05 NOTE — TELEPHONE ENCOUNTER
Maged Day MD  Do [id number] [facility] Cler[facility]7 minutes ago (12:59 PM)       Labs normal no evidence of diabetes.       Patient aware

## 2024-12-13 ENCOUNTER — TELEPHONE (OUTPATIENT)
Dept: PRIMARY CARE | Facility: CLINIC | Age: 59
End: 2024-12-13
Payer: COMMERCIAL

## 2024-12-13 DIAGNOSIS — J06.9 UPPER RESPIRATORY TRACT INFECTION, UNSPECIFIED TYPE: Primary | ICD-10-CM

## 2024-12-13 RX ORDER — AZITHROMYCIN 250 MG/1
TABLET, FILM COATED ORAL
Qty: 6 TABLET | Refills: 0 | Status: SHIPPED | OUTPATIENT
Start: 2024-12-13 | End: 2024-12-17

## 2024-12-13 NOTE — TELEPHONE ENCOUNTER
Patient c/o sinus pressure, pain , dizzy, headache yesterday. Hasn't taken anything today so she is getting dull headache again today and forgot her Tylenol and Motrin when she came to work today.  Please advise    Thanks    Preferred pharmacy [facility] in [place]

## 2024-12-27 ENCOUNTER — OFFICE VISIT (OUTPATIENT)
Dept: PRIMARY CARE | Facility: CLINIC | Age: 59
End: 2024-12-27
Payer: COMMERCIAL

## 2024-12-27 VITALS
SYSTOLIC BLOOD PRESSURE: 120 MMHG | TEMPERATURE: 97.5 F | WEIGHT: 250 LBS | HEART RATE: 81 BPM | HEIGHT: 66 IN | OXYGEN SATURATION: 96 % | DIASTOLIC BLOOD PRESSURE: 90 MMHG | BODY MASS INDEX: 40.18 KG/M2

## 2024-12-27 DIAGNOSIS — R20.2 NUMBNESS AND TINGLING OF LEFT LEG: ICD-10-CM

## 2024-12-27 DIAGNOSIS — R20.0 NUMBNESS AND TINGLING OF LEFT LEG: ICD-10-CM

## 2024-12-27 DIAGNOSIS — I10 HYPERTENSION, UNSPECIFIED TYPE: ICD-10-CM

## 2024-12-27 DIAGNOSIS — E66.813 CLASS 3 SEVERE OBESITY DUE TO EXCESS CALORIES WITH BODY MASS INDEX (BMI) OF 40.0 TO 44.9 IN ADULT, UNSPECIFIED WHETHER SERIOUS COMORBIDITY PRESENT: ICD-10-CM

## 2024-12-27 DIAGNOSIS — E66.01 CLASS 3 SEVERE OBESITY DUE TO EXCESS CALORIES WITH BODY MASS INDEX (BMI) OF 40.0 TO 44.9 IN ADULT, UNSPECIFIED WHETHER SERIOUS COMORBIDITY PRESENT: ICD-10-CM

## 2024-12-27 DIAGNOSIS — J32.9 RECURRENT SINUSITIS: Primary | ICD-10-CM

## 2024-12-27 PROCEDURE — 99213 OFFICE O/P EST LOW 20 MIN: CPT | Performed by: FAMILY MEDICINE

## 2024-12-27 PROCEDURE — 3008F BODY MASS INDEX DOCD: CPT | Performed by: FAMILY MEDICINE

## 2024-12-27 PROCEDURE — 3074F SYST BP LT 130 MM HG: CPT | Performed by: FAMILY MEDICINE

## 2024-12-27 PROCEDURE — 3080F DIAST BP >= 90 MM HG: CPT | Performed by: FAMILY MEDICINE

## 2024-12-27 RX ORDER — GABAPENTIN 600 MG/1
600 TABLET ORAL NIGHTLY
Qty: 30 TABLET | Refills: 5 | Status: SHIPPED | OUTPATIENT
Start: 2024-12-27

## 2024-12-27 RX ORDER — CARVEDILOL 12.5 MG/1
12.5 TABLET ORAL 2 TIMES DAILY
Qty: 180 TABLET | Refills: 1 | Status: SHIPPED | OUTPATIENT
Start: 2024-12-27

## 2024-12-27 NOTE — PROGRESS NOTES
Subjective   Patient ID: Lauren Coon is a 59 y.o. female who presents for Leg Swelling and Sinus Problem.  HPI    Patient presents in office for both legs edema and sinus problems. Edema ongoing x on and off for the last two years especially with weather change. Sinus problems ongoing x the last 3 weeks. Edema symptoms include hurting, painful both to the touch as well as walking. Patient states she . Has tried keeping her leg elevated at night, states that it used to help but not so much anymore.    Sinus symptoms include redness, sinus pressure, running nose, and congestion.Has tried zpak. Mild relief but is still lingering.     Patient also states that she is always cold. No matter how much clothing she puts on she is alway cold.     Patient would like to discuss her medication colestipol.   No other complaints.     Review of Systems  Constitutional:  no chills, no fever and no night sweats.  Eyes: no blurred vision and no eyesight problems.  ENT: no hearing loss, no nasal congestion, no hoarseness and no sore throat.  Neck: no mass (es) and no swelling.  Cardiovascular: no chest pain, no intermittent leg claudication, no lower extremity edema, no palpitation and no syncope.  Respiratory: no cough, no shortness of breath during exertion, no shortness of breath at rest and no wheezing.  Gastrointestinal: no abdominal pain, no blood in stools, no constipation, no diarrhea, no melena, no nausea, no rectal pain and no vomiting.  Genitourinary: no dysuria, no change in urinary frequency, no urinary hesitancy and no feelings of urinary urgency.  Musculoskeletal: no arthralgias, no back pain and no myalgias.  Integumentary: no new skin lesions and no rashes.  Neurological: no difficulty walking, no headache, no limb weakness, no numbness and no tingling.  Psychiatric/Behavioral: no anxiety, no depression, no anhedonia and no substance use disorders.  Endocrine: no recent weight gain and no recent weight  "loss.  Hematologic/Lymphatic: no tendency for easy bruising and no swollen glands    Objective   Physical Exam  Patient in for follow-up chronic sinus congestion and on a few rounds of antibiotics no evidence of bacterial sinus infection will refer to allergy for this problem.  Also continues to have the peripheral edema which is significant never goes away completely lungs are clear cardiac and abdominal exams are benign.  No evidence of heart failure at this point Willet Lasix down to 20 mg send her to lymphedema clinic for evaluation  /90 (BP Location: Left arm, Patient Position: Sitting, BP Cuff Size: Adult)   Pulse 81   Temp 36.4 °C (97.5 °F) (Temporal)   Ht 1.676 m (5' 6\")   Wt 113 kg (250 lb)   SpO2 96%   BMI 40.35 kg/m²     Lab Results   Component Value Date    WBC 10.4 02/01/2024    HGB 14.1 02/01/2024    HCT 44.8 02/01/2024    MCV 92 02/01/2024     02/01/2024       Assessment/Plan   Problem List Items Addressed This Visit       Hypertension     Other Visit Diagnoses       Numbness and tingling of left leg        BMI 40.0-44.9, adult (Multi)        Class 3 severe obesity due to excess calories with body mass index (BMI) of 40.0 to 44.9 in adult, unspecified whether serious comorbidity present              "

## 2025-01-08 ENCOUNTER — TELEPHONE (OUTPATIENT)
Dept: PRIMARY CARE | Facility: CLINIC | Age: 60
End: 2025-01-08
Payer: COMMERCIAL

## 2025-01-08 NOTE — TELEPHONE ENCOUNTER
Have her get an appointment so we can talk in detail     Patient aware. Will schedule an appointment tomorrow.

## 2025-01-08 NOTE — TELEPHONE ENCOUNTER
Please advise patient message     Lauren Coon [id number] [id number] Clinical Support Staff  Phone Number: [phone]     Saw Dr Day 12/27. I continue to be extremely tired. Can we get any additional lab work vitamin d & b 12? Or anything else you think I might need?  Also my right leg is itching something awful. I have tried different otc moisturizers. Still swollen and getting a little red. Also both legs continue to hurt and are painful at times.  I am concerned because I have had both knees replaced. I am due to go to the lymphedema clinic on 2/10.  One last thing. Also I have noticed that in the evening after I take my bp pill hours later I notice my heart is beating hard. It has woke me up at night.  Do you think I need to increase dosage of my coreg? I have been on 12.5 mg for a long time. Could that be why I feel so tired? My heart is working harder? Please let me know your thoughts or I can schedule another appt.  Thank you!

## 2025-01-09 ENCOUNTER — OFFICE VISIT (OUTPATIENT)
Dept: PRIMARY CARE | Facility: CLINIC | Age: 60
End: 2025-01-09
Payer: COMMERCIAL

## 2025-01-09 VITALS
BODY MASS INDEX: 40.35 KG/M2 | HEIGHT: 66 IN | RESPIRATION RATE: 19 BRPM | DIASTOLIC BLOOD PRESSURE: 90 MMHG | SYSTOLIC BLOOD PRESSURE: 138 MMHG

## 2025-01-09 DIAGNOSIS — R00.2 PALPITATIONS: ICD-10-CM

## 2025-01-09 DIAGNOSIS — R53.83 OTHER FATIGUE: ICD-10-CM

## 2025-01-09 DIAGNOSIS — R22.43 LOCALIZED SWELLING OF BOTH LOWER LEGS: Primary | ICD-10-CM

## 2025-01-09 DIAGNOSIS — N95.1 MENOPAUSAL STATE: ICD-10-CM

## 2025-01-09 DIAGNOSIS — L29.9 ITCHING: ICD-10-CM

## 2025-01-09 PROCEDURE — 99214 OFFICE O/P EST MOD 30 MIN: CPT | Performed by: NURSE PRACTITIONER

## 2025-01-09 PROCEDURE — 3075F SYST BP GE 130 - 139MM HG: CPT | Performed by: NURSE PRACTITIONER

## 2025-01-09 PROCEDURE — 3080F DIAST BP >= 90 MM HG: CPT | Performed by: NURSE PRACTITIONER

## 2025-01-09 PROCEDURE — 93000 ELECTROCARDIOGRAM COMPLETE: CPT | Performed by: NURSE PRACTITIONER

## 2025-01-09 RX ORDER — TRIAMCINOLONE ACETONIDE 5 MG/G
CREAM TOPICAL 3 TIMES DAILY
Qty: 15 G | Refills: 1 | Status: SHIPPED | OUTPATIENT
Start: 2025-01-09

## 2025-01-09 ASSESSMENT — ENCOUNTER SYMPTOMS
CHILLS: 0
UNEXPECTED WEIGHT CHANGE: 0
FEVER: 0
PALPITATIONS: 1
SHORTNESS OF BREATH: 1
WOUND: 0
COUGH: 0
DIZZINESS: 0
APPETITE CHANGE: 0
FATIGUE: 1
DIAPHORESIS: 0
BACK PAIN: 0
FACIAL ASYMMETRY: 0
NECK PAIN: 0
ACTIVITY CHANGE: 0
CONFUSION: 0
ABDOMINAL PAIN: 0
MYALGIAS: 0
CHEST TIGHTNESS: 0
NERVOUS/ANXIOUS: 0
WHEEZING: 0
HEADACHES: 0

## 2025-01-09 NOTE — PROGRESS NOTES
Subjective   Patient ID: Lauren Coon is a 59 y.o. female who presents for Leg Swelling, Shortness of Breath, leg redness, and Itching.    HPI  Patient presents in office today for bilateral leg swelling, itching and redness. This is an ongoing issue. Patient admits that she has seen Dr. Day for this a few times in the recent past. Tried taking Lasix 40 mg daily instead of usual 20 mg for several weeks but there was no improvement. Maged Dya MD has referred her to follow-up with the lymphedema clinic for further assessment and management, but she is not able to start there until next month.  Meanwhile symptoms continue to slowly worsen. Patient has tried OTC lotions for the itching, which has not been helpful.  No obvious rash.  Swelling improves with elevation/throughout the night, then progressively worsened throughout each day.    Patient admits that her heart feels like it fluttering and she has had an intermittent increase in shortness of breath. Has chronic sinus congestion since covid infection last year. Patient admits that she has had some increased fatigue.  Patient admits she has been losing her hair.    The patient's allergies, medications, and past medical/surgical/family history were reviewed with them today and updated as indicated.    Review of Systems   Constitutional:  Positive for fatigue. Negative for activity change, appetite change, chills, diaphoresis, fever and unexpected weight change.   HENT:  Positive for congestion.    Respiratory:  Positive for shortness of breath. Negative for cough, chest tightness and wheezing.    Cardiovascular:  Positive for palpitations and leg swelling. Negative for chest pain.   Gastrointestinal:  Negative for abdominal pain.   Musculoskeletal:  Negative for back pain, myalgias and neck pain.   Skin:  Negative for rash and wound.   Allergic/Immunologic: Negative for environmental allergies.   Neurological:  Negative for dizziness, syncope,  "facial asymmetry and headaches.   Psychiatric/Behavioral:  Negative for confusion. The patient is not nervous/anxious.       Objective   Vital Signs: /90   Resp 19   Ht 1.676 m (5' 6\")   BMI 40.35 kg/m²     Physical Exam  Vitals and nursing note reviewed.   Constitutional:       General: She is not in acute distress.     Appearance: Normal appearance. She is not ill-appearing.   HENT:      Head: Normocephalic and atraumatic.      Right Ear: External ear normal.      Left Ear: External ear normal.      Nose: No congestion or rhinorrhea.      Mouth/Throat:      Mouth: Mucous membranes are moist.      Pharynx: No oropharyngeal exudate or posterior oropharyngeal erythema.   Eyes:      General:         Right eye: No discharge.         Left eye: No discharge.      Extraocular Movements: Extraocular movements intact.      Conjunctiva/sclera: Conjunctivae normal.      Pupils: Pupils are equal, round, and reactive to light.   Cardiovascular:      Rate and Rhythm: Normal rate and regular rhythm.      Heart sounds: No murmur heard.  Pulmonary:      Effort: Pulmonary effort is normal. No respiratory distress.      Breath sounds: Normal breath sounds and air entry.   Abdominal:      General: Bowel sounds are normal.      Palpations: Abdomen is soft.   Musculoskeletal:      Right lower le+ Pitting Edema present.      Left lower le+ Pitting Edema present.   Skin:     General: Skin is warm and dry.      Coloration: Skin is not jaundiced.      Findings: Erythema (faint patchy erythema on BLE between knee and ankle) present. No rash.   Neurological:      General: No focal deficit present.      Mental Status: She is alert. Mental status is at baseline.   Psychiatric:         Mood and Affect: Mood normal.         Behavior: Behavior normal.         Thought Content: Thought content normal.     POCT today: No results found for this visit on 25.     Assessment & Plan  1. Localized swelling of both lower legs  " Continue Lasix 20 mg daily.  Continue plan to establish with lymphedema clinic next month.  Start wearing compression stockings 10 to 12 mmHg.  Keep feet/legs elevated as much as possible throughout the day.  Transthoracic Echo (TTE) Complete  Vascular US bilateral lower extremities      2. Itching  triamcinolone (Kenalog) 0.5 % cream  -Pick a side of one leg to trial this on, then let me know whether or not it is helpful.      3. Palpitations  ECG 12 lead (Clinic Performed)  Recommended testing for possible SURESH, as patient does admit to snoring and fatigue as per below.  Denies waking up gasping/choking at night.  Opts to defer testing at this time.      4. Other fatigue  Will check for rheumatology/deficiency sources of fatigue.  Ferritin    Iron and TIBC    Vitamin B12    Vitamin D 25-Hydroxy,Total (for eval of Vitamin D levels)    HOLLY with Reflex to CIRA    Rheumatoid Factor    Sedimentation Rate    C-Reactive Protein      5. Menopausal state  Post partial hysterectomy at age 41.  Has not experienced any traditional menopause symptoms.  Will check on menopausal state.  FSH & LH      Reviewed treatment options and health maintenance. Take medications as prescribed. We will contact you with the results of any ordered testing; please send a [employer] message or call the office if you do not hear from us.     Maryann Conn, APRN-CNP, DNP, CCRN  Family Nurse Practitioner  [facility]

## 2025-01-14 ENCOUNTER — HOSPITAL ENCOUNTER (OUTPATIENT)
Dept: RADIOLOGY | Facility: HOSPITAL | Age: 60
Discharge: HOME | End: 2025-01-14
Payer: COMMERCIAL

## 2025-01-14 ENCOUNTER — LAB (OUTPATIENT)
Dept: LAB | Facility: LAB | Age: 60
End: 2025-01-14
Payer: COMMERCIAL

## 2025-01-14 DIAGNOSIS — R53.83 OTHER FATIGUE: ICD-10-CM

## 2025-01-14 DIAGNOSIS — R22.43 LOCALIZED SWELLING OF BOTH LOWER LEGS: ICD-10-CM

## 2025-01-14 DIAGNOSIS — N95.1 MENOPAUSAL STATE: ICD-10-CM

## 2025-01-14 LAB
25(OH)D3 SERPL-MCNC: <7 NG/ML (ref 30–100)
CRP SERPL-MCNC: 2.52 MG/DL
ERYTHROCYTE [SEDIMENTATION RATE] IN BLOOD BY WESTERGREN METHOD: 9 MM/H (ref 0–30)
FERRITIN SERPL-MCNC: 210 NG/ML (ref 8–150)
IRON SATN MFR SERPL: 14 % (ref 25–45)
IRON SERPL-MCNC: 43 UG/DL (ref 35–150)
TIBC SERPL-MCNC: 314 UG/DL (ref 240–445)
UIBC SERPL-MCNC: 271 UG/DL (ref 110–370)
VIT B12 SERPL-MCNC: 221 PG/ML (ref 211–911)

## 2025-01-14 PROCEDURE — 86235 NUCLEAR ANTIGEN ANTIBODY: CPT

## 2025-01-14 PROCEDURE — 86038 ANTINUCLEAR ANTIBODIES: CPT

## 2025-01-14 PROCEDURE — 93970 EXTREMITY STUDY: CPT | Performed by: RADIOLOGY

## 2025-01-14 PROCEDURE — 83002 ASSAY OF GONADOTROPIN (LH): CPT

## 2025-01-14 PROCEDURE — 86431 RHEUMATOID FACTOR QUANT: CPT

## 2025-01-14 PROCEDURE — 93970 EXTREMITY STUDY: CPT

## 2025-01-14 PROCEDURE — 83001 ASSAY OF GONADOTROPIN (FSH): CPT

## 2025-01-14 PROCEDURE — 86225 DNA ANTIBODY NATIVE: CPT

## 2025-01-15 LAB
FSH SERPL-ACNC: 30.1 IU/L
LH SERPL-ACNC: 14.4 IU/L
RHEUMATOID FACT SER NEPH-ACNC: <10 IU/ML (ref 0–15)

## 2025-01-16 ENCOUNTER — PATIENT MESSAGE (OUTPATIENT)
Dept: PRIMARY CARE | Facility: CLINIC | Age: 60
End: 2025-01-16
Payer: COMMERCIAL

## 2025-01-17 LAB
ANA PATTERN: ABNORMAL
ANA SER QL HEP2 SUBST: POSITIVE
ANA TITR SER IF: ABNORMAL {TITER}
CENTROMERE B AB SER-ACNC: <0.2 AI
CHROMATIN AB SERPL-ACNC: <0.2 AI
DSDNA AB SER-ACNC: <1 IU/ML
ENA JO1 AB SER QL IA: <0.2 AI
ENA RNP AB SER IA-ACNC: 0.3 AI
ENA SCL70 AB SER QL IA: <0.2 AI
ENA SM AB SER IA-ACNC: <0.2 AI
ENA SM+RNP AB SER QL IA: <0.2 AI
ENA SS-A AB SER IA-ACNC: <0.2 AI
ENA SS-B AB SER IA-ACNC: <0.2 AI
RIBOSOMAL P AB SER-ACNC: <0.2 AI

## 2025-01-20 ENCOUNTER — HOSPITAL ENCOUNTER (OUTPATIENT)
Dept: CARDIOLOGY | Facility: HOSPITAL | Age: 60
Discharge: HOME | End: 2025-01-20
Payer: COMMERCIAL

## 2025-01-20 DIAGNOSIS — R60.0 LOCALIZED EDEMA: ICD-10-CM

## 2025-01-20 DIAGNOSIS — R22.43 LOCALIZED SWELLING OF BOTH LOWER LEGS: ICD-10-CM

## 2025-01-20 LAB
AORTIC VALVE MEAN GRADIENT: 5 MMHG
AORTIC VALVE PEAK VELOCITY: 1.44 M/S
AV PEAK GRADIENT: 8 MMHG
AVA (PEAK VEL): 2.86 CM2
AVA (VTI): 2.5 CM2
EJECTION FRACTION APICAL 4 CHAMBER: 59.8
EJECTION FRACTION: 60 %
LEFT ATRIUM VOLUME AREA LENGTH INDEX BSA: 36.2 ML/M2
LEFT VENTRICLE INTERNAL DIMENSION DIASTOLE: 4.69 CM (ref 3.5–6)
LEFT VENTRICULAR OUTFLOW TRACT DIAMETER: 2.1 CM
LV EJECTION FRACTION BIPLANE: 62 %
MITRAL VALVE E/A RATIO: 0.89
MITRAL VALVE E/E' RATIO: 14.3
RIGHT VENTRICLE FREE WALL PEAK S': 10.5 CM/S
RIGHT VENTRICLE PEAK SYSTOLIC PRESSURE: 25.8 MMHG
TRICUSPID ANNULAR PLANE SYSTOLIC EXCURSION: 2.1 CM

## 2025-01-20 PROCEDURE — C8929 TTE W OR WO FOL WCON,DOPPLER: HCPCS

## 2025-01-20 PROCEDURE — 2500000004 HC RX 250 GENERAL PHARMACY W/ HCPCS (ALT 636 FOR OP/ED): Performed by: NURSE PRACTITIONER

## 2025-01-20 PROCEDURE — 93306 TTE W/DOPPLER COMPLETE: CPT | Performed by: INTERNAL MEDICINE

## 2025-01-20 RX ADMIN — PERFLUTREN 1.5 ML OF DILUTION: 6.52 INJECTION, SUSPENSION INTRAVENOUS at 08:55

## 2025-01-21 ENCOUNTER — TELEPHONE (OUTPATIENT)
Dept: SURGERY | Facility: CLINIC | Age: 60
End: 2025-01-21

## 2025-01-21 ENCOUNTER — TELEMEDICINE (OUTPATIENT)
Dept: PRIMARY CARE | Facility: CLINIC | Age: 60
End: 2025-01-21
Payer: COMMERCIAL

## 2025-01-21 VITALS — WEIGHT: 250 LBS | BODY MASS INDEX: 40.35 KG/M2

## 2025-01-21 DIAGNOSIS — R00.2 PALPITATIONS: ICD-10-CM

## 2025-01-21 DIAGNOSIS — R60.0 BILATERAL EDEMA OF LOWER EXTREMITY: Primary | ICD-10-CM

## 2025-01-21 DIAGNOSIS — E55.9 VITAMIN D DEFICIENCY: ICD-10-CM

## 2025-01-21 DIAGNOSIS — R53.83 FATIGUE, UNSPECIFIED TYPE: ICD-10-CM

## 2025-01-21 DIAGNOSIS — I10 HYPERTENSION, UNSPECIFIED TYPE: ICD-10-CM

## 2025-01-21 DIAGNOSIS — R60.0 BILATERAL EDEMA OF LOWER EXTREMITY: ICD-10-CM

## 2025-01-21 DIAGNOSIS — L29.9 ITCHING: ICD-10-CM

## 2025-01-21 DIAGNOSIS — R22.43 LOCALIZED SWELLING OF BOTH LOWER LEGS: ICD-10-CM

## 2025-01-21 DIAGNOSIS — E55.9 VITAMIN D DEFICIENCY: Primary | ICD-10-CM

## 2025-01-21 DIAGNOSIS — E78.2 MIXED HYPERLIPIDEMIA: ICD-10-CM

## 2025-01-21 PROCEDURE — 99214 OFFICE O/P EST MOD 30 MIN: CPT | Performed by: NURSE PRACTITIONER

## 2025-01-21 RX ORDER — FUROSEMIDE 20 MG/1
20 TABLET ORAL DAILY
Start: 2025-01-21 | End: 2025-01-24 | Stop reason: SDUPTHER

## 2025-01-21 RX ORDER — ERGOCALCIFEROL 1.25 MG/1
50000 CAPSULE ORAL WEEKLY
Qty: 6 CAPSULE | Refills: 0 | Status: SHIPPED | OUTPATIENT
Start: 2025-01-21 | End: 2025-03-04

## 2025-01-21 RX ORDER — VALSARTAN 80 MG/1
80 TABLET ORAL DAILY
Qty: 90 TABLET | Refills: 1 | Status: SHIPPED | OUTPATIENT
Start: 2025-01-21

## 2025-01-21 NOTE — PROGRESS NOTES
Subjective   Patient ID: Lauren Coon is a 59 y.o. female who presents for No chief complaint on file..    HPI[unfilled]    Patient is present today for virtual.   Patient was present in office on 1/09/2025 for bilateral leg swelling  Patient stated she had itching and redness also that her heart felt fluttering.   Patient got patient feeling today her feet are still swelling and she is till exhausted and no more flutters.   Patient would like to review echo and lab work results    Legs get better if she lays down, otherwise are unchanged throughout the day.  Still painful and itchy.  Compression socks horrible  3 pm raquel appt     The patient's allergies, medications, and past medical/surgical/family history were reviewed with them today and updated as indicated.  ---  Additional HPI  ***  Review of Systems   Objective   Vital Signs: There were no vitals taken for this visit.    Physical Exam   POCT today: No results found for this visit on 01/21/25.     Assessment & Plan  No diagnosis found.  Reviewed treatment options and health maintenance. Take medications as prescribed. We will contact you with the results of any ordered testing; please send a [employer] message or call the office if you do not hear from us. Follow up in the office in *** months/as previously discussed with your PCP. Return sooner if symptoms do not resolve as expected.     Maryann Conn, ZAIDA-CNP, DNP, CCRN  Family Nurse Practitioner  [facility]     Objective   Vital Signs: Wt 113 kg (250 lb)   BMI 40.35 kg/m²     Physical Exam  Constitutional:       General: She is awake. She is not in acute distress.     Appearance: She is not ill-appearing.   Pulmonary:      Effort: No tachypnea, accessory muscle usage or respiratory distress.   Skin:     Coloration: Skin is not cyanotic or jaundiced.   Neurological:      Mental Status: She is alert.   Psychiatric:         Behavior: Behavior is cooperative.     POCT today: No results found for this visit on 01/21/25.     Assessment & Plan  1. Bilateral edema of lower extremity  furosemide (Lasix) 20 mg tablet -  Take 2 tablets PO (40 mg) Tues/Thurs/Sat. Take 1 tablet PO (20 mg) Mon/Wed/Fri/Sun.       2. Hypertension, unspecified type  Start valsartan (Diovan) 80 mg tablet daily  Continue Coreg 12.5 mg BID      3. Palpitations  Referral to Cardiology      4. Vitamin D deficiency  ergocalciferol (Vitamin D-2) 1.25 MG (69592 UT) capsule    Vitamin D 25-Hydroxy,Total (for eval of Vitamin D levels) in 6 weeks      5. Fatigue, unspecified type  ergocalciferol (Vitamin D-2) 1.25 MG (81792 UT) capsule      6. Mixed hyperlipidemia  Dietary modifications recommended.      Reviewed treatment options and health maintenance. Take medications as prescribed. We will contact you with the results of any ordered testing; please send a [employer] message or call the office if you do not hear from us. Follow up in the office as previously discussed with your PCP. Return sooner if symptoms do not resolve as expected.     Maryann Conn, APRN-CNP, DNP, CCRN  Family Nurse Practitioner  [facility]

## 2025-01-24 ENCOUNTER — TELEPHONE (OUTPATIENT)
Dept: PRIMARY CARE | Facility: CLINIC | Age: 60
End: 2025-01-24
Payer: COMMERCIAL

## 2025-01-24 DIAGNOSIS — E55.9 VITAMIN D DEFICIENCY: Primary | ICD-10-CM

## 2025-01-24 DIAGNOSIS — R60.0 BILATERAL EDEMA OF LOWER EXTREMITY: ICD-10-CM

## 2025-01-24 RX ORDER — FUROSEMIDE 20 MG/1
TABLET ORAL
Qty: 120 TABLET | Refills: 0 | Status: SHIPPED | OUTPATIENT
Start: 2025-01-24

## 2025-01-24 NOTE — TELEPHONE ENCOUNTER
PLEASE ADVISE    Lauren CARRERA Do [id number] [facility] Clinical Support Staff (supporting Maryann Conn, APRN-CNP, DNP)4 minutes ago (11:50 AM)       Couple of things:     When did I need to get my Vitamin D rechecked?     I have had to increase my Furosemide 20 mg daily to   40 mg three days a week. and i am running low. Wondering if we could send another prescription to the pharmacy with the new directions so i can get it refilled.  [facility] in [place]     also just wondering about my LH & FSH lab tests. where am i at with menopause?      Thank you for all your help!  Everyone in your office is great! :-)

## 2025-01-27 ENCOUNTER — TELEPHONE (OUTPATIENT)
Dept: PRIMARY CARE | Facility: CLINIC | Age: 60
End: 2025-01-27
Payer: COMMERCIAL

## 2025-01-27 NOTE — TELEPHONE ENCOUNTER
Sorry. Just making sure I understand. I am currently in menopause? Just the beginning? Or it’s all over?    Name band;

## 2025-01-28 ENCOUNTER — TELEPHONE (OUTPATIENT)
Dept: PRIMARY CARE | Facility: CLINIC | Age: 60
End: 2025-01-28
Payer: COMMERCIAL

## 2025-01-28 DIAGNOSIS — E55.9 VITAMIN D DEFICIENCY: ICD-10-CM

## 2025-01-28 NOTE — TELEPHONE ENCOUNTER
PLEASE ADVISE    Lauren CARRERA [id number] [place] [address] Clinical Support Staff (supporting Maryann Conn, APRN-CNP, DNP)2 minutes ago (5:17 PM)       Oh boy! I am 59 years old. When will it be over? Is there a typical age when it’s over with?

## 2025-01-30 PROBLEM — R60.0 BILATERAL EDEMA OF LOWER EXTREMITY: Status: ACTIVE | Noted: 2025-01-30

## 2025-01-30 PROBLEM — R00.2 PALPITATIONS: Status: ACTIVE | Noted: 2025-01-30

## 2025-01-30 PROBLEM — E78.2 MIXED HYPERLIPIDEMIA: Status: ACTIVE | Noted: 2025-01-30

## 2025-01-30 ASSESSMENT — ENCOUNTER SYMPTOMS
WHEEZING: 0
ACTIVITY CHANGE: 0
DIZZINESS: 0
ABDOMINAL PAIN: 0
SHORTNESS OF BREATH: 1
FATIGUE: 1
FACIAL ASYMMETRY: 0
WOUND: 0
DIAPHORESIS: 0
BACK PAIN: 0
UNEXPECTED WEIGHT CHANGE: 0
PALPITATIONS: 0
CHEST TIGHTNESS: 0
HEADACHES: 0
CHILLS: 0
APPETITE CHANGE: 0
CONFUSION: 0
MYALGIAS: 0
NERVOUS/ANXIOUS: 0
COUGH: 0
FEVER: 0
NECK PAIN: 0

## 2025-02-03 ENCOUNTER — APPOINTMENT (OUTPATIENT)
Dept: CARDIOLOGY | Facility: CLINIC | Age: 60
End: 2025-02-03
Payer: COMMERCIAL

## 2025-02-03 VITALS
SYSTOLIC BLOOD PRESSURE: 148 MMHG | HEART RATE: 83 BPM | WEIGHT: 287.6 LBS | DIASTOLIC BLOOD PRESSURE: 88 MMHG | BODY MASS INDEX: 46.22 KG/M2 | HEIGHT: 66 IN

## 2025-02-03 DIAGNOSIS — R60.0 BILATERAL EDEMA OF LOWER EXTREMITY: Primary | ICD-10-CM

## 2025-02-03 DIAGNOSIS — R06.02 SHORTNESS OF BREATH: ICD-10-CM

## 2025-02-03 DIAGNOSIS — I10 HYPERTENSION, UNSPECIFIED TYPE: ICD-10-CM

## 2025-02-03 DIAGNOSIS — R00.2 PALPITATIONS: ICD-10-CM

## 2025-02-03 DIAGNOSIS — E88.810 METABOLIC SYNDROME: ICD-10-CM

## 2025-02-03 DIAGNOSIS — E78.2 MIXED HYPERLIPIDEMIA: ICD-10-CM

## 2025-02-03 DIAGNOSIS — I50.32 CHF (CONGESTIVE HEART FAILURE), NYHA CLASS II, CHRONIC, DIASTOLIC: ICD-10-CM

## 2025-02-03 DIAGNOSIS — E66.01 MORBID OBESITY WITH BODY MASS INDEX (BMI) OF 40.0 TO 44.9 IN ADULT (MULTI): ICD-10-CM

## 2025-02-03 PROCEDURE — 3077F SYST BP >= 140 MM HG: CPT | Performed by: INTERNAL MEDICINE

## 2025-02-03 PROCEDURE — 99205 OFFICE O/P NEW HI 60 MIN: CPT | Performed by: INTERNAL MEDICINE

## 2025-02-03 PROCEDURE — 3079F DIAST BP 80-89 MM HG: CPT | Performed by: INTERNAL MEDICINE

## 2025-02-03 PROCEDURE — 3008F BODY MASS INDEX DOCD: CPT | Performed by: INTERNAL MEDICINE

## 2025-02-03 PROCEDURE — 1036F TOBACCO NON-USER: CPT | Performed by: INTERNAL MEDICINE

## 2025-02-03 RX ORDER — ACETAMINOPHEN 500 MG
2 TABLET ORAL 2 TIMES DAILY
COMMUNITY

## 2025-02-03 RX ORDER — VALSARTAN 160 MG/1
160 TABLET ORAL DAILY
Qty: 90 TABLET | Refills: 3 | Status: SHIPPED | OUTPATIENT
Start: 2025-02-03 | End: 2026-02-03

## 2025-02-03 NOTE — PROGRESS NOTES
Patient:  Lauren Coon  YOB: 1965  MRN: [id number]       Impression/Plan:     Diagnoses and all orders for this visit:  Bilateral edema of lower extremity  -     Extremity edema is multifactorial  -     She is on twice daily ibuprofen which significantly increases risk of edema, fluid retention, sodium retention and worsening hypertension.  She should avoid nonsteroidal anti-inflammatories except on an occasional as needed basis.  -     She has morbid obesity increasing likelihood of venous insufficiency cannot completely exclude a component of lymphedema venous insufficiency probably more likely contributor in addition to the diastolic CHF  -     Blood pressure suboptimally controlled and she has class II diastolic CHF which is also contributing to increased venous pressures.  Treatment options will be listed below    CHF (congestive heart failure), NYHA class II, chronic, diastolic  Shortness of breath  -     Shortness of breath is related to mild diastolic CHF, considerable deconditioning and morbid obesity  -     Discontinuation of nonsteroidal should help some of the volume overload which is relatively mild.  Would be more aggressive and control of blood pressure increasing valsartan to 160 daily with laboratory studies in the near future to assure no worsening of renal function  -     She will research whether or not Jardiance is covered on her insurance this would be an ideal drug and that she does have metabolic syndrome  Hypertension, unspecified type  -     Follow Up In Cardiology; Future  -     valsartan (Diovan) 160 mg tablet; Take 1 tablet (160 mg) by mouth once daily.  -     Increase dose of Diovan as described  Palpitations  -     Await Holter to assess cause of symptoms  -     Holter Or Event Cardiac Monitor; Future  Metabolic syndrome  Mixed hyperlipidemia  Morbid obesity with body mass index (BMI) of 40.0 to 44.9 in adult (Multi)  -     She meets 4 out of 5 criteria for metabolic  syndrome including obesity, hypertension, low HDL for a woman and elevated fasting glucose.  -      She needs to markedly adjust her diet avoiding processed foods, foods that have sugars added, excess carbohydrates some of these were discussed with her.  -      Obtain lipid profile has had none in the recent past because of the metabolic syndrome and increased risk of coronary disease will strongly consider statin if LDL is in excess of 70 particularly if calcium score is particularly elevated  -     Reviewed metabolic syndrome and the high likelihood of development of diabetes particular with her obesity and her strong family history of diabetes.  She understands the importance of following closely with primary care as obesity diabetes if unchecked lead to vascular disease.  -     Lipid Panel; Future  -     CT cardiac scoring wo IV contrast; Future        Chief Complaint/Active Symptoms:       Lauren Coon is a 59 y.o. female who presents as new consultation for discussion of lower extremity edema as well as abnormal echocardiogram.    Recently seen in primary care office with complaints of edema.  Venous duplex showed no evidence of thrombotic disease.  Echocardiogram suggested diastolic dysfunction mild left atrial dilatation minimal mitral regurgitation/tricuspid regurgitation.    She has had no prior cardiovascular history without history of myocardial infarction, stroke or peripheral vascular disease.  She notes over the last several months her edema seems to persist most of the time in the past it had improved somewhat in the morning.  Also notes in the last couple of months going up the stairs causes shortness of breath on occasion although not always.  Valsartan was recently increased because of hypertension.  She also notes sometimes usually every day but sometimes every other day her heart seems to be irregular.  There is no associated dizziness, lightheadedness or syncope.    ECG 1/9/2025 NSR LAHB   (I personally reviewed)    Echocardiogram 1/20/2025   1. The left ventricular systolic function is normal, with a visually estimated ejection fraction of 60%.   2. Spectral Doppler shows a Grade I (impaired relaxation pattern) of left ventricular diastolic filling with normal left atrial filling pressure.   3. There is normal right ventricular global systolic function.   4. The left atrium is mild to moderately dilated.   5. There is no evidence of mitral valve stenosis.   6. Mild mitral valve regurgitation.   7. Mild tricuspid regurgitation is visualized.   8. Aortic valve stenosis is not present.   9. The main pulmonary artery is normal in size, and position, with normal bifurcation into the left and right pulmonary arteries.  10. There is moderately increased septal and moderately increased posterior left ventricular wall thickness.    Venous duplex 1/14/2025  No thrombi       Lab work December 2024 normal thyroid    CMP unremarkable except glucose 119 hemoglobin A1c 5.9    February 2024 cholesterol 185 HDL 35  triglycerides 146    Past medical history  Hypertension  Hyperlipidemia    Past Surg his  Hysterect, ovary intact'  C section x 2.    Cholecystect  Hernia  Back/foot  Bilat hip    Fam Hx  Mother with htn  Pgm cva elderly  Father (and others) diabetes    Soc Hx  No tobac or etoh  Lives with daughter    Review of Systems: Unremarkable except as noted above    Meds     Current Outpatient Medications   Medication Instructions    acetaminophen (Tylenol) 500 mg tablet 2 tablets, 2 times daily    carvedilol (COREG) 12.5 mg, oral, 2 times daily    colestipol (COLESTID) 1 g, oral, 3 times daily, Take at least 1 hour after or 4 hours before other medications.    cyclobenzaprine (FLEXERIL) 10 mg, oral, 3 times daily PRN    ergocalciferol (VITAMIN D-2) 50,000 Units, oral, Weekly    furosemide (Lasix) 20 mg tablet Take 2 tablets PO (40 mg) Tues/Thurs/Sat. Take 1 tablet PO (20 mg) Mon/Wed/Fri/Sun.    gabapentin  "(NEURONTIN) 600 mg, oral, Nightly    ibuprofen 800 mg tablet TAKE 1 TABLET BY MOUTH 2 TIMES A DAY AS NEEDED FOR MILD PAIN (PAIN SCALE 1 TO 3)    triamcinolone (Kenalog) 0.5 % cream Topical, 3 times daily    valsartan (DIOVAN) 80 mg, oral, Daily        Allergies     Allergies   Allergen Reactions    Morphine Itching     Itching really bad    Kiwi (Actinidia Chinensis) Unknown     Lip swelling    Lactose Diarrhea and Unknown     cramps    Methylprednisolone Unknown     MDP - shoots her BP up and gives her a rash    Rofecoxib Hives and Unknown    Valdecoxib Unknown     pt states she can take motrin, aleve, and toradol without any problems/reactions         Annotated Problems     Specialty Problems          Cardiology Problems    Hypertension    Mixed hyperlipidemia    Palpitations        Problem List     Patient Active Problem List    Diagnosis Date Noted    Bilateral edema of lower extremity 01/30/2025    Palpitations 01/30/2025    Mixed hyperlipidemia 01/30/2025    Vitamin D deficiency 01/24/2025    Localized swelling of both lower legs 01/09/2025    Itching 01/09/2025    Elevated hemoglobin A1c 03/12/2024    Arthritis 05/22/2023    Fatigue 05/22/2023    Nerve pain 05/22/2023    History of lumbar spinal fusion 05/22/2023    Hypertension 05/22/2023    Lumbar radiculopathy 05/22/2023    Status post total knee replacement, right 09/28/2018    Osteoarthritis of spine with radiculopathy, lumbar region 06/27/2017    Degenerative spondylolisthesis 06/06/2017       Objective:     Vitals:    02/03/25 0829   BP: 148/88   BP Location: Right arm   Patient Position: Sitting   Pulse: 83   Weight: 130 kg (287 lb 9.6 oz)   Height: 1.676 m (5' 6\")      Wt Readings from Last 4 Encounters:   02/03/25 130 kg (287 lb 9.6 oz)   01/21/25 113 kg (250 lb)   12/27/24 113 kg (250 lb)   12/03/24 113 kg (250 lb)           LAB:     Lab Results   Component Value Date    WBC 10.4 02/01/2024    HGB 14.1 02/01/2024    HCT 44.8 02/01/2024     " 02/01/2024    CHOL 185 02/01/2024    TRIG 146 02/01/2024    HDL 35.1 02/01/2024    ALT 23 12/03/2024    AST 17 12/03/2024     12/03/2024    K 3.8 12/03/2024     12/03/2024    CREATININE 0.75 12/03/2024    BUN 17 12/03/2024    CO2 32 12/03/2024    TSH 1.13 02/25/2020    INR 1.0 09/07/2021    HGBA1C 5.9 (H) 12/03/2024       Diagnostic Studies:     Transthoracic Echo (TTE) Complete    Result Date: 1/20/2025          [place] [address]  Tel [phone] Fax [phone] TRANSTHORACIC ECHOCARDIOGRAM REPORT Patient Name:       SUNDAR ECKERT TREMAYNE Moore Physician:    [id number] Francois Chen DO Study Date:         1/20/2025           Ordering Provider:    [id number] JOHN RICHEY MRN/PID:            [id number]            Fellow: Accession#:         [id number]        Nurse:                Roger Graf RN Date of Birth/Age:  1965 / 59      Sonographer:          Dez griffith Gender Assigned at  F                   Additional Staff:     Latosha Martinez Birth:                                                        [facility] Height:             167.64 cm           Admit Date:           1/20/2025 Weight:             113.40 kg           Admission Status:     Outpatient BSA / BMI:          2.20 m2 / 40.35     Department Location:  [place] [address]                                     Echo Lab Blood Pressure: 138 /90 mmHg Study Type:    TRANSTHORACIC ECHO (TTE) COMPLETE Diagnosis/ICD: Localized edema-R60.0 Indication:    leg swelling, palpitations CPT Codes:     Echo Complete w Full [id number] Patient History: Pertinent History: Dyspnea, LE Edema and Palpitations. Study Detail: The following Echo studies were performed: 2D, M-Mode, Doppler and               color flow. Definity used as a contrast agent for  endocardial               border definition. Total contrast used for this procedure was 1.5               mL via IV push. The patient was awake.  PHYSICIAN INTERPRETATION: Left Ventricle: The left ventricular systolic function is normal, with a visually estimated ejection fraction of 60%. There is moderate concentric left ventricular hypertrophy. There are no regional wall motion abnormalities. The left ventricular cavity size is normal. There is moderately increased septal and moderately increased posterior left ventricular wall thickness. Spectral Doppler shows a Grade I (impaired relaxation pattern) of left ventricular diastolic filling with normal left atrial filling pressure. LV Wall Scoring: All segments are normal. Left Atrium: The left atrium is mild to moderately dilated. Right Ventricle: The right ventricle is normal in size. There is normal right ventricular global systolic function. Right Atrium: The right atrium is normal in size. Aortic Valve: The aortic valve appears structurally normal. There is no evidence of aortic valve stenosis. The aortic valve dimensionless index is 0.72. There is no evidence of aortic valve regurgitation. The peak instantaneous gradient of the aortic valve is 8 mmHg. The mean gradient of the aortic valve is 5 mmHg. Mitral Valve: The mitral valve is normal in structure. There is no evidence of mitral valve stenosis. There is normal mitral valve leaflet mobility. There is mild mitral valve regurgitation. Tricuspid Valve: The tricuspid valve is structurally normal. There is normal tricuspid valve leaflet mobility. There is mild tricuspid regurgitation. Pulmonic Valve: The pulmonic valve is structurally normal. There is no indication of pulmonic valve regurgitation. Pericardium: No pericardial effusion noted. Aorta: The aortic root is normal. Pulmonary Artery: The main pulmonary artery is normal in size, and position, with normal bifurcation into the left and right pulmonary  arteries. Systemic Veins: The inferior vena cava appears normal in size. In comparison to the previous echocardiogram(s): There are no prior studies on this patient for comparison purposes.  CONCLUSIONS:  1. The left ventricular systolic function is normal, with a visually estimated ejection fraction of 60%.  2. Spectral Doppler shows a Grade I (impaired relaxation pattern) of left ventricular diastolic filling with normal left atrial filling pressure.  3. There is normal right ventricular global systolic function.  4. The left atrium is mild to moderately dilated.  5. There is no evidence of mitral valve stenosis.  6. Mild mitral valve regurgitation.  7. Mild tricuspid regurgitation is visualized.  8. Aortic valve stenosis is not present.  9. The main pulmonary artery is normal in size, and position, with normal bifurcation into the left and right pulmonary arteries. 10. There is moderately increased septal and moderately increased posterior left ventricular wall thickness. QUANTITATIVE DATA SUMMARY:  2D MEASUREMENTS:            Normal Ranges: Ao Root d:       3.00 cm    (2.0-3.7cm) LAs:             4.70 cm    (2.7-4.0cm) IVSd:            1.34 cm    (0.6-1.1cm) LVPWd:           1.35 cm    (0.6-1.1cm) LVIDd:           4.69 cm    (3.9-5.9cm) LVIDs:           3.14 cm LV Mass Index:   113.3 g/m2 LV % FS          33.0 %  LA VOLUME:                    Normal Ranges: LA Vol A4C:        78.2 ml    (22+/-6mL/m2) LA Vol A2C:        80.5 ml LA Vol BP:         79.6 ml LA Vol Index A4C:  35.6ml/m2 LA Vol Index A2C:  36.6 ml/m2 LA Vol Index BP:   36.2 ml/m2 LA Area A4C:       23.4 cm2 LA Area A2C:       23.8 cm2 LA Major Axis A4C: 6.0 cm LA Major Axis A2C: 6.0 cm LA Volume Index:   33.6 ml/m2 LA Vol A4C:        73.0 ml LA Vol A2C:        75.0 ml LA Vol Index BSA:  33.6 ml/m2  RA VOLUME BY A/L METHOD:            Normal Ranges: RA Vol A4C:              47.2 ml    (8.3-19.5ml) RA Vol Index A4C:        21.5 ml/m2 RA Area A4C:              16.5 cm2 RA Major Axis A4C:       4.9 cm  AORTA MEASUREMENTS:         Normal Ranges: Asc Ao, d:          2.80 cm (2.1-3.4cm)  LV SYSTOLIC FUNCTION BY 2D PLANIMETRY (MOD):                      Normal Ranges: EF-A4C View:    60 % (>=55%) EF-A2C View:    64 % EF-Biplane:     62 % EF-Visual:      60 % LV EF Reported: 60 %  LV DIASTOLIC FUNCTION:           Normal Ranges: MV Peak E:             0.96 m/s  (0.7-1.2 m/s) MV Peak A:             1.09 m/s  (0.42-0.7 m/s) E/A Ratio:             0.89      (1.0-2.2) MV e'                  0.070 m/s (>8.0) MV lateral e'          0.07 m/s MV medial e'           0.07 m/s E/e' Ratio:            13.85     (<8.0)  MITRAL VALVE:          Normal Ranges: MV DT:        366 msec (150-240msec)  AORTIC VALVE:                     Normal Ranges: AoV Vmax:                1.44 m/s (<=1.7m/s) AoV Peak P.3 mmHg (<20mmHg) AoV Mean P.0 mmHg (1.7-11.5mmHg) LVOT Max Pasha:            1.19 m/s (<=1.1m/s) AoV VTI:                 33.40 cm (18-25cm) LVOT VTI:                24.10 cm LVOT Diameter:           2.10 cm  (1.8-2.4cm) AoV Area, VTI:           2.50 cm2 (2.5-5.5cm2) AoV Area,Vmax:           2.86 cm2 (2.5-4.5cm2) AoV Dimensionless Index: 0.72  RIGHT VENTRICLE: RV Basal 3.37 cm RV Mid   2.53 cm RV Major 8.0 cm TAPSE:   20.8 mm RV s'    0.10 m/s  TRICUSPID VALVE/RVSP:          Normal Ranges: Peak TR Velocity:     2.39 m/s RV Syst Pressure:     26 mmHg  (< 30mmHg) IVC Diam:             1.91 cm  PULMONIC VALVE:          Normal Ranges: PV Accel Time:  144 msec (>120ms) PV Max Pasha:     1.0 m/s  (0.6-0.9m/s) PV Max P.2 mmHg  [id number] Francois Chen DO Electronically signed on 2025 at 11:04:51 AM  Wall Scoring  ** Final **         Radiology:     No orders to display       Physical Exam     General Appearance: alert and oriented to person, place and time, in no acute distress, obese  Cardiovascular: normal rate, regular rhythm, normal S1 and S2, no murmurs,  rubs, clicks, or gallops,  no JVD  Pulmonary/Chest: clear to auscultation bilaterally- no wheezes, rales or rhonchi, normal air movement, no respiratory distress  Abdomen: soft, non-tender, non-distended, normal bowel sounds, no masses   Extremities: no cyanosis, clubbing .  2+ leg edema  Skin: warm and dry, no rash or erythema  Eyes: EOMI  Neck: supple and non-tender without mass, no thyromegaly   Neurological: alert, oriented, normal speech, no focal findings or movement disorder noted  Vascular: pulses 2+ no bruit      Scribe Attestation  By signing my name below, I, Laila Hawkins MA, Scribe   attest that this documentation has been prepared under the direction and in the presence of David Hutchins MD.

## 2025-02-03 NOTE — PATIENT INSTRUCTIONS
FOLLOW UP AFTER TESTING   FASTING LABS IN ONE WEEK  ASK INSURANCE COMPANY ABOUT JARDIANCE   AVOID ALL PROCESSED FOODS   INCREASE VALSARTAN 160 MG DAILY   DISCONTINUE IBUPROFEN      DID YOU KNOW  We have a pharmacy here in the [facility].  They can fill all prescriptions, not just cardiac medications.  Prescriptions from other pharmacies can easily be transferred to the  pharmacy by the  pharmacist on site.   pharmacies offer FREE HOME DELIVERY on medications to anywhere in [place]. They can sync your medications. Typically prescriptions can be ready in 10 - 15 minutes. If pharmacy is unable to fill your  prescription or if cost is more than your paying now the Pharmacist can easily transfer back to your Pharmacy of choice. Pharmacy phone # [phone].      Please bring all medicines, vitamins, and herbal supplements with you in original bottles to every appointment  Prescriptions will not be filled unless you are compliant with your follow up appointments or have a follow up appointment scheduled as per instruction of your physician.   Refills should be requested at the time of your visit.   
36.2

## 2025-02-10 ENCOUNTER — APPOINTMENT (OUTPATIENT)
Dept: CARDIOLOGY | Facility: CLINIC | Age: 60
End: 2025-02-10
Payer: COMMERCIAL

## 2025-02-10 ENCOUNTER — APPOINTMENT (OUTPATIENT)
Dept: OCCUPATIONAL THERAPY | Facility: CLINIC | Age: 60
End: 2025-02-10
Payer: COMMERCIAL

## 2025-02-10 DIAGNOSIS — R00.2 PALPITATIONS: ICD-10-CM

## 2025-02-10 LAB
ANION GAP SERPL CALCULATED.4IONS-SCNC: 12 MMOL/L (CALC) (ref 7–17)
BUN SERPL-MCNC: 20 MG/DL (ref 7–25)
BUN/CREAT SERPL: ABNORMAL (CALC) (ref 6–22)
CALCIUM SERPL-MCNC: 9.4 MG/DL (ref 8.6–10.4)
CHLORIDE SERPL-SCNC: 103 MMOL/L (ref 98–110)
CHOLEST SERPL-MCNC: 190 MG/DL
CHOLEST/HDLC SERPL: 5.9 (CALC)
CO2 SERPL-SCNC: 24 MMOL/L (ref 20–32)
CREAT SERPL-MCNC: 0.61 MG/DL (ref 0.5–1.03)
EGFRCR SERPLBLD CKD-EPI 2021: 104 ML/MIN/1.73M2
ERYTHROCYTE [DISTWIDTH] IN BLOOD BY AUTOMATED COUNT: 12.6 % (ref 11–15)
GLUCOSE SERPL-MCNC: 139 MG/DL (ref 65–99)
HCT VFR BLD AUTO: 42.2 % (ref 35–45)
HDLC SERPL-MCNC: 32 MG/DL
HGB BLD-MCNC: 14 G/DL (ref 11.7–15.5)
LDLC SERPL CALC-MCNC: 124 MG/DL (CALC)
MCH RBC QN AUTO: 29.5 PG (ref 27–33)
MCHC RBC AUTO-ENTMCNC: 33.2 G/DL (ref 32–36)
MCV RBC AUTO: 88.8 FL (ref 80–100)
NONHDLC SERPL-MCNC: 158 MG/DL (CALC)
PLATELET # BLD AUTO: 260 THOUSAND/UL (ref 140–400)
PMV BLD REES-ECKER: 10.7 FL (ref 7.5–12.5)
POTASSIUM SERPL-SCNC: 4.2 MMOL/L (ref 3.5–5.3)
RBC # BLD AUTO: 4.75 MILLION/UL (ref 3.8–5.1)
SODIUM SERPL-SCNC: 139 MMOL/L (ref 135–146)
TRIGL SERPL-MCNC: 218 MG/DL
WBC # BLD AUTO: 7.7 THOUSAND/UL (ref 3.8–10.8)

## 2025-02-14 PROCEDURE — 93227 XTRNL ECG REC<48 HR R&I: CPT | Performed by: INTERNAL MEDICINE

## 2025-02-25 ENCOUNTER — APPOINTMENT (OUTPATIENT)
Dept: PRIMARY CARE | Facility: CLINIC | Age: 60
End: 2025-02-25
Payer: COMMERCIAL

## 2025-02-25 VITALS
TEMPERATURE: 97.5 F | HEIGHT: 66 IN | BODY MASS INDEX: 46.12 KG/M2 | SYSTOLIC BLOOD PRESSURE: 138 MMHG | DIASTOLIC BLOOD PRESSURE: 80 MMHG | HEART RATE: 75 BPM | WEIGHT: 287 LBS | OXYGEN SATURATION: 97 %

## 2025-02-25 DIAGNOSIS — Z12.31 SCREENING MAMMOGRAM FOR BREAST CANCER: ICD-10-CM

## 2025-02-25 DIAGNOSIS — M25.50 ARTHRALGIA, UNSPECIFIED JOINT: Primary | ICD-10-CM

## 2025-02-25 DIAGNOSIS — E66.813 CLASS 3 SEVERE OBESITY DUE TO EXCESS CALORIES WITH SERIOUS COMORBIDITY AND BODY MASS INDEX (BMI) OF 45.0 TO 49.9 IN ADULT: ICD-10-CM

## 2025-02-25 DIAGNOSIS — E66.01 CLASS 3 SEVERE OBESITY DUE TO EXCESS CALORIES WITH SERIOUS COMORBIDITY AND BODY MASS INDEX (BMI) OF 45.0 TO 49.9 IN ADULT: ICD-10-CM

## 2025-02-25 PROCEDURE — 3075F SYST BP GE 130 - 139MM HG: CPT | Performed by: FAMILY MEDICINE

## 2025-02-25 PROCEDURE — 3079F DIAST BP 80-89 MM HG: CPT | Performed by: FAMILY MEDICINE

## 2025-02-25 PROCEDURE — 1036F TOBACCO NON-USER: CPT | Performed by: FAMILY MEDICINE

## 2025-02-25 PROCEDURE — 3008F BODY MASS INDEX DOCD: CPT | Performed by: FAMILY MEDICINE

## 2025-02-25 PROCEDURE — 99213 OFFICE O/P EST LOW 20 MIN: CPT | Performed by: FAMILY MEDICINE

## 2025-02-25 RX ORDER — MELOXICAM 7.5 MG/1
7.5 TABLET ORAL 2 TIMES DAILY
Qty: 60 TABLET | Refills: 2 | Status: SHIPPED | OUTPATIENT
Start: 2025-02-25 | End: 2025-03-27

## 2025-02-25 ASSESSMENT — PATIENT HEALTH QUESTIONNAIRE - PHQ9
SUM OF ALL RESPONSES TO PHQ9 QUESTIONS 1 AND 2: 0
2. FEELING DOWN, DEPRESSED OR HOPELESS: NOT AT ALL
1. LITTLE INTEREST OR PLEASURE IN DOING THINGS: NOT AT ALL

## 2025-02-25 NOTE — PROGRESS NOTES
Subjective   Patient ID: Lauren Coon is a 59 y.o. female who presents for Leg Swelling and Leg Pain.  HPI    Patient presents in office for inflammation of her joints. Patient states her cardio doctor took her off ibuprofen due to contributing to her swelling in her legs and would need something else to help her instead.     Patient would like to discuss her blood work recently done on 1/14/25.     Review of Systems  Constitutional:  no chills, no fever and no night sweats.  Eyes: no blurred vision and no eyesight problems.  ENT: no hearing loss, no nasal congestion, no hoarseness and no sore throat.  Neck: no mass (es) and no swelling.  Cardiovascular: no chest pain, no intermittent leg claudication, no lower extremity edema, no palpitation and no syncope.  Respiratory: no cough, no shortness of breath during exertion, no shortness of breath at rest and no wheezing.  Gastrointestinal: no abdominal pain, no blood in stools, no constipation, no diarrhea, no melena, no nausea, no rectal pain and no vomiting.  Genitourinary: no dysuria, no change in urinary frequency, no urinary hesitancy and no feelings of urinary urgency.  Musculoskeletal: no arthralgias, no back pain and no myalgias.  Integumentary: no new skin lesions and no rashes.  Neurological: no difficulty walking, no headache, no limb weakness, no numbness and no tingling.  Psychiatric/Behavioral: no anxiety, no depression, no anhedonia and no substance use disorders.  Endocrine: no recent weight gain and no recent weight loss.  Hematologic/Lymphatic: no tendency for easy bruising and no swollen glands    Objective   Physical Exam  Patient in for follow-up peripheral edema improved after stopping ibuprofen cardiac workup has been negative she is getting a calcium score done in March follows up with cardiology after that.  Blood pressures been stable.  No significant weight loss.  No new physical complaints.  There were no vitals taken for this  visit.    Lab Results   Component Value Date    WBC 7.7 02/10/2025    HGB 14.0 02/10/2025    HCT 42.2 02/10/2025    MCV 88.8 02/10/2025     02/10/2025       Assessment/Plan plan is to continue the Lasix will try 7.5 Mobic daily try to control her arthritis without causing the swelling she will let us know if there is any problems otherwise she will follow-up with cardiology at the end of March she is doing well we will follow-up with her in 6 months.  Problem List Items Addressed This Visit    None  Visit Diagnoses       Screening mammogram for breast cancer

## 2025-02-27 ENCOUNTER — APPOINTMENT (OUTPATIENT)
Dept: SURGERY | Facility: CLINIC | Age: 60
End: 2025-02-27
Payer: COMMERCIAL

## 2025-03-05 LAB — 25(OH)D3+25(OH)D2 SERPL-MCNC: 32 NG/ML (ref 30–100)

## 2025-03-21 ENCOUNTER — HOSPITAL ENCOUNTER (OUTPATIENT)
Dept: RADIOLOGY | Facility: CLINIC | Age: 60
Discharge: HOME | End: 2025-03-21
Payer: COMMERCIAL

## 2025-03-21 DIAGNOSIS — E78.2 MIXED HYPERLIPIDEMIA: ICD-10-CM

## 2025-03-21 PROCEDURE — 75571 CT HRT W/O DYE W/CA TEST: CPT

## 2025-03-24 ENCOUNTER — APPOINTMENT (OUTPATIENT)
Dept: CARDIOLOGY | Facility: CLINIC | Age: 60
End: 2025-03-24
Payer: COMMERCIAL

## 2025-03-24 VITALS
BODY MASS INDEX: 44.16 KG/M2 | HEIGHT: 66 IN | DIASTOLIC BLOOD PRESSURE: 74 MMHG | SYSTOLIC BLOOD PRESSURE: 114 MMHG | HEART RATE: 84 BPM | WEIGHT: 274.8 LBS

## 2025-03-24 DIAGNOSIS — I50.32: ICD-10-CM

## 2025-03-24 DIAGNOSIS — I10 HYPERTENSION, UNSPECIFIED TYPE: ICD-10-CM

## 2025-03-24 DIAGNOSIS — E88.810 METABOLIC SYNDROME: ICD-10-CM

## 2025-03-24 DIAGNOSIS — E78.2 MIXED HYPERLIPIDEMIA: ICD-10-CM

## 2025-03-24 PROBLEM — I50.31: Status: ACTIVE | Noted: 2025-03-24

## 2025-03-24 PROCEDURE — G2211 COMPLEX E/M VISIT ADD ON: HCPCS | Performed by: INTERNAL MEDICINE

## 2025-03-24 PROCEDURE — 3078F DIAST BP <80 MM HG: CPT | Performed by: INTERNAL MEDICINE

## 2025-03-24 PROCEDURE — 3008F BODY MASS INDEX DOCD: CPT | Performed by: INTERNAL MEDICINE

## 2025-03-24 PROCEDURE — 1036F TOBACCO NON-USER: CPT | Performed by: INTERNAL MEDICINE

## 2025-03-24 PROCEDURE — 3074F SYST BP LT 130 MM HG: CPT | Performed by: INTERNAL MEDICINE

## 2025-03-24 PROCEDURE — 99214 OFFICE O/P EST MOD 30 MIN: CPT | Performed by: INTERNAL MEDICINE

## 2025-03-24 RX ORDER — ROSUVASTATIN CALCIUM 20 MG/1
20 TABLET, COATED ORAL NIGHTLY
Qty: 90 TABLET | Refills: 3 | Status: SHIPPED | OUTPATIENT
Start: 2025-03-24 | End: 2026-03-24

## 2025-03-24 NOTE — PROGRESS NOTES
Patient:  Lauren Coon  YOB: 1965  MRN: [id number]       Impression/Plan:     Diagnoses and all orders for this visit:  CHF NYHA class I, chronic, diastolic (Multi)  -     Asymptomatic at this time, excellent response to increasing AR-2 blockade.  -     Talked to her about Jardiance previously but as she is completely asymptomatic with better control blood pressure I think it is not essential that that agent be added at this point additionally is rather expensive on her insurance plan.  Hypertension, unspecified type  -     Well-controlled  Mixed hyperlipidemia  -     She has metabolic syndrome and LDL is in excess of 120 would institute rosuvastatin 20 daily with repeat lipids in about 3 months  -     rosuvastatin (Crestor) 20 mg tablet; Take 1 tablet (20 mg) by mouth once daily at bedtime.  -     Lipid Panel; Future  Metabolic syndrome  -     She has follow-up with Dr. Day.  With regular exercise weight loss and potentially agent such as metformin or Jardiance if necessary, her risk of developing full-blown diabetes can be further reduced      Chief Complaint/Active Symptoms:      Chief Complaint   Patient presents with    Results     Here to review results from recent testing.        Lauren Coon is a 59 y.o. female who presents with hypertension, diastolic CHF, hyperlipidemia and metabolic syndrome with morbid obesity.      I had seen her as new consult 2/3/2025 for edema shortness of breath felt likely stage II diastolic.  Nonsteroidal anti-inflammatories were felt to be contributing to her volume overload.  Is recommended she discontinue those.  Additionally blood pressure was poorly controlled and valsartan increased to 160.  At that time she was going to look into whether Jardiance would be covered.  Also with palpitations such that Holter monitor was obtained.  Also discussed with her at that time that she met criteria for metabolic syndrome.  Diet was discussed.  She is  encouraged to follow-up with PCP for further monitoring.  CT scan also ordered to assess risk    3/23/2025 coronary calcium score: 0    2/10/2025 48-hour Holter monitor  48-hour Holter monitoring was performed with adequate tracings obtained.  Average heart rate was 84 bpm.  Minimum maximal heart rates were 70 beats minute and 112 beats minute respectively.  Predominant rhythm was sinus rhythm with normal heart rate variability.  There are rare isolated APCs.  There is no supraventricular or ventricular dysrhythmias.  No atrial fibrillation.  Occasional isolated PVCs.  No couplets or ventricular bigeminy or tachycardia.  No high-grade AV blocks or pauses.  Symptoms of palpitations were at some times of sinus rhythm.   IMPRESSION:   Negative 48-hour Holter monitor for significant dysrhythmias.       Lab work February 2025        Cholesterol 190 HDL 32 triglycerides 218   CBC normal  BMP remarkable for normal renal function but glucose 139    She denies angina, dyspnea, palpitation, edema, lightheadedness or syncope.  She has had no symptoms of claudication or neurologic deterioration.  There have been no hospitalizations or emergency room visits since last office visit.    She says rarely she notes a skipped beat not particularly troublesome to her at all.  She noted a marked improvement in her breathing with better control of blood pressure and increase in AR-2 blocker as described above.  She is trying to watch her diet better and indeed has lost weight since last seen nearly 13 pounds.    Review of Systems: Unremarkable except as noted above    Meds     Current Outpatient Medications   Medication Instructions    acetaminophen (Tylenol) 500 mg tablet 2 tablets, 2 times daily    carvedilol (COREG) 12.5 mg, oral, 2 times daily    colestipol (COLESTID) 1 g, oral, 3 times daily, Take at least 1 hour after or 4 hours before other medications.    cyclobenzaprine (FLEXERIL) 10 mg, oral, 3 times daily PRN     "furosemide (Lasix) 20 mg tablet Take 2 tablets PO (40 mg) Tues/Thurs/Sat. Take 1 tablet PO (20 mg) Mon/Wed/Fri/Sun.    gabapentin (NEURONTIN) 600 mg, oral, Nightly    meloxicam (MOBIC) 7.5 mg, oral, 2 times daily    triamcinolone (Kenalog) 0.5 % cream Topical, 3 times daily    valsartan (DIOVAN) 160 mg, oral, Daily        Allergies     Allergies   Allergen Reactions    Morphine Itching     Itching really bad    Kiwi (Actinidia Chinensis) Unknown     Lip swelling    Lactose Diarrhea and Unknown     cramps    Methylprednisolone Unknown     MDP - shoots her BP up and gives her a rash    Rofecoxib Hives and Unknown    Valdecoxib Unknown     pt states she can take motrin, aleve, and toradol without any problems/reactions         Annotated Problems     Specialty Problems          Cardiology Problems    Hypertension    Mixed hyperlipidemia    Palpitations        Problem List     Patient Active Problem List    Diagnosis Date Noted    Shortness of breath 02/03/2025    Bilateral edema of lower extremity 01/30/2025    Palpitations 01/30/2025    Mixed hyperlipidemia 01/30/2025    Vitamin D deficiency 01/24/2025    Localized swelling of both lower legs 01/09/2025    Itching 01/09/2025    Elevated hemoglobin A1c 03/12/2024    Arthritis 05/22/2023    Fatigue 05/22/2023    Nerve pain 05/22/2023    History of lumbar spinal fusion 05/22/2023    Hypertension 05/22/2023    Lumbar radiculopathy 05/22/2023    Status post total knee replacement, right 09/28/2018    Osteoarthritis of spine with radiculopathy, lumbar region 06/27/2017    Degenerative spondylolisthesis 06/06/2017       Objective:     Vitals:    03/24/25 0915   BP: 114/74   BP Location: Right arm   Patient Position: Sitting   Pulse: 84   Weight: 125 kg (274 lb 12.8 oz)   Height: 1.676 m (5' 6\")      Wt Readings from Last 4 Encounters:   03/24/25 125 kg (274 lb 12.8 oz)   02/25/25 130 kg (287 lb)   02/03/25 130 kg (287 lb 9.6 oz)   01/21/25 113 kg (250 lb)           LAB: "     Lab Results   Component Value Date    WBC 7.7 02/10/2025    HGB 14.0 02/10/2025    HCT 42.2 02/10/2025     02/10/2025    CHOL 190 02/10/2025    TRIG 218 (H) 02/10/2025    HDL 32 (L) 02/10/2025    ALT 23 12/03/2024    AST 17 12/03/2024     02/10/2025    K 4.2 02/10/2025     02/10/2025    CREATININE 0.61 02/10/2025    BUN 20 02/10/2025    CO2 24 02/10/2025    TSH 1.13 02/25/2020    INR 1.0 09/07/2021    HGBA1C 5.9 (H) 12/03/2024         Physical Exam     General Appearance: alert and oriented to person, place and time, in no acute distress  Cardiovascular: normal rate, regular rhythm, normal S1 and S2, no murmurs, rubs, clicks, or gallops,  no JVD  Pulmonary/Chest: clear to auscultation bilaterally- no wheezes, rales or rhonchi, normal air movement, no respiratory distress  Abdomen: soft, non-tender, non-distended, normal bowel sounds, no masses   Extremities: no cyanosis, clubbing or edema  Skin: warm and dry, no rash or erythema  Eyes: EOMI  Neck: supple and non-tender without mass, no thyromegaly   Neurological: alert, oriented, normal speech, no focal findings or movement disorder noted  Vascular: Pulses 2+      Provider attestation-scribe documentation  Any medical record entries made by the scribe were at my discretion and personally dictated by me.  I have reviewed the chart and agree that the record accurately reflects my personal performance of the history, physical exam, discussion and plan.      Scribe Attestation  By signing my name below, I, Laila Hawkins MA, Scribe   attest that this documentation has been prepared under the direction and in the presence of David Hutchins MD.

## 2025-03-24 NOTE — PATIENT INSTRUCTIONS
AVOID PROCESSED FOODS, THEY USUALLY ARE HIGH IN SODIUM.  SODIUM IS BAD FOR BLOOD PRESSURE.  EAT FRESH OR FROZEN VEGETABLES AND FRESH MEATS.  AVOID SUASUAGES, HOT DOGS AND LUNCH MEATS.    DECREASE CARBOHYDRATES, RICE, PASTA AND POTATOES.  THIS WILL HELP WITH WEIGHT LOSS AND BLOOD SUGAR.     START CRESTOR 20 MG AT BEDTIME   STAY PLENTY HYDRATED   FASTING LABS IN 3 MONTHS      DID YOU KNOW  We have a pharmacy here in the [facility].  They can fill all prescriptions, not just cardiac medications.  Prescriptions from other pharmacies can easily be transferred to the  pharmacy by the  pharmacist on site.   pharmacies offer FREE HOME DELIVERY on medications to anywhere in [place]. They can sync your medications. Typically prescriptions can be ready in 10 - 15 minutes. If pharmacy is unable to fill your  prescription or if cost is more than your paying now the Pharmacist can easily transfer back to your Pharmacy of choice. Pharmacy phone # [phone].      Please bring all medicines, vitamins, and herbal supplements with you in original bottles to every appointment  Prescriptions will not be filled unless you are compliant with your follow up appointments or have a follow up appointment scheduled as per instruction of your physician.   Refills should be requested at the time of your visit.

## 2025-03-25 ENCOUNTER — APPOINTMENT (OUTPATIENT)
Dept: PRIMARY CARE | Facility: CLINIC | Age: 60
End: 2025-03-25
Payer: COMMERCIAL

## 2025-04-07 ENCOUNTER — TELEPHONE (OUTPATIENT)
Dept: PRIMARY CARE | Facility: CLINIC | Age: 60
End: 2025-04-07
Payer: COMMERCIAL

## 2025-04-07 DIAGNOSIS — J06.9 UPPER RESPIRATORY TRACT INFECTION, UNSPECIFIED TYPE: Primary | ICD-10-CM

## 2025-04-07 RX ORDER — AZITHROMYCIN 250 MG/1
TABLET, FILM COATED ORAL
Qty: 6 TABLET | Refills: 0 | Status: SHIPPED | OUTPATIENT
Start: 2025-04-07 | End: 2025-04-12

## 2025-04-07 NOTE — TELEPHONE ENCOUNTER
Patient c/o sore throat which started this past Thursday 4/3/25, she got better but now experiencing watery eyes, stuffy nose, ears burning, throat burning, chest congestion, coughing and voice hoarseness. All of this started on Saturday 4/5/25. She has tried OTC Claritin and it's not working. The OTC nasal spray helps open up her nose. Wanted to know if you could call in something for her or does she need to be seen?  Please advise  Thanks    (Lauren, up front)    DOL visit with Dr Day 2/25/25    Preferred pharmacy [facility] in [place] ([place]) on [address]

## 2025-04-15 ENCOUNTER — APPOINTMENT (OUTPATIENT)
Dept: ALLERGY | Facility: CLINIC | Age: 60
End: 2025-04-15
Payer: COMMERCIAL

## 2025-04-15 VITALS
HEIGHT: 66 IN | TEMPERATURE: 97.6 F | HEART RATE: 94 BPM | WEIGHT: 264 LBS | BODY MASS INDEX: 42.43 KG/M2 | SYSTOLIC BLOOD PRESSURE: 136 MMHG | DIASTOLIC BLOOD PRESSURE: 84 MMHG | OXYGEN SATURATION: 100 %

## 2025-04-15 DIAGNOSIS — J30.2 SEASONAL ALLERGIC RHINITIS, UNSPECIFIED TRIGGER: Primary | ICD-10-CM

## 2025-04-15 DIAGNOSIS — J32.9 RECURRENT SINUSITIS: ICD-10-CM

## 2025-04-15 PROCEDURE — 99244 OFF/OP CNSLTJ NEW/EST MOD 40: CPT | Performed by: ALLERGY & IMMUNOLOGY

## 2025-04-15 PROCEDURE — 95004 PERQ TESTS W/ALRGNC XTRCS: CPT | Performed by: ALLERGY & IMMUNOLOGY

## 2025-04-15 NOTE — PROGRESS NOTES
Patient ID: Lauren Coon is a 59 y.o. female.     Chief Complaint: Npv referred by Dr. Day  History Of Present Illness  Lauren Coon is a 59 y.o. female with PMx rhinitis presenting for consultation.     Food Allergy  Cantaloupe and Kiwi lead to thickness of the tongue and swollen lips    Eczema/ Atopic Dermatitis  No    Asthma  No    Rhinoconjunctivitis  History of pollen allergy  Reports need for quarterly Kenalog injections to control symptoms, but has early cataracts and is warned by her eye doctor that this may be detrimental to her vision    Drug Allergy   Several reviewed    Insect Allergy   No    Infections  No history of frequent or recurrent infections      Review of Systems    Pertinent positives and negatives have been assessed in the HPI. All other systems have been reviewed and are negative except as noted in the HPI.    Allergies  Morphine, Kiwi (actinidia chinensis), Lactose, Methylprednisolone, Rofecoxib, and Valdecoxib    Past Medical History  She has a past medical history of Acute pansinusitis, unspecified (08/17/2021), Acute upper respiratory infection, unspecified (09/06/2022), Contact with and (suspected) exposure to covid-19 (03/08/2022), Encounter for immunization (12/18/2020), Encounter for other screening for malignant neoplasm of breast (04/26/2022), Encounter for screening for malignant neoplasm of colon (04/20/2021), Encounter for screening for osteoporosis, Laceration without foreign body, right lower leg, subsequent encounter (05/04/2021), Nausea, Other specified symptoms and signs involving the circulatory and respiratory systems (06/02/2021), Personal history of diseases of the skin and subcutaneous tissue (04/20/2021), Personal history of Methicillin resistant Staphylococcus aureus infection (09/27/2021), Personal history of other diseases of the respiratory system (04/28/2022), Personal history of other diseases of the respiratory system (09/22/2020), Personal  history of other infectious and parasitic diseases (10/09/2020), Personal history of other specified conditions (07/19/2022), Personal history of other specified conditions (03/17/2022), and Unspecified open wound, right lower leg, initial encounter (05/04/2021).    Family History  Family History   Problem Relation Name Age of Onset    Hypertension Mother      Hyperthyroidism Mother      Arthritis Father      Diabetes Father      Prostate cancer Father         No history of food allergy or atopic disease in the family.    Surgical History  She has a past surgical history that includes Other surgical history (11/04/2019); Other surgical history (11/04/2019); Other surgical history (11/04/2019); Other surgical history (11/04/2019); Other surgical history (11/04/2019); Other surgical history (11/04/2019); Other surgical history (11/04/2019); and Other surgical history (11/04/2019).    Social/Environmental History  She reports that she has never smoked. She has never used smokeless tobacco. She reports that she does not drink alcohol and does not use drugs.    Home: Lives in a trailer   Floors: Tiled  Air Conditioning: Central  Smoker: No  Pets: No   Infestations: No  Molds: No  Occupation:     MEDICATIONS  Current Outpatient Medications on File Prior to Visit   Medication Sig Dispense Refill    acetaminophen (Tylenol) 500 mg tablet Take 2 tablets (1,000 mg) by mouth 2 times a day.      carvedilol (Coreg) 12.5 mg tablet Take 1 tablet (12.5 mg) by mouth 2 times a day. 180 tablet 1    colestipol (Colestid) 1 gram tablet Take 1 tablet (1 g) by mouth 3 times a day. Take at least 1 hour after or 4 hours before other medications. 90 tablet 3    cyclobenzaprine (Flexeril) 10 mg tablet Take 1 tablet (10 mg) by mouth 3 times a day as needed for muscle spasms. 90 tablet 3    furosemide (Lasix) 20 mg tablet Take 2 tablets PO (40 mg) Tues/Thurs/Sat. Take 1 tablet PO (20 mg) Mon/Wed/Fri/Sun. 120 tablet 0     "gabapentin (Neurontin) 600 mg tablet Take 1 tablet (600 mg) by mouth once daily at bedtime. 30 tablet 5    rosuvastatin (Crestor) 20 mg tablet Take 1 tablet (20 mg) by mouth once daily at bedtime. 90 tablet 3    valsartan (Diovan) 160 mg tablet Take 1 tablet (160 mg) by mouth once daily. 90 tablet 3    [] azithromycin (Zithromax) 250 mg tablet Take 2 tablets (500 mg) by mouth once daily for 1 day, THEN 1 tablet (250 mg) once daily for 4 days. 6 tablet 0     No current facility-administered medications on file prior to visit.     Physical Exam  Visit Vitals  /84   Pulse 94   Temp 36.4 °C (97.6 °F)   Ht 1.676 m (5' 6\")   Wt 120 kg (264 lb)   SpO2 100%   BMI 42.61 kg/m²   Smoking Status Never   BSA 2.36 m²       Wt Readings from Last 1 Encounters:   04/15/25 120 kg (264 lb)       Physical Exam    General: Well appearing, no acute distress  Head: Normocephalic, atraumatic, neck supple without lymphadenopathy  Eyes: EOMI, non-injected  Nose: No nasal crease, nares patent, slightly boggy turbinates, minimal discharge  Throat: Normal dentition, no erythema  Heart: Regular rate and rhythm  Lungs: Clear to auscultation bilaterally, effort normal  Abdomen: Soft, non-tender, normal bowel sounds  Extremities: Moves all extremities symmetrically, no edema  Skin: No rashes/lesions  Psych: normal mood and affect    LAB RESULTS:  CBC:  Recent Labs     02/10/25  0918 24  0717 22  1318 21  1354 20  1119   WBC 7.7 10.4 13.1*   < > 7.3   HGB 14.0 14.1 14.1   < > 14.2   HCT 42.2 44.8 44.4   < > 45.1    237 266   < > 231   MCV 88.8 92 90   < > 90   EOSABS  --  0.31 0.10  --  0.16    < > = values in this interval not displayed.       CMP:  Recent Labs     02/10/25  0918 24  1429 24  0717    142 142   K 4.2 3.8 4.6    103 103   CO2 24 32 30   ANIONGAP 12 11 14   BUN 20 17 18   CREATININE 0.61 0.75 0.61   EGFR 104 >90 >90     Recent Labs     24  1429 24  0717 " 09/07/21  1354 02/25/20  1442 12/24/19  0925   ALBUMIN 3.9 3.8 3.7   < > 4.0   ALKPHOS 82 84 71   < > 88   ALT 23 29 20   < > 20   AST 17 22 17   < > 16   BILITOT 0.4 0.4 0.3   < > 0.6   LIPASE  --   --   --   --  6*    < > = values in this interval not displayed.       Recent Labs     02/01/24  0717 03/12/22  1318 06/22/20  1119   EOSABS 0.31 0.10 0.16     COAG:   Recent Labs     09/07/21  1354   INR 1.0       HEME/ENDO:  Recent Labs     01/14/25  1418 12/03/24  1429 02/01/24  0717 02/25/20  1442   TSH  --   --   --  1.13   HGBA1C  --  5.9* 6.2*  --    FERRITIN 210*  --   --   --    IRONSAT 14*  --   --   --      Allergy skin tests 4/15/64-[id number] and scanned.    Assessment/Plan   60 yo with a history of allergic rhinitis and chronic sinusitis.  Allergy skin tests are not definitively positive.  I will obtain immune labs and allergy labs for allergens of concern and call results to patient. Plan follow up in 2-3 months.    Patricia Leroy DO

## 2025-04-15 NOTE — PATIENT INSTRUCTIONS
Allergy test are negative to cat, dog, dust mite, feather, cockroach, molds and pollens.     Obtain additional labs and I will call results.

## 2025-04-15 NOTE — ADDENDUM NOTE
Addended by: JOHNATHON HEWITT on: 4/15/2025 02:48 PM     Modules accepted: Orders    
Detail Level: Simple

## 2025-04-16 LAB
C HERBARUM IGE QN: <0.1 KU/L
C HERBARUM IGE RAST: 0
REF LAB TEST REF RANGE: NORMAL

## 2025-04-18 LAB
CH50 SERPL-ACNC: >60 U/ML (ref 31–60)
GIANT RAGWEED IGE AB [PRESENCE] IN SERUM BY RADIOALLERGOSORBENT TEST (RAST): 1
GIANT RAGWEED IGE QN: 0.44 KU/L
HONEYDEW MELON IGE AB [PRESENCE] IN SERUM BY RADIOALLERGOSORBENT TEST (RAST): 0
IGA SERPL-MCNC: 474 MG/DL (ref 47–310)
IGG SERPL-MCNC: 1098 MG/DL (ref 600–1640)
IGM SERPL-MCNC: 59 MG/DL (ref 50–300)
KIWIFRUIT IGE QN: <0.1 KU/L
KIWIFRUIT IGE RAST: 0
MELON IGE QN: <0.1 KU/L
MUGWORT IGE QN: <0.1 KU/L
MUGWORT IGE RAST: 0
RED OAK IGE QN: NORMAL
RED OAK IGE RAST: NORMAL
REF LAB TEST REF RANGE: NORMAL
S PN DA SERO 19F IGG SER-MCNC: 2.1 UG/ML
S PNEUM DA 1 IGG SER-MCNC: <0.3 UG/ML
S PNEUM DA 10A IGG SER-MCNC: <0.3 UG/ML
S PNEUM DA 11A IGG SER-MCNC: <0.3 UG/ML
S PNEUM DA 12F IGG SER-MCNC: <0.3 UG/ML
S PNEUM DA 14 IGG SER-MCNC: 0.4
S PNEUM DA 15B IGG SER-MCNC: 1.8 UG/ML
S PNEUM DA 17F IGG SER-MCNC: 0.5 UG/ML
S PNEUM DA 18C IGG SER-MCNC: 1.2
S PNEUM DA 19A IGG SER-MCNC: 0.5 UG/ML
S PNEUM DA 2 IGG SER-MCNC: 0.7 UG/ML
S PNEUM DA 20A IGG SER-MCNC: 1.6 UG/ML
S PNEUM DA 22F IGG SER-MCNC: 0.9 UG/ML
S PNEUM DA 23F IGG SER-MCNC: <0.3 UG/ML
S PNEUM DA 3 IGG SER-MCNC: <0.3 UG/ML
S PNEUM DA 33F IGG SER-MCNC: 0.9 UG/ML
S PNEUM DA 4 IGG SER-MCNC: <0.3 UG/ML
S PNEUM DA 5 IGG SER-MCNC: 1 UG/ML
S PNEUM DA 6B IGG SER-MCNC: <0.3 UG/ML
S PNEUM DA 7F IGG SER-MCNC: <0.3 UG/ML
S PNEUM DA 8 IGG SER-MCNC: 0.3 UG/ML
S PNEUM DA 9N IGG SER-MCNC: <0.3 UG/ML
S PNEUM DA 9V IGG SER-MCNC: <0.3 UG/ML
SILVER BIRCH IGE QN: <0.1 KU/L
SILVER BIRCH IGE RAST: 0
SW VERNAL GRASS IGE QN: 0.78 KU/L
SW VERNAL GRASS IGE RAST: 2

## 2025-04-21 DIAGNOSIS — R19.7 DIARRHEA, UNSPECIFIED TYPE: ICD-10-CM

## 2025-04-21 RX ORDER — COLESTIPOL HYDROCHLORIDE 1 G/1
1 TABLET ORAL 3 TIMES DAILY
Qty: 90 TABLET | Refills: 3 | Status: SHIPPED | OUTPATIENT
Start: 2025-04-21

## 2025-04-21 RX ORDER — COLESTIPOL HYDROCHLORIDE 1 G/1
1 TABLET ORAL 3 TIMES DAILY
Qty: 90 TABLET | Refills: 3 | Status: SHIPPED | OUTPATIENT
Start: 2025-04-21 | End: 2025-04-21 | Stop reason: SDUPTHER

## 2025-04-25 LAB
CH50 SERPL-ACNC: >60 U/ML (ref 31–60)
GIANT RAGWEED IGE AB [PRESENCE] IN SERUM BY RADIOALLERGOSORBENT TEST (RAST): 1
GIANT RAGWEED IGE QN: 0.44 KU/L
HONEYDEW MELON IGE AB [PRESENCE] IN SERUM BY RADIOALLERGOSORBENT TEST (RAST): 0
IGA SERPL-MCNC: 474 MG/DL (ref 47–310)
IGG SERPL-MCNC: 1098 MG/DL (ref 600–1640)
IGM SERPL-MCNC: 59 MG/DL (ref 50–300)
KIWIFRUIT IGE QN: <0.1 KU/L
KIWIFRUIT IGE RAST: 0
MELON IGE QN: <0.1 KU/L
MUGWORT IGE QN: <0.1 KU/L
MUGWORT IGE RAST: 0
RED OAK IGE QN: <0.35 KU/L
RED OAK IGE RAST: 0
REF LAB TEST REF RANGE: NORMAL
S PN DA SERO 19F IGG SER-MCNC: 2.1 UG/ML
S PNEUM DA 1 IGG SER-MCNC: <0.3 UG/ML
S PNEUM DA 10A IGG SER-MCNC: <0.3 UG/ML
S PNEUM DA 11A IGG SER-MCNC: <0.3 UG/ML
S PNEUM DA 12F IGG SER-MCNC: <0.3 UG/ML
S PNEUM DA 14 IGG SER-MCNC: 0.4
S PNEUM DA 15B IGG SER-MCNC: 1.8 UG/ML
S PNEUM DA 17F IGG SER-MCNC: 0.5 UG/ML
S PNEUM DA 18C IGG SER-MCNC: 1.2
S PNEUM DA 19A IGG SER-MCNC: 0.5 UG/ML
S PNEUM DA 2 IGG SER-MCNC: 0.7 UG/ML
S PNEUM DA 20A IGG SER-MCNC: 1.6 UG/ML
S PNEUM DA 22F IGG SER-MCNC: 0.9 UG/ML
S PNEUM DA 23F IGG SER-MCNC: <0.3 UG/ML
S PNEUM DA 3 IGG SER-MCNC: <0.3 UG/ML
S PNEUM DA 33F IGG SER-MCNC: 0.9 UG/ML
S PNEUM DA 4 IGG SER-MCNC: <0.3 UG/ML
S PNEUM DA 5 IGG SER-MCNC: 1 UG/ML
S PNEUM DA 6B IGG SER-MCNC: <0.3 UG/ML
S PNEUM DA 7F IGG SER-MCNC: <0.3 UG/ML
S PNEUM DA 8 IGG SER-MCNC: 0.3 UG/ML
S PNEUM DA 9N IGG SER-MCNC: <0.3 UG/ML
S PNEUM DA 9V IGG SER-MCNC: <0.3 UG/ML
SILVER BIRCH IGE QN: <0.1 KU/L
SILVER BIRCH IGE RAST: 0
SW VERNAL GRASS IGE QN: 0.78 KU/L
SW VERNAL GRASS IGE RAST: 2

## 2025-05-09 DIAGNOSIS — R60.0 BILATERAL EDEMA OF LOWER EXTREMITY: ICD-10-CM

## 2025-05-09 RX ORDER — FUROSEMIDE 20 MG/1
TABLET ORAL
Qty: 120 TABLET | Refills: 0 | Status: SHIPPED | OUTPATIENT
Start: 2025-05-09

## 2025-05-12 PROBLEM — R09.81 NASAL CONGESTION: Status: ACTIVE | Noted: 2025-05-12

## 2025-05-12 PROBLEM — L03.90 CELLULITIS: Status: ACTIVE | Noted: 2025-05-12

## 2025-05-12 PROBLEM — M79.673 PAIN OF FOOT: Status: ACTIVE | Noted: 2025-05-12

## 2025-05-12 PROBLEM — J32.9 SINUSITIS: Status: ACTIVE | Noted: 2025-05-12

## 2025-05-12 PROBLEM — M25.569 ARTHRALGIA OF KNEE: Status: ACTIVE | Noted: 2025-05-12

## 2025-05-12 PROBLEM — K63.5 POLYP OF COLON: Status: ACTIVE | Noted: 2025-05-12

## 2025-05-12 PROBLEM — R19.7 DIARRHEA: Status: ACTIVE | Noted: 2025-05-12

## 2025-05-12 PROBLEM — R10.9 RIGHT FLANK PAIN: Status: ACTIVE | Noted: 2025-05-12

## 2025-05-12 PROBLEM — S89.90XA INJURY OF LOWER EXTREMITY: Status: ACTIVE | Noted: 2025-05-12

## 2025-05-12 PROBLEM — M54.9 BACK PAIN: Status: ACTIVE | Noted: 2018-05-20

## 2025-05-12 PROBLEM — B37.31 CANDIDIASIS OF VAGINA: Status: ACTIVE | Noted: 2025-05-12

## 2025-05-12 PROBLEM — R20.2 PARESTHESIA OF LOWER EXTREMITY: Status: ACTIVE | Noted: 2025-05-12

## 2025-05-12 PROBLEM — R35.0 INCREASED FREQUENCY OF URINATION: Status: ACTIVE | Noted: 2025-05-12

## 2025-05-12 PROBLEM — Z96.659 HISTORY OF TOTAL KNEE REPLACEMENT: Status: ACTIVE | Noted: 2018-09-28

## 2025-05-12 PROBLEM — H10.30 ACUTE CONJUNCTIVITIS: Status: ACTIVE | Noted: 2025-05-12

## 2025-05-12 PROBLEM — N20.0 CALCULUS OF KIDNEY: Status: ACTIVE | Noted: 2025-05-12

## 2025-05-12 PROBLEM — R25.2 SPASM: Status: ACTIVE | Noted: 2025-05-12

## 2025-05-12 RX ORDER — MELOXICAM 7.5 MG/1
1 TABLET ORAL
COMMUNITY
Start: 2025-04-16 | End: 2025-05-13 | Stop reason: WASHOUT

## 2025-05-12 NOTE — PROGRESS NOTES
Subjective   Patient ID: Lauren Coon is a 59 y.o. female who presents for Annual Exam and Medication Problem.  HPI    Patient presents today for a physical. Does not need form filled out. Is not fasting for blood work. Tries to eat a generally healthy diet.    Patient states she has stopped taking her meloxicam due to edema in both legs, patient stats this is a recurrent problem. She would like to discuss other inflammation medication.     Patient would like to talk about weight loss options.     Review of Systems  Constitutional:  no chills, no fever and no night sweats.  Eyes: no blurred vision and no eyesight problems.  ENT: no hearing loss, no nasal congestion, no hoarseness and no sore throat.  Neck: no mass (es) and no swelling.  Cardiovascular: no chest pain, no intermittent leg claudication, no lower extremity edema, no palpitation and no syncope.  Respiratory: no cough, no shortness of breath during exertion, no shortness of breath at rest and no wheezing.  Gastrointestinal: no abdominal pain, no blood in stools, no constipation, no diarrhea, no melena, no nausea, no rectal pain and no vomiting.  Genitourinary: no dysuria, no change in urinary frequency, no urinary hesitancy and no feelings of urinary urgency.  Musculoskeletal: no arthralgias, no back pain and no myalgias.  Integumentary: no new skin lesions and no rashes.  Neurological: no difficulty walking, no headache, no limb weakness, no numbness and no tingling.  Psychiatric/Behavioral: no anxiety, no depression, no anhedonia and no substance use disorders.  Endocrine: no recent weight gain and no recent weight loss.  Hematologic/Lymphatic: no tendency for easy bruising and no swollen glands    Objective   Physical Exam  Patient in for follow-up exam also wants to talk about weight loss and arthritis.  Locks can help with the inflammation because significant lower extremity edema this resolved after she stopped meloxicam.  She has been on  "multiple nonsteroidals without success.  Well-developed well-nourished woman in no acute distress denies chest pain or shortness of breath with exertion chronic low back and joint pains.  Up-to-date on blood work.  Long discussion concerning options for weight loss insurance does not cover Zepbound or Wegovy she would like to try phentermine.  Joint aches and pains on multiple nonsteroidals some of them helped but caused some side effects.  Darted phentermine 37.5 mg every day for weight loss and follow-up in a month to see how she is doing.  /80 (BP Location: Left arm, Patient Position: Sitting, BP Cuff Size: Adult long)   Pulse 86   Temp 36.6 °C (97.8 °F) (Temporal)   Ht 1.676 m (5' 6\")   SpO2 94%   BMI 42.61 kg/m²     Lab Results   Component Value Date    WBC 7.7 02/10/2025    HGB 14.0 02/10/2025    HCT 42.2 02/10/2025    MCV 88.8 02/10/2025     02/10/2025       Assessment/Plan plan is to put her on Plaquenil 200 once a day for a week if that is not improved going up to twice daily.  Problem List Items Addressed This Visit    None    "

## 2025-05-13 ENCOUNTER — OFFICE VISIT (OUTPATIENT)
Dept: PRIMARY CARE | Facility: CLINIC | Age: 60
End: 2025-05-13
Payer: COMMERCIAL

## 2025-05-13 VITALS
BODY MASS INDEX: 42.61 KG/M2 | TEMPERATURE: 97.8 F | SYSTOLIC BLOOD PRESSURE: 122 MMHG | HEIGHT: 66 IN | OXYGEN SATURATION: 94 % | DIASTOLIC BLOOD PRESSURE: 80 MMHG | HEART RATE: 86 BPM

## 2025-05-13 DIAGNOSIS — M47.20 OSTEOARTHRITIS OF SPINE WITH RADICULOPATHY, UNSPECIFIED SPINAL REGION: Primary | ICD-10-CM

## 2025-05-13 DIAGNOSIS — R63.5 WEIGHT GAIN: ICD-10-CM

## 2025-05-13 PROCEDURE — 3079F DIAST BP 80-89 MM HG: CPT | Performed by: FAMILY MEDICINE

## 2025-05-13 PROCEDURE — 3074F SYST BP LT 130 MM HG: CPT | Performed by: FAMILY MEDICINE

## 2025-05-13 PROCEDURE — 99213 OFFICE O/P EST LOW 20 MIN: CPT | Performed by: FAMILY MEDICINE

## 2025-05-13 PROCEDURE — 1036F TOBACCO NON-USER: CPT | Performed by: FAMILY MEDICINE

## 2025-05-13 RX ORDER — HYDROXYCHLOROQUINE SULFATE 200 MG/1
200 TABLET, FILM COATED ORAL 2 TIMES DAILY
Qty: 60 TABLET | Refills: 3 | Status: SHIPPED | OUTPATIENT
Start: 2025-05-13 | End: 2025-07-12

## 2025-05-13 RX ORDER — PHENTERMINE HYDROCHLORIDE 37.5 MG/1
37.5 CAPSULE ORAL
Qty: 30 CAPSULE | Refills: 0 | Status: SHIPPED | OUTPATIENT
Start: 2025-05-13 | End: 2025-06-12

## 2025-05-13 ASSESSMENT — PATIENT HEALTH QUESTIONNAIRE - PHQ9
1. LITTLE INTEREST OR PLEASURE IN DOING THINGS: NOT AT ALL
2. FEELING DOWN, DEPRESSED OR HOPELESS: NOT AT ALL
SUM OF ALL RESPONSES TO PHQ9 QUESTIONS 1 AND 2: 0

## 2025-05-13 ASSESSMENT — PROMIS GLOBAL HEALTH SCALE
RATE_AVERAGE_PAIN: 6
RATE_SOCIAL_SATISFACTION: GOOD
RATE_AVERAGE_FATIGUE: MILD
RATE_GENERAL_HEALTH: FAIR
CARRYOUT_SOCIAL_ACTIVITIES: GOOD
RATE_QUALITY_OF_LIFE: FAIR
RATE_PHYSICAL_HEALTH: FAIR
RATE_MENTAL_HEALTH: GOOD
EMOTIONAL_PROBLEMS: SOMETIMES
CARRYOUT_PHYSICAL_ACTIVITIES: MOSTLY

## 2025-05-15 ENCOUNTER — TELEPHONE (OUTPATIENT)
Dept: PRIMARY CARE | Facility: CLINIC | Age: 60
End: 2025-05-15
Payer: COMMERCIAL

## 2025-05-15 DIAGNOSIS — R53.83 OTHER FATIGUE: ICD-10-CM

## 2025-05-15 NOTE — TELEPHONE ENCOUNTER
Please advise what blood work patient would need. Labs done 2/10/25      Lauren CARRERA Do [id number] Clinical Support Staff (supporting Maged Day MD) (Selected Message)        5/15/25  7:55 AM  Dr. Day started me on Plaquenil and we discussed having to have lab work done at a certain point.  I think he might have forgetten to order it because there is nothing in my chart. (we talked about a lot of things) Just wondering what I need to have drawn and when.  Please advise.     You can just send me back a my chart message please and thank you!     :-)

## 2025-05-15 NOTE — TELEPHONE ENCOUNTER
Labs ordered.       aMged Day MD to Do [id number] Clinical Support Staff (Selected Message)        5/15/25 12:23 PM  He needs a BMP and a CBC with differential in 1 month

## 2025-06-10 ENCOUNTER — APPOINTMENT (OUTPATIENT)
Dept: PRIMARY CARE | Facility: CLINIC | Age: 60
End: 2025-06-10
Payer: COMMERCIAL

## 2025-06-10 VITALS
TEMPERATURE: 97.8 F | BODY MASS INDEX: 42.3 KG/M2 | DIASTOLIC BLOOD PRESSURE: 84 MMHG | WEIGHT: 263.2 LBS | OXYGEN SATURATION: 95 % | HEIGHT: 66 IN | SYSTOLIC BLOOD PRESSURE: 124 MMHG | HEART RATE: 80 BPM

## 2025-06-10 DIAGNOSIS — R60.0 BILATERAL EDEMA OF LOWER EXTREMITY: ICD-10-CM

## 2025-06-10 DIAGNOSIS — M54.16 LUMBAR RADICULOPATHY: ICD-10-CM

## 2025-06-10 DIAGNOSIS — I10 HYPERTENSION, UNSPECIFIED TYPE: ICD-10-CM

## 2025-06-10 DIAGNOSIS — R20.2 NUMBNESS AND TINGLING OF LEFT LEG: ICD-10-CM

## 2025-06-10 DIAGNOSIS — R63.5 WEIGHT GAIN: ICD-10-CM

## 2025-06-10 DIAGNOSIS — R20.0 NUMBNESS AND TINGLING OF LEFT LEG: ICD-10-CM

## 2025-06-10 DIAGNOSIS — E66.813 CLASS 3 SEVERE OBESITY DUE TO EXCESS CALORIES WITH BODY MASS INDEX (BMI) OF 40.0 TO 44.9 IN ADULT, UNSPECIFIED WHETHER SERIOUS COMORBIDITY PRESENT: ICD-10-CM

## 2025-06-10 PROCEDURE — 3074F SYST BP LT 130 MM HG: CPT | Performed by: FAMILY MEDICINE

## 2025-06-10 PROCEDURE — 1036F TOBACCO NON-USER: CPT | Performed by: FAMILY MEDICINE

## 2025-06-10 PROCEDURE — 3079F DIAST BP 80-89 MM HG: CPT | Performed by: FAMILY MEDICINE

## 2025-06-10 PROCEDURE — 99213 OFFICE O/P EST LOW 20 MIN: CPT | Performed by: FAMILY MEDICINE

## 2025-06-10 PROCEDURE — 3008F BODY MASS INDEX DOCD: CPT | Performed by: FAMILY MEDICINE

## 2025-06-10 PROCEDURE — RXMED WILLOW AMBULATORY MEDICATION CHARGE

## 2025-06-10 RX ORDER — CARVEDILOL 12.5 MG/1
12.5 TABLET ORAL 2 TIMES DAILY
Qty: 180 TABLET | Refills: 1 | Status: SHIPPED | OUTPATIENT
Start: 2025-06-10

## 2025-06-10 RX ORDER — PHENTERMINE HYDROCHLORIDE 37.5 MG/1
37.5 CAPSULE ORAL
Qty: 30 CAPSULE | Refills: 0 | Status: SHIPPED | OUTPATIENT
Start: 2025-06-10 | End: 2025-07-12

## 2025-06-10 RX ORDER — FUROSEMIDE 20 MG/1
TABLET ORAL
Qty: 120 TABLET | Refills: 0 | Status: SHIPPED | OUTPATIENT
Start: 2025-06-10

## 2025-06-10 RX ORDER — CYCLOBENZAPRINE HCL 10 MG
10 TABLET ORAL 3 TIMES DAILY PRN
Qty: 90 TABLET | Refills: 3 | Status: SHIPPED | OUTPATIENT
Start: 2025-06-10

## 2025-06-10 RX ORDER — GABAPENTIN 600 MG/1
600 TABLET ORAL NIGHTLY
Qty: 30 TABLET | Refills: 5 | Status: SHIPPED | OUTPATIENT
Start: 2025-06-10

## 2025-06-10 NOTE — PROGRESS NOTES
Subjective   Patient ID: Lauren Coon is a 59 y.o. female who presents for No chief complaint on file..  HPI    Weight loss management Follow up   Taking Adipex 37.5 mg   Following up for month # 1  Has been eating a generally healthy diet  Exercises 2-3 x per week  What type of exercise walking  Patient last in office weight 264 lbs  Today's in office weight 263 lbs   Patient is - 1 lbs   Has previously taken nothing   Any side effects noted? Dry mouth      Review of Systems  Constitutional:  no chills, no fever and no night sweats.  Eyes: no blurred vision and no eyesight problems.  ENT: no hearing loss, no nasal congestion, no hoarseness and no sore throat.  Neck: no mass (es) and no swelling.  Cardiovascular: no chest pain, no intermittent leg claudication, no lower extremity edema, no palpitation and no syncope.  Respiratory: no cough, no shortness of breath during exertion, no shortness of breath at rest and no wheezing.  Gastrointestinal: no abdominal pain, no blood in stools, no constipation, no diarrhea, no melena, no nausea, no rectal pain and no vomiting.  Genitourinary: no dysuria, no change in urinary frequency, no urinary hesitancy and no feelings of urinary urgency.  Musculoskeletal: no arthralgias, no back pain and no myalgias.  Integumentary: no new skin lesions and no rashes.  Neurological: no difficulty walking, no headache, no limb weakness, no numbness and no tingling.  Psychiatric/Behavioral: no anxiety, no depression, no anhedonia and no substance use disorders.  Endocrine: no recent weight gain and no recent weight loss.  Hematologic/Lymphatic: no tendency for easy bruising and no swollen glands    Objective   Physical Exam  Patient in for follow-up of Adipex for weight loss down around 9 pounds from her scale tolerating well blood pressure is good sleeping okay.  Obese female in no acute distress physical exam today's office visit constitutional alert and oriented x3.    Head is  atraumatic HEENT is within normal limits.    Neck supple no masses full range of motion.    Thyroid is normal in size no thyromegaly there is no carotid bruits.    Pulmonary exam shows clear to auscultation no respiratory distress.    Cardiovascular shows no murmur rub or gallop.  Regular rate and rhythm.    Abdominal exam soft nontender no hepatosplenomegaly or masses normal bowel sounds no rebound no guarding.    Musculoskeletal exam no joint pain no muscle pain full range of motion.    Psych exam normal mood and affect.    Dermatologic exam no skin lesions no rash no blemishes.    Neuro exam is no focal deficits.  Normal exam.    Extremities no edema normal pulses normal capillary refill.  Patient needs medication refill we will send this in routine recheck 3 months to check weight.  There were no vitals taken for this visit.    Lab Results   Component Value Date    WBC 7.7 02/10/2025    HGB 14.0 02/10/2025    HCT 42.2 02/10/2025    MCV 88.8 02/10/2025     02/10/2025       Assessment/Plan   Patient will benefit from weight loss therapy given the following:  Patient started getting up and exercising riding her treadmill doing okay with that denies chest pain or shortness of breath still significant edema and fatigue  [place] reviewed today    Patient attests to BOTH of the following:    Prior to treatment the  patient has trialed lifestyle modification that promotes a reduced calorie diet and exercise of at least 150 minutes per week for at least 6 months and patient has failed to achieve adequate weight loss.    Patient understands to continue with the requested medication with continued lifestyle and behavioral modifications that promotes a reduced calorie diet and exercise of at least 150 minutes per week.          Benefits, risks, possible adverse effects to the medication discussed today            Problem List Items Addressed This Visit    None

## 2025-06-12 ENCOUNTER — PHARMACY VISIT (OUTPATIENT)
Dept: PHARMACY | Facility: CLINIC | Age: 60
End: 2025-06-12
Payer: COMMERCIAL

## 2025-06-18 DIAGNOSIS — E78.2 MIXED HYPERLIPIDEMIA: ICD-10-CM

## 2025-06-18 DIAGNOSIS — M47.20 OSTEOARTHRITIS OF SPINE WITH RADICULOPATHY, UNSPECIFIED SPINAL REGION: ICD-10-CM

## 2025-06-18 DIAGNOSIS — I10 HYPERTENSION, UNSPECIFIED TYPE: ICD-10-CM

## 2025-06-18 PROCEDURE — RXMED WILLOW AMBULATORY MEDICATION CHARGE

## 2025-06-18 RX ORDER — VALSARTAN 160 MG/1
160 TABLET ORAL DAILY
Qty: 90 TABLET | Refills: 3 | Status: SHIPPED | OUTPATIENT
Start: 2025-06-18 | End: 2026-06-18

## 2025-06-18 RX ORDER — HYDROXYCHLOROQUINE SULFATE 200 MG/1
200 TABLET, FILM COATED ORAL 2 TIMES DAILY
Qty: 180 TABLET | Refills: 3 | Status: SHIPPED | OUTPATIENT
Start: 2025-06-18 | End: 2026-06-13

## 2025-06-18 RX ORDER — ROSUVASTATIN CALCIUM 20 MG/1
20 TABLET, COATED ORAL NIGHTLY
Qty: 90 TABLET | Refills: 3 | Status: SHIPPED | OUTPATIENT
Start: 2025-06-18 | End: 2026-06-18

## 2025-06-23 ENCOUNTER — PHARMACY VISIT (OUTPATIENT)
Dept: PHARMACY | Facility: CLINIC | Age: 60
End: 2025-06-23
Payer: COMMERCIAL

## 2025-06-24 ENCOUNTER — APPOINTMENT (OUTPATIENT)
Dept: ALLERGY | Facility: CLINIC | Age: 60
End: 2025-06-24
Payer: COMMERCIAL

## 2025-06-24 VITALS
HEART RATE: 76 BPM | RESPIRATION RATE: 17 BRPM | HEIGHT: 66 IN | BODY MASS INDEX: 42.27 KG/M2 | WEIGHT: 263 LBS | SYSTOLIC BLOOD PRESSURE: 108 MMHG | TEMPERATURE: 97.5 F | OXYGEN SATURATION: 96 % | DIASTOLIC BLOOD PRESSURE: 72 MMHG

## 2025-06-24 DIAGNOSIS — J30.1 SEASONAL ALLERGIC RHINITIS DUE TO POLLEN: ICD-10-CM

## 2025-06-24 DIAGNOSIS — J32.9 CHRONIC SINUSITIS, UNSPECIFIED LOCATION: Primary | ICD-10-CM

## 2025-06-24 DIAGNOSIS — T78.1XXD POLLEN-FOOD ALLERGY, SUBSEQUENT ENCOUNTER: ICD-10-CM

## 2025-06-24 DIAGNOSIS — E66.9 OBESITY (BMI 35.0-39.9 WITHOUT COMORBIDITY): ICD-10-CM

## 2025-06-24 DIAGNOSIS — E78.2 MIXED HYPERLIPIDEMIA: ICD-10-CM

## 2025-06-24 PROCEDURE — 99214 OFFICE O/P EST MOD 30 MIN: CPT | Performed by: ALLERGY & IMMUNOLOGY

## 2025-06-24 PROCEDURE — 90471 IMMUNIZATION ADMIN: CPT | Performed by: ALLERGY & IMMUNOLOGY

## 2025-06-24 PROCEDURE — 3008F BODY MASS INDEX DOCD: CPT | Performed by: ALLERGY & IMMUNOLOGY

## 2025-06-24 PROCEDURE — 90732 PPSV23 VACC 2 YRS+ SUBQ/IM: CPT | Performed by: ALLERGY & IMMUNOLOGY

## 2025-06-24 PROCEDURE — 3078F DIAST BP <80 MM HG: CPT | Performed by: ALLERGY & IMMUNOLOGY

## 2025-06-24 PROCEDURE — 3074F SYST BP LT 130 MM HG: CPT | Performed by: ALLERGY & IMMUNOLOGY

## 2025-06-24 NOTE — PROGRESS NOTES
Patient ID: Lauren Coon is a 59 y.o. female.     Chief Complaint: follow up and lab review  History Of Present Illness  Lauren Coon is a 59 y.o. female with PMx rhinitis, chronic sinus presenting for follow up.     Food Allergy  Cantaloupe and Kiwi lead to thickness of the tongue and swollen lips    Eczema/ Atopic Dermatitis  No    Asthma  No    Rhinoconjunctivitis  History of pollen allergy-as needed medications  Reports need for quarterly Kenalog injections to control symptoms, but has early cataracts and is warned by her eye doctor that this may be detrimental to her vision  Avoids kiwi and cantaloupe due to oral allergy syndrome    Drug Allergy   Several reviewed    Insect Allergy   No    Infections  No history of frequent or recurrent infections      Review of Systems    Pertinent positives and negatives have been assessed in the HPI. All other systems have been reviewed and are negative except as noted in the HPI.    Allergies  Morphine, Kiwi (actinidia chinensis), Lactose, Methylprednisolone, Rofecoxib, and Valdecoxib    Past Medical History  She has a past medical history of Acute pansinusitis, unspecified (08/17/2021), Acute upper respiratory infection, unspecified (09/06/2022), Allergic, Arthritis, Cataract, Contact with and (suspected) exposure to covid-19 (03/08/2022), Encounter for immunization (12/18/2020), Encounter for other screening for malignant neoplasm of breast (04/26/2022), Encounter for screening for malignant neoplasm of colon (04/20/2021), Encounter for screening for osteoporosis, Hypertension, Laceration without foreign body, right lower leg, subsequent encounter (05/04/2021), Nausea, Other specified symptoms and signs involving the circulatory and respiratory systems (06/02/2021), Personal history of diseases of the skin and subcutaneous tissue (04/20/2021), Personal history of Methicillin resistant Staphylococcus aureus infection (09/27/2021), Personal history of other  diseases of the respiratory system (04/28/2022), Personal history of other diseases of the respiratory system (09/22/2020), Personal history of other infectious and parasitic diseases (10/09/2020), Personal history of other specified conditions (07/19/2022), Personal history of other specified conditions (03/17/2022), and Unspecified open wound, right lower leg, initial encounter (05/04/2021).    Family History  Family History   Problem Relation Name Age of Onset    Hypertension Mother Leslie     Hyperthyroidism Mother Leslie     Asthma Mother Leslie     Arthritis Father Alvin     Diabetes Father Alvin     Prostate cancer Father Alvin        No history of food allergy or atopic disease in the family.    Surgical History  She has a past surgical history that includes Other surgical history (11/04/2019); Other surgical history (11/04/2019); Other surgical history (11/04/2019); Other surgical history (11/04/2019); Other surgical history (11/04/2019); Other surgical history (11/04/2019); Other surgical history (11/04/2019); and Other surgical history (11/04/2019).    Social/Environmental History  She reports that she has never smoked. She has never used smokeless tobacco. She reports that she does not drink alcohol and does not use drugs.    Home: Lives in a trailer   Floors: Tiled  Air Conditioning: Central  Smoker: No  Pets: No   Infestations: No  Molds: No  Occupation:     MEDICATIONS  Current Outpatient Medications on File Prior to Visit   Medication Sig Dispense Refill    acetaminophen (Tylenol) 500 mg tablet Take 2 tablets (1,000 mg) by mouth 2 times a day.      carvedilol (Coreg) 12.5 mg tablet Take 1 tablet (12.5 mg) by mouth 2 times a day. 180 tablet 1    colestipol (Colestid) 1 gram tablet Take 1 tablet (1 g) by mouth 3 times a day. Take at least 1 hour after or 4 hours before other medications. 90 tablet 3    cyclobenzaprine (Flexeril) 10 mg tablet Take 1 tablet (10 mg) by mouth 3 times a  "day as needed for muscle spasms. 90 tablet 3    furosemide (Lasix) 20 mg tablet Take 2 tablets (40mg) by mouth Tues/Thurs/Sat. Take 1 tablet (20mg) by mouth Mon/Wed/Fri/Sun. 120 tablet 0    gabapentin (Neurontin) 600 mg tablet Take 1 tablet (600 mg) by mouth once daily at bedtime. 30 tablet 5    hydroxychloroquine (PlaqueniL) 200 mg tablet Take 1 tablet (200 mg) by mouth 2 times a day. 180 tablet 3    phentermine 37.5 mg capsule Take 1 capsule (37.5 mg) by mouth once daily in the morning. Take before meals. 30 capsule 0    rosuvastatin (Crestor) 20 mg tablet Take 1 tablet (20 mg) by mouth once daily at bedtime. 90 tablet 3    valsartan (Diovan) 160 mg tablet Take 1 tablet (160 mg) by mouth once daily. 90 tablet 3    [DISCONTINUED] hydroxychloroquine (PlaqueniL) 200 mg tablet Take 1 tablet (200 mg) by mouth 2 times a day. 60 tablet 3    [DISCONTINUED] rosuvastatin (Crestor) 20 mg tablet Take 1 tablet (20 mg) by mouth once daily at bedtime. 90 tablet 3    [DISCONTINUED] valsartan (Diovan) 160 mg tablet Take 1 tablet (160 mg) by mouth once daily. 90 tablet 3     No current facility-administered medications on file prior to visit.     Physical Exam  Visit Vitals  /72   Pulse 76   Temp 36.4 °C (97.5 °F) (Temporal)   Resp 17   Ht 1.676 m (5' 6\")   Wt 119 kg (263 lb)   SpO2 96%   BMI 42.45 kg/m²   Smoking Status Never   BSA 2.35 m²       Wt Readings from Last 1 Encounters:   06/24/25 119 kg (263 lb)       Physical Exam    General: Well appearing, no acute distress  Head: Normocephalic, atraumatic, neck supple without lymphadenopathy  Eyes: EOMI, non-injected  Nose: No nasal crease, nares patent, slightly boggy turbinates, minimal discharge  Throat: Normal dentition, no erythema  Heart: Regular rate and rhythm  Lungs: Clear to auscultation bilaterally, effort normal  Extremities: Moves all extremities symmetrically, no edema  Skin: No rashes/lesions  Psych: normal mood and affect    LAB RESULTS:  CBC:  Recent Labs     " 02/10/25  0918 02/01/24  0717 03/12/22  1318 09/07/21  1354 06/22/20  1119   WBC 7.7 10.4 13.1*   < > 7.3   HGB 14.0 14.1 14.1   < > 14.2   HCT 42.2 44.8 44.4   < > 45.1    237 266   < > 231   MCV 88.8 92 90   < > 90   EOSABS  --  0.31 0.10  --  0.16    < > = values in this interval not displayed.       CMP:  Recent Labs     02/10/25  0918 12/03/24  1429 02/01/24  0717    142 142   K 4.2 3.8 4.6    103 103   CO2 24 32 30   ANIONGAP 12 11 14   BUN 20 17 18   CREATININE 0.61 0.75 0.61   EGFR 104 >90 >90     Recent Labs     12/03/24  1429 02/01/24  0717 09/07/21  1354 02/25/20  1442 12/24/19  0925   ALBUMIN 3.9 3.8 3.7   < > 4.0   ALKPHOS 82 84 71   < > 88   ALT 23 29 20   < > 20   AST 17 22 17   < > 16   BILITOT 0.4 0.4 0.3   < > 0.6   LIPASE  --   --   --   --  6*    < > = values in this interval not displayed.       Recent Labs     02/01/24  0717 03/12/22  1318 06/22/20  1119   EOSABS 0.31 0.10 0.16     COAG:   Recent Labs     09/07/21  1354   INR 1.0       HEME/ENDO:  Recent Labs     01/14/25  1418 12/03/24  1429 02/01/24  0717 02/25/20  1442   TSH  --   --   --  1.13   HGBA1C  --  5.9* 6.2*  --    FERRITIN 210*  --   --   --    IRONSAT 14*  --   --   --      Allergy skin tests 4/15/13-gyl-nxefvyon and scanned.  Pneumovax 6/24/25  Strep Pneumo IgG Ab 23 Serotypes  Order: [id number]   Status: Final result       Dx: Recurrent sinusitis    Test Result Released: Yes (seen)    0 Result Notes      Component 2 mo ago   SEROTYPE 1 (1) <0.3   SEROTYPE 2 (2) 0.7   SEROTYPE 3 (3) <0.3   SEROTYPE 4 (4) <0.3   SEROTYPE 5 (5) 1.0   SEROTYPE 8 (8) 0.3   SEROTYPE 9 (9N) <0.3   SEROTYPE 12 (12F) <0.3   SEROTYPE 14 (14) 0.4   SEROTYPE 17 (17F) 0.5   SEROTYPE 19 (19F) 2.1   SEROTYPE 20 (20) 1.6   SEROTYPE 22 (22F) 0.9   SEROTYPE 23 (23F) <0.3   SEROTYPE 26 (6B) <0.3   SEROTYPE 34 (10A) <0.3   SEROTYPE 43 (11A) <0.3   SEROTYPE 51 (7F) <0.3   SEROTYPE 54 (15B) 1.8   SEROTYPE 56 (18C) 1.2   SEROTYPE 57 (19A) 0.5    SEROTYPE 68 (9V) <0.3   SEROTYPE 70 (33F) 0.9        Assessment/Plan   58 yo with a history of allergic rhinitis and chronic sinusitis.  Allergy skin tests are not definitively positive. There are some mild positives to grass and ragweed pollen in the blood so we discussed seasonal medications and immune labs are showing low titers for S. Pneumonia and slightly elevated IGA, likely inflammatory in nature.  -Pneumovax with additional labs in a month and I will call results  -Follow up in 4 months for recheck  -Avoid bothersome foods (kiwi and cantaloupe) as this may be oral allergy syndrome associated with ragweed pollen.    Patricia Leroy,

## 2025-06-27 LAB
25(OH)D3+25(OH)D2 SERPL-MCNC: 45 NG/ML (ref 30–100)
ANION GAP SERPL CALCULATED.4IONS-SCNC: 10 MMOL/L (CALC) (ref 7–17)
BASOPHILS # BLD AUTO: 44 CELLS/UL (ref 0–200)
BASOPHILS NFR BLD AUTO: 0.6 %
BUN SERPL-MCNC: 18 MG/DL (ref 7–25)
BUN/CREAT SERPL: ABNORMAL (CALC) (ref 6–22)
CALCIUM SERPL-MCNC: 9.5 MG/DL (ref 8.6–10.4)
CHLORIDE SERPL-SCNC: 102 MMOL/L (ref 98–110)
CHOLEST SERPL-MCNC: 81 MG/DL
CHOLEST/HDLC SERPL: 2.6 (CALC)
CO2 SERPL-SCNC: 28 MMOL/L (ref 20–32)
CREAT SERPL-MCNC: 0.72 MG/DL (ref 0.5–1.03)
EGFRCR SERPLBLD CKD-EPI 2021: 97 ML/MIN/1.73M2
EOSINOPHIL # BLD AUTO: 329 CELLS/UL (ref 15–500)
EOSINOPHIL NFR BLD AUTO: 4.5 %
ERYTHROCYTE [DISTWIDTH] IN BLOOD BY AUTOMATED COUNT: 12.8 % (ref 11–15)
GLUCOSE SERPL-MCNC: 105 MG/DL (ref 65–99)
HCT VFR BLD AUTO: 42.3 % (ref 35–45)
HDLC SERPL-MCNC: 31 MG/DL
HGB BLD-MCNC: 13.9 G/DL (ref 11.7–15.5)
LDLC SERPL CALC-MCNC: 30 MG/DL (CALC)
LYMPHOCYTES # BLD AUTO: 1256 CELLS/UL (ref 850–3900)
LYMPHOCYTES NFR BLD AUTO: 17.2 %
MCH RBC QN AUTO: 30 PG (ref 27–33)
MCHC RBC AUTO-ENTMCNC: 32.9 G/DL (ref 32–36)
MCV RBC AUTO: 91.4 FL (ref 80–100)
MONOCYTES # BLD AUTO: 460 CELLS/UL (ref 200–950)
MONOCYTES NFR BLD AUTO: 6.3 %
NEUTROPHILS # BLD AUTO: 5212 CELLS/UL (ref 1500–7800)
NEUTROPHILS NFR BLD AUTO: 71.4 %
NONHDLC SERPL-MCNC: 50 MG/DL (CALC)
PLATELET # BLD AUTO: 220 THOUSAND/UL (ref 140–400)
PMV BLD REES-ECKER: 10.5 FL (ref 7.5–12.5)
POTASSIUM SERPL-SCNC: 4.4 MMOL/L (ref 3.5–5.3)
RBC # BLD AUTO: 4.63 MILLION/UL (ref 3.8–5.1)
SODIUM SERPL-SCNC: 140 MMOL/L (ref 135–146)
TRIGL SERPL-MCNC: 113 MG/DL
WBC # BLD AUTO: 7.3 THOUSAND/UL (ref 3.8–10.8)

## 2025-06-30 ENCOUNTER — TELEPHONE (OUTPATIENT)
Dept: PRIMARY CARE | Facility: CLINIC | Age: 60
End: 2025-06-30
Payer: COMMERCIAL

## 2025-06-30 NOTE — TELEPHONE ENCOUNTER
----- Message from Maged Day sent at 6/30/2025  7:49 AM EDT -----  Labs are normal  ----- Message -----  From: Anival, Luis Results In  Sent: 6/27/2025   9:54 PM EDT  To: Maged Day MD

## 2025-06-30 NOTE — TELEPHONE ENCOUNTER
----- Message from Maged Day sent at 6/30/2025  8:00 AM EDT -----  Vitamin D level is normal  ----- Message -----  From: Anival Xochitl (So-Shee) [employer] Results In  Sent: 6/27/2025  10:10 PM EDT  To: Maged Day MD

## 2025-07-06 DIAGNOSIS — R63.5 WEIGHT GAIN: ICD-10-CM

## 2025-07-07 PROCEDURE — RXMED WILLOW AMBULATORY MEDICATION CHARGE

## 2025-07-09 PROCEDURE — RXMED WILLOW AMBULATORY MEDICATION CHARGE

## 2025-07-09 RX ORDER — PHENTERMINE HYDROCHLORIDE 37.5 MG/1
37.5 CAPSULE ORAL
Qty: 30 CAPSULE | Refills: 0 | Status: SHIPPED | OUTPATIENT
Start: 2025-07-09 | End: 2025-08-10

## 2025-07-11 ENCOUNTER — PHARMACY VISIT (OUTPATIENT)
Dept: PHARMACY | Facility: CLINIC | Age: 60
End: 2025-07-11
Payer: COMMERCIAL

## 2025-07-15 ENCOUNTER — APPOINTMENT (OUTPATIENT)
Dept: PRIMARY CARE | Facility: CLINIC | Age: 60
End: 2025-07-15
Payer: COMMERCIAL

## 2025-07-16 ENCOUNTER — TELEPHONE (OUTPATIENT)
Dept: PRIMARY CARE | Facility: CLINIC | Age: 60
End: 2025-07-16
Payer: COMMERCIAL

## 2025-07-16 DIAGNOSIS — M54.50 LUMBAR PAIN: ICD-10-CM

## 2025-07-16 DIAGNOSIS — R20.2 TINGLING OF BOTH UPPER EXTREMITIES: ICD-10-CM

## 2025-07-16 DIAGNOSIS — R20.0 LEFT ARM NUMBNESS: Primary | ICD-10-CM

## 2025-07-16 DIAGNOSIS — R20.0 RIGHT ARM NUMBNESS: ICD-10-CM

## 2025-07-16 PROCEDURE — RXMED WILLOW AMBULATORY MEDICATION CHARGE

## 2025-07-16 RX ORDER — OXYCODONE AND ACETAMINOPHEN 5; 325 MG/1; MG/1
1 TABLET ORAL EVERY 6 HOURS PRN
Qty: 28 TABLET | Refills: 0 | Status: SHIPPED | OUTPATIENT
Start: 2025-07-16 | End: 2025-07-25

## 2025-07-16 NOTE — TELEPHONE ENCOUNTER
Bilateral EMG ordered and referral to Dr Marti ordered. Please sign off   Per Dr Day double up on Gabapentin 600 mg at night     Please call in Percocet 5-325 mg to [facility] pharmacy       Health Maintenance Due   Topic Date Due   â¢ COVID-19 Vaccine (1) Never done   â¢ Hepatitis C Screening  Never done   â¢ Shingles Vaccine (1 of 2) Never done   â¢ DTaP/Tdap/Td Vaccine (2 - Td or Tdap) 10/12/2020       Patient is due for the topics as listed above, will discuss with provider.      Covid- declines   Shingles- not covered by Medicare   Recent PHQ 2/9 Score    PHQ 2:  Date Adult PHQ 2 Score Adult PHQ 2 Interpretation   3/22/2023 0 No further screening needed       PHQ 9:

## 2025-07-16 NOTE — TELEPHONE ENCOUNTER
Patient states her left arm, hand, leg is going numb and tingling. She's been up since about 4 this morning. She's having pain along with this numbness and tingling. Her back is painful and feels inflamed. She thinks maybe it's pressing on a nerve in her back.  She states the Plaquenil that you put her on isn't doing anything for her.  She wanted to know if she could increase her Gabapentin. She's currently taking 600 mg, can she go up to 900 mg possibly?   She definitely needs something for the inflammation in her back. She's not sure what you would like to do.    Please advise    Thanks    [facility]

## 2025-07-18 ENCOUNTER — PHARMACY VISIT (OUTPATIENT)
Dept: PHARMACY | Facility: CLINIC | Age: 60
End: 2025-07-18
Payer: COMMERCIAL

## 2025-07-24 DIAGNOSIS — J32.9 CHRONIC SINUSITIS, UNSPECIFIED LOCATION: ICD-10-CM

## 2025-07-29 DIAGNOSIS — R20.2 NUMBNESS AND TINGLING OF LEFT LEG: ICD-10-CM

## 2025-07-29 DIAGNOSIS — R20.0 NUMBNESS AND TINGLING OF LEFT LEG: ICD-10-CM

## 2025-07-29 RX ORDER — GABAPENTIN 600 MG/1
600 TABLET ORAL 2 TIMES DAILY
Qty: 60 TABLET | Refills: 5 | Status: SHIPPED | OUTPATIENT
Start: 2025-07-29

## 2025-07-31 PROCEDURE — RXMED WILLOW AMBULATORY MEDICATION CHARGE

## 2025-08-01 ENCOUNTER — PHARMACY VISIT (OUTPATIENT)
Dept: PHARMACY | Facility: CLINIC | Age: 60
End: 2025-08-01
Payer: COMMERCIAL

## 2025-08-12 DIAGNOSIS — R63.5 WEIGHT GAIN: ICD-10-CM

## 2025-08-12 PROCEDURE — RXMED WILLOW AMBULATORY MEDICATION CHARGE

## 2025-08-13 PROCEDURE — RXMED WILLOW AMBULATORY MEDICATION CHARGE

## 2025-08-13 RX ORDER — PHENTERMINE HYDROCHLORIDE 37.5 MG/1
37.5 CAPSULE ORAL
Qty: 30 CAPSULE | Refills: 0 | Status: SHIPPED | OUTPATIENT
Start: 2025-08-13 | End: 2025-09-13

## 2025-08-18 ENCOUNTER — PHARMACY VISIT (OUTPATIENT)
Dept: PHARMACY | Facility: CLINIC | Age: 60
End: 2025-08-18
Payer: COMMERCIAL

## 2025-08-18 ENCOUNTER — TELEPHONE (OUTPATIENT)
Dept: PRIMARY CARE | Facility: CLINIC | Age: 60
End: 2025-08-18
Payer: COMMERCIAL

## 2025-08-18 DIAGNOSIS — H65.01 NON-RECURRENT ACUTE SEROUS OTITIS MEDIA OF RIGHT EAR: Primary | ICD-10-CM

## 2025-08-18 RX ORDER — AZITHROMYCIN 250 MG/1
TABLET, FILM COATED ORAL
Qty: 6 TABLET | Refills: 0 | Status: SHIPPED | OUTPATIENT
Start: 2025-08-18 | End: 2025-08-23

## 2025-09-03 ENCOUNTER — PHARMACY VISIT (OUTPATIENT)
Dept: PHARMACY | Facility: CLINIC | Age: 60
End: 2025-09-03
Payer: COMMERCIAL

## 2025-09-03 PROCEDURE — RXMED WILLOW AMBULATORY MEDICATION CHARGE

## 2025-09-22 ENCOUNTER — APPOINTMENT (OUTPATIENT)
Dept: CARDIOLOGY | Facility: CLINIC | Age: 60
End: 2025-09-22
Payer: COMMERCIAL

## 2025-10-21 ENCOUNTER — APPOINTMENT (OUTPATIENT)
Dept: ALLERGY | Facility: CLINIC | Age: 60
End: 2025-10-21
Payer: COMMERCIAL

## (undated) DEVICE — NEEDLE SPNL L3.5IN PNK HUB S STL REG WALL FIT STYL W/ QNCKE

## (undated) DEVICE — WAX SURG 2.5GM HEMSTAT BNE BEESWAX PARAFFIN ISO PALMITATE

## (undated) DEVICE — SUTURE VCRL SZ 1 L36IN ABSRB UD L36MM CT-1 1/2 CIR J947H

## (undated) DEVICE — X-RAY DETECTABLE SPONGES,16 PLY: Brand: VISTEC

## (undated) DEVICE — Z DISCONTINUED PER MEDLINE USE 2741944 DRESSING AQUACEL 12 IN SURG W9XL30CM SIL CVR WTRPRF VIR BACT BARR ANTIMIC

## (undated) DEVICE — 3 BONE CEMENT MIXER WITH SPATULA: Brand: MIXEVAC, ACM

## (undated) DEVICE — CORD BPLR 2 PIN FLAT AND RND DISP

## (undated) DEVICE — PACK PROCEDURE SURG TOTAL KNEE PACK

## (undated) DEVICE — GLOVE ORANGE PI 8 1/2   MSG9085

## (undated) DEVICE — SPLINT KNEE UNIV FOR LESS THAN 36IN L20IN FOAM LAM E CNTCT

## (undated) DEVICE — TOTAL TRAY, DB, 100% SILI FOLEY, 16FR 10: Brand: MEDLINE

## (undated) DEVICE — KAIRISON TUBING SET PNEUMATIC, (3000 MM), STERILE, DISPOSABLE, TO BE USED WITH: FK898R, PACKAGE OF 10 PIECES: Brand: KAIRISON

## (undated) DEVICE — 1010 S-DRAPE TOWEL DRAPE 10/BX: Brand: STERI-DRAPE™

## (undated) DEVICE — INTENDED FOR TISSUE SEPARATION, AND OTHER PROCEDURES THAT REQUIRE A SHARP SURGICAL BLADE TO PUNCTURE OR CUT.: Brand: BARD-PARKER ® CARBON RIB-BACK BLADES

## (undated) DEVICE — PACK,LAPAROTOMY,NO GOWNS: Brand: MEDLINE

## (undated) DEVICE — CHLORAPREP 26ML ORANGE

## (undated) DEVICE — Z CONVERTED USE 2271043 CONTAINER SPEC COLL 4OZ SCR ON LID PEEL PCH

## (undated) DEVICE — FLOSEAL MATRIX IS INDICATED IN SURGICAL PROCEDURES (OTHER THAN IN OPHTHALMIC) AS AN ADJUNCT TO HEMOSTASIS WHEN CONTROL OF BLEEDING BY LIGATURE OR CONVENTIONAL PROCEDURES IS INEFFECTIVE OR IMPRACTICAL.: Brand: FLOSEAL HEMOSTATIC MATRIX

## (undated) DEVICE — GOWN,AURORA,NONREINFORCED,LARGE: Brand: MEDLINE

## (undated) DEVICE — BLADE REPROC SAW PATELLA REAMER 32MM

## (undated) DEVICE — SYRINGE MED 30ML STD CLR PLAS LUERLOCK TIP N CTRL DISP

## (undated) DEVICE — DRESSING GZ W1XL8IN COT XRFRM N ADH OVERWRAP CURAD

## (undated) DEVICE — SUTURE VCRL + SZ 2-0 L36IN ABSRB UD L36MM CT-1 1/2 CIR VCP945H

## (undated) DEVICE — 3M™ IOBAN™ 2 ANTIMICROBIAL INCISE DRAPE 6650EZ: Brand: IOBAN™ 2

## (undated) DEVICE — COUNTER NDL 40 COUNT HLD 70 FOAM BLK ADH W/ MAG

## (undated) DEVICE — SPONGE,LAP,18"X18",DLX,XR,ST,5/PK,40/PK: Brand: MEDLINE

## (undated) DEVICE — LABEL MED MINI W/ MARKER

## (undated) DEVICE — SUTURE VCRL SZ 0 L36IN ABSRB UD L36MM CT-1 1/2 CIR J946H

## (undated) DEVICE — KIT JACK TBL PT CARE

## (undated) DEVICE — Device: Brand: STABLECUT®

## (undated) DEVICE — GOWN,AURORA,NONRNF,XL,30/CS: Brand: MEDLINE

## (undated) DEVICE — SUTURE MCRYL + SZ 3-0 L36IN ABSRB UD CT-1 L36MM 1/2 CIR MCP944H

## (undated) DEVICE — GAUZE SPONGES,12 PLY: Brand: CURITY

## (undated) DEVICE — Device

## (undated) DEVICE — GLOVE ORANGE PI 7 1/2   MSG9075

## (undated) DEVICE — PRECISION MATCH HEAD

## (undated) DEVICE — PENCIL ES L3M BTTN SWCH HOLSTER W/ BLDE ELECTRD EDGE

## (undated) DEVICE — CODMAN® SURGICAL PATTIES 1/2" X 1/2" (1.27CM X 1.27CM): Brand: CODMAN®

## (undated) DEVICE — CATHETER IV 16GA 205ML/MIN L1.77IN OD1.74MM ID1.359MM GRY

## (undated) DEVICE — GLOVE ORANGE PI 8   MSG9080

## (undated) DEVICE — SPONGE GZ W4XL4IN COT 12 PLY TYP VII WVN C FLD DSGN

## (undated) DEVICE — BLADE SAW W125XL70MM THK064MM CUT THK094MM REPL RECIP DBL

## (undated) DEVICE — KIT ARMOR C DRP COLLAPSIBLE AND SELF EXP TOP CVR FOR FLUOROSCOPIC

## (undated) DEVICE — T4 HOOD

## (undated) DEVICE — ZIMMER® STERILE DISPOSABLE TOURNIQUET CUFF, DUAL PORT, SINGLE BLADDER, 34 IN. (86 CM)

## (undated) DEVICE — SIPS DUAL 2 MINUTE TIP

## (undated) DEVICE — ALCOHOL RUBBING ISO 16OZ 70%

## (undated) DEVICE — 5.0MM ROUND FLUTED SOFT TOUCH

## (undated) DEVICE — MAT FLR SURG QUICKWICK 28X54 IN DISP

## (undated) DEVICE — SECTO® DISSECTOR, KITTNER, 5/16 IN DIAMETER, (5 EA/POUCH, 24 POUCHES/PK, 4 PK/BX): Brand: SYMMETRY SURGICAL

## (undated) DEVICE — SHEET,DRAPE,53X77,STERILE: Brand: MEDLINE

## (undated) DEVICE — PATIENT RETURN ELECTRODE, SINGLE-USE, CONTACT QUALITY MONITORING, ADULT, WITH 9FT CORD, FOR PATIENTS WEIGING OVER 33LBS. (15KG): Brand: MEGADYNE

## (undated) DEVICE — BANDAGE COMPR M W6INXL10YD WHT BGE VELC E MTRX HK AND LOOP

## (undated) DEVICE — TUBING, SUCTION, 1/4" X 10', STRAIGHT: Brand: MEDLINE

## (undated) DEVICE — TIBURON SPLIT SHEET: Brand: CONVERTORS

## (undated) DEVICE — 3M™ STERI-DRAPE™ U-DRAPE 1015: Brand: STERI-DRAPE™

## (undated) DEVICE — SUTURE MCRYL SZ 3-0 L27IN ABSRB UD L19MM PS-2 3/8 CIR PRIM Y427H

## (undated) DEVICE — STERILE PATIENT PROTECTIVE PAD FOR IMP® KNEE POSITIONERS & COHESIVE WRAP (10 / CASE): Brand: DE MAYO KNEE POSITIONER®

## (undated) DEVICE — SUTURE VCRL SZ 2-0 L36IN ABSRB UD L36MM CT-1 1/2 CIR J945H

## (undated) DEVICE — STRIP,CLOSURE,WOUND,MEDI-STRIP,1/2X4: Brand: MEDLINE

## (undated) DEVICE — BIPOLAR SEALER 23-301-1 AQM MBS: Brand: AQUAMANTYS™

## (undated) DEVICE — PROBE PEDCL L165MM CANN W/ MOD JAMSH NDL NO2 MOD MOD

## (undated) DEVICE — 3M™ STERI-STRIP™ REINFORCED ADHESIVE SKIN CLOSURES, R1547, 1/2 IN X 4 IN (12 MM X 100 MM), 6 STRIPS/ENVELOPE: Brand: 3M™ STERI-STRIP™

## (undated) DEVICE — HANDLE PRB CANN DETACH FOR GRP MOD MOD PEDIGUARD

## (undated) DEVICE — UNDERCAST PADDING: Brand: DEROYAL

## (undated) DEVICE — SUTURE VCRL SZ 1 L36IN ABSRB VLT L48MM CTX 1/2 CIR J371H

## (undated) DEVICE — ELECTRODE PT RET AD L9FT HI MOIST COND ADH HYDRGEL CORDED

## (undated) DEVICE — TIBURON TOP SHEET: Brand: CONVERTORS

## (undated) DEVICE — 3M™ STERI-DRAPE™ INSTRUMENT POUCH 1018: Brand: STERI-DRAPE™

## (undated) DEVICE — BLADE ES L6IN ELASTOMERIC COAT EXT DURABLE BEND UPTO 90DEG

## (undated) DEVICE — 3M™ TEGADERM™ TRANSPARENT FILM DRESSING FRAME STYLE, 1626W, 4 IN X 4-3/4 IN (10 CM X 12 CM), 50/CT 4CT/CASE: Brand: 3M™ TEGADERM™

## (undated) DEVICE — SYRINGE IRRIG 60ML SFT PLIABLE BLB EZ TO GRP 1 HND USE W/

## (undated) DEVICE — RESERVOIR,SUCTION,100CC,SILICONE: Brand: MEDLINE

## (undated) DEVICE — MARKER SURG SKIN GENTIAN VLT REG TIP W/ 6IN RUL

## (undated) DEVICE — DRAIN JACKSON PRATT 10FR 7MM: Brand: CARDINAL HEALTH

## (undated) DEVICE — CODMAN® SURGICAL PATTIES 3/4" X 3/4" (1.91CM X 1.91CM): Brand: CODMAN®